# Patient Record
Sex: FEMALE | HISPANIC OR LATINO | Employment: UNEMPLOYED | URBAN - NONMETROPOLITAN AREA
[De-identification: names, ages, dates, MRNs, and addresses within clinical notes are randomized per-mention and may not be internally consistent; named-entity substitution may affect disease eponyms.]

---

## 2017-05-18 ENCOUNTER — OFFICE VISIT (OUTPATIENT)
Dept: OTOLARYNGOLOGY | Facility: CLINIC | Age: 32
End: 2017-05-18

## 2017-05-18 VITALS
HEIGHT: 63 IN | DIASTOLIC BLOOD PRESSURE: 84 MMHG | HEART RATE: 67 BPM | BODY MASS INDEX: 21.62 KG/M2 | WEIGHT: 122 LBS | TEMPERATURE: 98.8 F | SYSTOLIC BLOOD PRESSURE: 117 MMHG

## 2017-05-18 DIAGNOSIS — D48.9 NEOPLASM OF UNCERTAIN BEHAVIOR: Primary | ICD-10-CM

## 2017-05-18 PROCEDURE — 99203 OFFICE O/P NEW LOW 30 MIN: CPT | Performed by: OTOLARYNGOLOGY

## 2017-05-18 RX ORDER — AMOXICILLIN 500 MG/1
500 CAPSULE ORAL 3 TIMES DAILY
Qty: 20 CAPSULE | Refills: 0 | Status: SHIPPED | OUTPATIENT
Start: 2017-05-18 | End: 2017-05-28

## 2017-05-23 ENCOUNTER — HOSPITAL ENCOUNTER (OUTPATIENT)
Dept: CT IMAGING | Facility: HOSPITAL | Age: 32
Discharge: HOME | End: 2017-05-23
Attending: OTOLARYNGOLOGY | Admitting: OTOLARYNGOLOGY

## 2017-05-23 DIAGNOSIS — D48.9 NEOPLASM OF UNCERTAIN BEHAVIOR: ICD-10-CM

## 2017-05-23 PROCEDURE — 70491 CT SOFT TISSUE NECK W/DYE: CPT

## 2017-05-23 PROCEDURE — 0 IOPAMIDOL 61 % SOLUTION: Performed by: OTOLARYNGOLOGY

## 2017-05-23 PROCEDURE — 82565 ASSAY OF CREATININE: CPT

## 2017-05-23 RX ADMIN — IOPAMIDOL 100 ML: 612 INJECTION, SOLUTION INTRAVENOUS at 11:45

## 2017-05-24 LAB — CREAT BLDA-MCNC: 0.8 MG/DL (ref 0.6–1.3)

## 2017-06-08 ENCOUNTER — OFFICE VISIT (OUTPATIENT)
Dept: OTOLARYNGOLOGY | Facility: CLINIC | Age: 32
End: 2017-06-08

## 2017-06-08 VITALS
WEIGHT: 121 LBS | DIASTOLIC BLOOD PRESSURE: 68 MMHG | HEART RATE: 80 BPM | HEIGHT: 63 IN | SYSTOLIC BLOOD PRESSURE: 110 MMHG | TEMPERATURE: 99.1 F | BODY MASS INDEX: 21.44 KG/M2

## 2017-06-08 DIAGNOSIS — D37.01 NEOPLASM OF UNCERTAIN BEHAVIOR OF LIP: Primary | ICD-10-CM

## 2017-06-08 DIAGNOSIS — K13.79 LESION OF HARD PALATE: ICD-10-CM

## 2017-06-08 PROCEDURE — 99213 OFFICE O/P EST LOW 20 MIN: CPT | Performed by: PHYSICIAN ASSISTANT

## 2017-06-08 RX ORDER — AMOXICILLIN 500 MG/1
1000 CAPSULE ORAL 2 TIMES DAILY
COMMUNITY
End: 2017-07-27

## 2017-06-08 NOTE — PROGRESS NOTES
YOB: 1985  Location: [facility]  Location Address: [address] [place] [address],  [address], [address] [place] [address]  Location Phone: [phone]    Chief Complaint   Patient presents with   • Follow-up     lip       History of Present Illness  The patient is a 31 y.o. female who presents for follow up for an upper lip tumor. She had this removed by Dr. Chris Lazo at the [facility] in  at [facility].  Dr. Sheridan has retired. She had been having issues recently with her lip turning blue and black. She states her lip will also bleed, swell and become painful and tender. Patient states that her lip is better today. Patient does however have a black lesion of the upper palate.  .       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      DOSE: 178 mGycm. Automated exposure control was utilized to diminish  patient radiation dose.      FINDINGS: Multiple contiguous axial images were obtained through the  soft tissues of the neck at 3 mm intervals following intravenous  contrast administration with reformatted images obtained in the sagittal  and coronal projections from the original data set. Intraparenchymal  calcifications are noted within the left frontal lobe measuring 8 mm and  within the posterior left temporal lobe measuring 7 mm in size. There is  no associated mass effect. These are of uncertain etiology but are not  felt to be neoplastic in nature. Those portions of the brain which are  visualized are otherwise normal in appearance.      Both orbits are normal in appearance including the optic nerves and  extraocular muscles. Both globes are intact. Visualized paranasal  sinuses are clear with no mucoperiosteal thickening or air-fluid levels.      The nasopharynx and parapharyngeal spaces are symmetric with no evidence  of mass or mass effect. The parotid and  submandibular glands are normal  in appearance.      No enlarged cervical chain lymphadenopathy is present. There are no  pathologically enlarged submandibular or submental nodes present. A  discrete mass is not currently identified. There are no acute bony  abnormalities present. The lung apices are clear. The thyroid gland is  homogeneous in density with no discrete nodules or cystic change.      IMPRESSION:  Essentially unremarkable CT of the soft tissues of the neck. No discrete  mass is clearly identified and there is no evidence of cervical  lymphadenopathy.  This report was finalized on 05/23/2017 13:33 by Dr. Hay Gonzalez MD.       Past Medical History:   Diagnosis Date   • Neoplasm of uncertain behavior of lip        Past Surgical History:   Procedure Laterality Date   • OTHER SURGICAL HISTORY      Lip         Current Outpatient Prescriptions:   •  amoxicillin (AMOXIL) 500 MG capsule, Take 1,000 mg by mouth 2 (Two) Times a Day., Disp: , Rfl:   •  mupirocin (BACTROBAN) 2 % ointment, Apply  topically 3 (Three) Times a Day., Disp: , Rfl:     Review of patient's allergies indicates no known allergies.    Family History   Problem Relation Age of Onset   • No Known Problems Mother    • No Known Problems Father        Social History     Social History   • Marital status:      Spouse name: N/A   • Number of children: N/A   • Years of education: N/A     Occupational History   • Not on file.     Social History Main Topics   • Smoking status: Never Smoker   • Smokeless tobacco: Not on file   • Alcohol use No   • Drug use: Defer   • Sexual activity: Not on file     Other Topics Concern   • Not on file     Social History Narrative       Review of Systems   Constitutional: Negative.    HENT:        Lesion of upper palate   Eyes: Negative.    Respiratory: Negative.    Cardiovascular: Negative.    Gastrointestinal: Negative.    Endocrine: Negative.    Genitourinary: Negative.    Musculoskeletal: Negative.     Skin: Negative.    Allergic/Immunologic: Negative.    Neurological: Negative.    Hematological: Negative.    Psychiatric/Behavioral: Negative.        Vitals:    06/08/17 0934   BP: 110/68   Pulse: 80   Temp: 99.1 °F (37.3 °C)       Objective     Physical Exam  CONSTITUTIONAL: well nourished, alert, oriented, in no acute distress     COMMUNICATION AND VOICE: able to communicate normally, normal voice quality    HEAD: normocephalic, no lesions, atraumatic, no tenderness, no masses     FACE: appearance normal, no lesions, no tenderness, no deformities, facial motion symmetric    SALIVARY GLANDS: parotid glands with no tenderness, no swelling, no masses, submandibular glands with normal size, nontender    EYES: ocular motility normal, eyelids normal, orbits normal, no proptosis, conjunctiva normal , pupils equal, round     EARS:  Hearing: response to conversational voice normal bilaterally   External Ears: auricles without lesions  Otoscopic: tympanic membrane appearance normal, no lesions, no perforation, normal mobility, no fluid    NOSE:  External Nose: structure normal, no tenderness on palpation, no nasal discharge, no lesions, no evidence of trauma, nostrils patent   Intranasal Exam: nasal mucosa normal, vestibule within normal limits, inferior turbinate normal, nasal septum midline     ORAL:  Lips: upper and lower lips without lesion   Teeth: dentition within normal limits for age   Gums: gingivae healthy   Oral Mucosa: oral mucosa normal, no mucosal lesions   Floor of Mouth: Warthin’s duct patent, mucosa normal  Tongue: lingual mucosa normal without lesions, normal tongue mobility   Palate: Erythematous raised lesion on the anterior central hard palate 4mm x 8mm in size,  Oropharynx: oropharyngeal mucosa normal    NECK: neck appearance normal, no mass,  noted without erythema or tenderness    THYROID: no overt thyromegaly, no tenderness, nodules or mass present on palpation, position midline     LYMPH NODES:  no lymphadenopathy    CHEST/RESPIRATORY: respiratory effort normal, normal breath sounds     CARDIOVASCULAR: rate and rhythm normal, extremities without cyanosis or edema      NEUROLOGIC/PSYCHIATRIC: oriented to time, place and person, mood normal, affect appropriate, CN II-XII intact grossly    Assessment/Plan   Problems Addressed this Visit        Other    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    Return in about 4 months (around 10/8/2017) for Recheck lip/palate.       Patient Instructions   Be aware of the signs and symptoms of head and neck cancer including neck mass, persistent sore throat, ear pain, hemoptysis, weight loss and hoarseness. If any of these symptoms occur, call for evaluation.     Will monitor palate lesion, if worsens or changes will consider biopsy.

## 2017-06-08 NOTE — PATIENT INSTRUCTIONS
Be aware of the signs and symptoms of head and neck cancer including neck mass, persistent sore throat, ear pain, hemoptysis, weight loss and hoarseness. If any of these symptoms occur, call for evaluation.

## 2017-07-27 ENCOUNTER — OFFICE VISIT (OUTPATIENT)
Dept: OTOLARYNGOLOGY | Facility: CLINIC | Age: 32
End: 2017-07-27

## 2017-07-27 ENCOUNTER — TELEPHONE (OUTPATIENT)
Dept: OTOLARYNGOLOGY | Facility: CLINIC | Age: 32
End: 2017-07-27

## 2017-07-27 VITALS
TEMPERATURE: 98.3 F | HEIGHT: 63 IN | WEIGHT: 121 LBS | BODY MASS INDEX: 21.44 KG/M2 | SYSTOLIC BLOOD PRESSURE: 110 MMHG | DIASTOLIC BLOOD PRESSURE: 80 MMHG

## 2017-07-27 DIAGNOSIS — I78.1 CAPILLARY HEMANGIOMA: ICD-10-CM

## 2017-07-27 DIAGNOSIS — D37.01 NEOPLASM OF UNCERTAIN BEHAVIOR OF LIP: Primary | ICD-10-CM

## 2017-07-27 PROCEDURE — 99213 OFFICE O/P EST LOW 20 MIN: CPT | Performed by: OTOLARYNGOLOGY

## 2017-07-27 RX ORDER — EMOLLIENT BASE
CREAM (GRAM) TOPICAL AS NEEDED
Qty: 15 TUBE | Refills: 0 | Status: SHIPPED | OUTPATIENT
Start: 2017-07-27 | End: 2017-08-23

## 2017-07-27 NOTE — PROGRESS NOTES
YOB: 1985  Location: [facility]  Location Address: [address] [place] [address],  [address], [address] [place] [address]  Location Phone: [phone]    Chief Complaint   Patient presents with   • Follow-up     problems with lip       History of Present Illness  The patient is a 31 y.o. female who presents for follow up for an upper lip tumor. She had this removed by Dr. Chris Lazo at the [facility] in  at [facility].  Dr. Sheridan has retired. She had been having issues recently with her lip turning blue and black, bleeding and becoming red. The bruising will extend to nasal area.  She states it is painful to eat.     She had approximately 3 operations in [place] prior to age 4 associated with what she believes was a capillary hemangioma.this makes sense related to the clinical picture course that I have seen.    Pathology from Dr. Washington operation in  resulted in findings consistent with the scar and traumatic neuroma possibly..  .          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      DOSE: 178 mGycm. Automated exposure control was utilized to diminish  patient radiation dose.      FINDINGS: Multiple contiguous axial images were obtained through the  soft tissues of the neck at 3 mm intervals following intravenous  contrast administration with reformatted images obtained in the sagittal  and coronal projections from the original data set. Intraparenchymal  calcifications are noted within the left frontal lobe measuring 8 mm and  within the posterior left temporal lobe measuring 7 mm in size. There is  no associated mass effect. These are of uncertain etiology but are not  felt to be neoplastic in nature. Those portions of the brain which are  visualized are otherwise normal in appearance.      Both orbits are normal in appearance including the optic nerves  and  extraocular muscles. Both globes are intact. Visualized paranasal  sinuses are clear with no mucoperiosteal thickening or air-fluid levels.      The nasopharynx and parapharyngeal spaces are symmetric with no evidence  of mass or mass effect. The parotid and submandibular glands are normal  in appearance.      No enlarged cervical chain lymphadenopathy is present. There are no  pathologically enlarged submandibular or submental nodes present. A  discrete mass is not currently identified. There are no acute bony  abnormalities present. The lung apices are clear. The thyroid gland is  homogeneous in density with no discrete nodules or cystic change.      IMPRESSION:  Essentially unremarkable CT of the soft tissues of the neck. No discrete  mass is clearly identified and there is no evidence of cervical  lymphadenopathy.  This report was finalized on 05/23/2017 13:33 by Dr. Hay Gonzalez MD.       7/27/17                    Past Medical History:   Diagnosis Date   • Neoplasm of uncertain behavior of lip        Past Surgical History:   Procedure Laterality Date   • OTHER SURGICAL HISTORY      Lip       No current outpatient prescriptions on file.    Review of patient's allergies indicates no known allergies.    Family History   Problem Relation Age of Onset   • No Known Problems Mother    • No Known Problems Father        Social History     Social History   • Marital status:      Spouse name: N/A   • Number of children: N/A   • Years of education: N/A     Occupational History   • Not on file.     Social History Main Topics   • Smoking status: Never Smoker   • Smokeless tobacco: Not on file   • Alcohol use No   • Drug use: Defer   • Sexual activity: Not on file     Other Topics Concern   • Not on file     Social History Narrative       Review of Systems   Constitutional: Negative.    HENT:        Redness and swelling of lip   Eyes: Negative.    Respiratory: Negative.    Cardiovascular: Negative.   "  Gastrointestinal: Negative.    Endocrine: Negative.    Genitourinary: Negative.    Musculoskeletal: Negative.    Skin: Negative.    Allergic/Immunologic: Negative.    Neurological: Negative.    Hematological: Negative.    Psychiatric/Behavioral: Negative.        Vitals:    07/27/17 1514   BP: 110/80   Temp: 98.3 °F (36.8 °C)       Objective     Physical Exam  CONSTITUTIONAL: well nourished, alert, oriented, in no acute distress     COMMUNICATION AND VOICE: able to communicate normally, normal voice quality    HEAD: normocephalic, no lesions, atraumatic, no tenderness, no masses     FACE: appearance normal, no lesions, no tenderness, no deformities, facial motion symmetric    SALIVARY GLANDS: parotid glands with no tenderness, no swelling, no masses, submandibular glands with normal size, nontender    EYES: ocular motility normal, eyelids normal, orbits normal, no proptosis, conjunctiva normal , pupils equal, round     EARS:  Hearing: response to conversational voice normal bilaterally   External Ears: auricles without lesions  Otoscopic: tympanic membrane appearance normal, no lesions, no perforation, normal mobility, no fluid    NOSE:  External Nose: structure normal, no tenderness on palpation, no nasal discharge, no lesions, no evidence of trauma, nostrils patent   Intranasal Exam: nasal mucosa normal, vestibule within normal limits, inferior turbinate normal, nasal septum midline   Nasopharynx:     ORAL:  Lips:Upper lip with mild excoriation and tenderness at the lingual frenulum in the gingivobuccal sulcus but no appreciable mass on palpation or visualization though blue-\"carter\" discoloration is noted, lower lip without lesion, well-healed scar from previous excision as noted the right lip vermilion- is all well-healed.  OC/OP: No masses lesions noted by visualization or palpation except for anteriorly in the primary palate there is a small 2-3 mm nodule which is moderately tender.    HYPOPHARYNX: "   LARYNX:    NECK: neck appearance normal, no mass,  noted without erythema or tenderness    THYROID: no overt thyromegaly, no tenderness, nodules or mass present on palpation, position midline     LYMPH NODES: no lymphadenopathy    CHEST/RESPIRATORY: respiratory effort normal, normal breath sounds     CARDIOVASCULAR: rate and rhythm normal, extremities without cyanosis or edema      NEUROLOGIC/PSYCHIATRIC: oriented to time, place and person, mood normal, affect appropriate, CN II-XII intact grossly    Assessment/Plan   Gracie was seen today for follow-up.    Diagnoses and all orders for this visit:    Neoplasm of uncertain behavior of lip    Capillary hemangioma  Comments:  of lip S/P multiple surgeries      * Surgery not found *  No orders of the defined types were placed in this encounter.    Return in about 3 months (around 10/27/2017).       Patient Instructions   Call for problems  Keep lip moist    We will discuss and possibly refer to Dr. Ham    I do not believe surgical intervention at this time as recommended for her issues though she is having significant problems with recurrent swelling and chapping of the lip.

## 2017-07-27 NOTE — PATIENT INSTRUCTIONS
Call for problems  Keep lip moist    We will discuss and possibly refer to Dr. Ham    I do not believe surgical intervention at this time as recommended for her issues though she is having significant problems with recurrent swelling and chapping of the lip.

## 2017-08-03 ENCOUNTER — TELEPHONE (OUTPATIENT)
Dept: OTOLARYNGOLOGY | Facility: CLINIC | Age: 32
End: 2017-08-03

## 2017-08-03 NOTE — TELEPHONE ENCOUNTER
Left message for patient regarding appointment with Dr. Ham- scheduled for 08/23/2017 at 1:45 to evaluate hemangioma of upper lip

## 2017-08-23 ENCOUNTER — OFFICE VISIT (OUTPATIENT)
Dept: OTOLARYNGOLOGY | Facility: CLINIC | Age: 32
End: 2017-08-23

## 2017-08-23 VITALS
WEIGHT: 122 LBS | TEMPERATURE: 99.2 F | HEIGHT: 62 IN | SYSTOLIC BLOOD PRESSURE: 128 MMHG | BODY MASS INDEX: 22.45 KG/M2 | DIASTOLIC BLOOD PRESSURE: 84 MMHG

## 2017-08-23 DIAGNOSIS — D37.01 NEOPLASM OF UNCERTAIN BEHAVIOR OF LIP: Primary | ICD-10-CM

## 2017-08-23 PROCEDURE — 99213 OFFICE O/P EST LOW 20 MIN: CPT | Performed by: OTOLARYNGOLOGY

## 2017-08-23 NOTE — PROGRESS NOTES
"CC:  Painful lip lesion    History of Present Illness  Gracie Tapia complains of a skin lesion of the upper lip. The lesion has been present for since birth. The lesion has changed. Symptoms associated with the lesion are: bleeding, swelling, bluish discoloration, painful making it difficult to eat. This has been worsening for the last 5 months. Patient denies itching, drainage, infection.    She states she has no prior history of skin cancer.    She states that a biopsy has not been performed.     She states this area has been excised 3 times in [place] prior to the age of 13. She was told this was a hemangioma. Dr. Lazo in [place] excised this area again in 2003 removing scarring. She states that Dr. Lazo accidentally incised the white lip in vertical fashion.    Past Medical History:   Diagnosis Date   • Hemangioma of lip    • Neoplasm of uncertain behavior of lip      Past Surgical History:   Procedure Laterality Date   • OTHER SURGICAL HISTORY      Lip     Family History   Problem Relation Age of Onset   • No Known Problems Mother    • No Known Problems Father      Social History   Substance Use Topics   • Smoking status: Never Smoker   • Smokeless tobacco: Never Used   • Alcohol use No     Allergies:  Review of patient's allergies indicates no known allergies.  No current outpatient prescriptions on file.    Review of Systems   Constitutional: Negative for activity change, appetite change, chills, fatigue, fever and unexpected weight change.   HENT: Negative for congestion, dental problem, facial swelling and nosebleeds.    Eyes: Negative for discharge and redness.   Skin: Negative for color change, pallor and rash.   Hematological: Negative for adenopathy. Does not bruise/bleed easily.       /84  Temp 99.2 °F (37.3 °C)  Ht 62\" (157.5 cm)  Wt 122 lb (55.3 kg)  BMI 22.31 kg/m2    Physical Exam   Constitutional: She is oriented to person, place, and time. She appears well-developed and " well-nourished. She is cooperative. No distress.   HENT:   Head: Normocephalic and atraumatic.   Right Ear: External ear normal.   Left Ear: External ear normal.   Nose: Nose normal. No nasal deformity.   Mouth/Throat: Uvula is midline, oropharynx is clear and moist and mucous membranes are normal.   Upper lip with mild excoriation, tenderness to palpation, significant fibrosis, and bluish discoloration   Eyes: Conjunctivae and EOM are normal. Pupils are equal, round, and reactive to light. Right eye exhibits no discharge. Left eye exhibits no discharge. No scleral icterus.   Neck: Normal range of motion and phonation normal. Neck supple. No JVD present. No tracheal deviation present. No thyromegaly present.   Pulmonary/Chest: Effort normal. No stridor. No respiratory distress.   Musculoskeletal: Normal range of motion. She exhibits no edema or deformity.   Lymphadenopathy:     She has no cervical adenopathy.   Neurological: She is alert and oriented to person, place, and time. She has normal strength. No cranial nerve deficit. Coordination normal.   Skin: Skin is warm and dry. No rash noted. She is not diaphoretic. No erythema. No pallor.   Psychiatric: She has a normal mood and affect. Her speech is normal and behavior is normal. Judgment and thought content normal. Cognition and memory are normal.   Nursing note and vitals reviewed.            Gracie was seen today for follow-up.    Diagnoses and all orders for this visit:    Neoplasm of uncertain behavior of lip        Photographs were taken today. Postoperative care instructions were given.     Discussion of skin lesion. Discussed risks, benefits, alternatives, and possible complications of excision of the skin lesion with reconstruction utilizing local tissue rearrangement, full-thickness skin grafting, or local interpolated flaps. Risks include, but are not limited to: bleeding, infection, hematoma, recurrence, need for additional procedures, flap failure,  cosmetic deformity. Patient understands risks and would like to proceed with surgery.     I have recommended excision of the scarring vs hemangioma vs neuroma with permanent section analysis and reconstruction with linear closure in the operating room.      Return for 1 week postoperatively.    Chris Ham MD  08/23/17  2:31 PM

## 2017-09-06 ENCOUNTER — APPOINTMENT (OUTPATIENT)
Dept: PREADMISSION TESTING | Facility: HOSPITAL | Age: 32
End: 2017-09-06

## 2017-09-06 VITALS
HEART RATE: 66 BPM | SYSTOLIC BLOOD PRESSURE: 115 MMHG | TEMPERATURE: 98.4 F | RESPIRATION RATE: 16 BRPM | OXYGEN SATURATION: 100 % | DIASTOLIC BLOOD PRESSURE: 51 MMHG | WEIGHT: 123.6 LBS | HEIGHT: 62 IN | BODY MASS INDEX: 22.74 KG/M2

## 2017-09-06 LAB
DEPRECATED RDW RBC AUTO: 41.7 FL (ref 40–54)
ERYTHROCYTE [DISTWIDTH] IN BLOOD BY AUTOMATED COUNT: 12.7 % (ref 12–15)
HCT VFR BLD AUTO: 41.7 % (ref 37–47)
HGB BLD-MCNC: 13.8 G/DL (ref 12–16)
MCH RBC QN AUTO: 30 PG (ref 28–32)
MCHC RBC AUTO-ENTMCNC: 33.1 G/DL (ref 33–36)
MCV RBC AUTO: 90.7 FL (ref 82–98)
PLATELET # BLD AUTO: 252 10*3/MM3 (ref 130–400)
PMV BLD AUTO: 11.7 FL (ref 6–12)
RBC # BLD AUTO: 4.6 10*6/MM3 (ref 4.2–5.4)
WBC NRBC COR # BLD: 5.81 10*3/MM3 (ref 4.8–10.8)

## 2017-09-06 PROCEDURE — 85027 COMPLETE CBC AUTOMATED: CPT | Performed by: OTOLARYNGOLOGY

## 2017-09-06 PROCEDURE — 36415 COLL VENOUS BLD VENIPUNCTURE: CPT

## 2017-09-06 RX ORDER — FEXOFENADINE HCL 180 MG/1
180 TABLET ORAL DAILY
COMMUNITY
End: 2018-04-30 | Stop reason: ALTCHOICE

## 2017-09-06 NOTE — DISCHARGE INSTRUCTIONS
DAY OF SURGERY INSTRUCTIONS        YOUR SURGEON: ***BILL UNDERWOOD     PROCEDURE: ***EXCISION OF LESION    DATE OF SURGERY: ***SEPTEMBER 12, 2017    ARRIVAL TIME: AS DIRECTED BY OFFICE    DAY OF SURGERY TAKE ONLY THESE MEDICATIONS: ***NONE            BEFORE YOU COME TO THE HOSPITAL  (Pre-op instructions)  • Do not eat, drink, smoke or chew gum after midnight the night before surgery.  This also includes no mints.  • Morning of surgery take only the medicines you have been instructed with a sip of water unless otherwise instructed  by your physician.  • Do not shave, wear makeup or dark nail polish.  • Remove all jewelry including rings.  • Leave anything you consider valuable at home.  • Leave your suitcase in the car until after your surgery.  • Bring the following with you if applicable:  o Picture ID and insurance, Medicare or Medicaid cards  o Co-pay/deductible required by insurance (cash, check, credit card)  o Copy of advance directive, living will or power-of- documents if not brought to PAT  o CPAP or BIPAP mask and tubing  o Relaxation aids (MP3 player, book, magazine)  • On the day of surgery check in at registration located at the main entrance of the hospital.       Outpatient Surgery Guidelines, Adult  Outpatient procedures are those for which the person having the procedure is allowed to go home the same day as the procedure. Various procedures are done on an outpatient basis. You should follow some general guidelines if you will be having an outpatient procedure.  LET YOUR HEALTH CARE PROVIDER KNOW ABOUT:  · Any allergies you have.  · All medicines you are taking, including vitamins, herbs, eye drops, creams, and over-the-counter medicines.  · Previous problems you or members of your family have had with the use of anesthetics.  · Any blood disorders you have.  · Previous surgeries you have had.  · Medical conditions you have.  RISKS AND COMPLICATIONS  Your health care provider will discuss  possible risks and complications with you before surgery. Common risks and complications include:    · Problems due to the use of anesthetics.  · Blood loss and replacement (does not apply to minor surgical procedures).  · Temporary increase in pain due to surgery.  · Uncorrected pain or problems that the surgery was meant to correct.  · Infection.  · New damage.  BEFORE THE PROCEDURE  · Ask your health care provider about changing or stopping your regular medicines. You may need to stop taking certain medicines in the days or weeks before the procedure.  · Stop smoking at least 2 weeks before surgery. This lowers your risk for complications during and after surgery. Ask your health care provider for help with this if needed.  · Eat your usual meals and a light supper the day before surgery. Continue fluid intake. Do not drink alcohol.  · Do not eat or drink after midnight the night before your surgery.   · Arrange for someone to take you home and to stay with you for 24 hours after the procedure. Medicine given for your procedure may affect your ability to drive or to care for yourself.  · Call your health care provider's office if you develop an illness or problem that may prevent you from safely having your procedure.  AFTER THE PROCEDURE  After surgery, you will be taken to a recovery area, where your progress will be monitored. If there are no complications, you will be allowed to go home when you are awake, stable, and taking fluids well. You may have numbness around the surgical site. Healing will take some time. You will have tenderness at the surgical site and may have some swelling and bruising. You may also have some nausea.  HOME CARE INSTRUCTIONS  · Do not drive for 24 hours, or as directed by your health care provider. Do not drive while taking prescription pain medicines.  · Do not drink alcohol for 24 hours.  · Do not make important decisions or sign legal documents for 24 hours.  · You may resume a  normal diet and activities as directed.  · Do not lift anything heavier than 10 pounds (4.5 kg) or play contact sports until your health care provider says it is okay.  · Change your bandages (dressings) as directed.  · Only take over-the-counter or prescription medicines as directed by your health care provider.  · Follow up with your health care provider as directed.  SEEK MEDICAL CARE IF:  · You have increased bleeding (more than a small spot) from the surgical site.  · You have redness, swelling, or increasing pain in the wound.  · You see pus coming from the wound.  · You have a fever.  · You notice a bad smell coming from the wound or dressing.  · You feel lightheaded or faint.  · You develop a rash.  · You have trouble breathing.  · You develop allergies.  MAKE SURE YOU:  · Understand these instructions.  · Will watch your condition.  · Will get help right away if you are not doing well or get worse.     This information is not intended to replace advice given to you by your health care provider. Make sure you discuss any questions you have with your health care provider.     Document Released: 09/12/2002 Document Revised: 05/03/2016 Document Reviewed: 05/22/2014  [employer] Interactive Patient Education ©2016 [employer]       Fall Prevention in Hospitals, Adult  As a hospital patient, your condition and the treatments you receive can increase your risk for falls. Some additional risk factors for falls in a hospital include:  · Being in an unfamiliar environment.  · Being on bed rest.  · Your surgery.  · Taking certain medicines.  · Your tubing requirements, such as intravenous (IV) therapy or catheters.  It is important that you learn how to decrease fall risks while at the hospital. Below are important tips that can help prevent falls.  SAFETY TIPS FOR PREVENTING FALLS  Talk about your risk of falling.  · Ask your health care provider why you are at risk for falling. Is it your medicine, illness, tubing  placement, or something else?  · Make a plan with your health care provider to keep you safe from falls.  · Ask your health care provider or pharmacist about side effects of your medicines. Some medicines can make you dizzy or affect your coordination.  Ask for help.  · Ask for help before getting out of bed. You may need to press your call button.  · Ask for assistance in getting safely to the toilet.  · Ask for a walker or cane to be put at your bedside. Ask that most of the side rails on your bed be placed up before your health care provider leaves the room.  · Ask family or friends to sit with you.  · Ask for things that are out of your reach, such as your glasses, hearing aids, telephone, bedside table, or call button.  Follow these tips to avoid falling:  · Stay lying or seated, rather than standing, while waiting for help.  · Wear rubber-soled slippers or shoes whenever you walk in the hospital.  · Avoid quick, sudden movements.  ¨ Change positions slowly.  ¨ Sit on the side of your bed before standing.  ¨ Stand up slowly and wait before you start to walk.  · Let your health care provider know if there is a spill on the floor.  · Pay careful attention to the medical equipment, electrical cords, and tubes around you.  · When you need help, use your call button by your bed or in the bathroom. Wait for one of your health care providers to help you.  · If you feel dizzy or unsure of your footing, return to bed and wait for assistance.  · Avoid being distracted by the TV, telephone, or another person in your room.  · Do not lean or support yourself on rolling objects, such as IV poles or bedside tables.     This information is not intended to replace advice given to you by your health care provider. Make sure you discuss any questions you have with your health care provider.     Document Released: 12/15/2001 Document Revised: 01/08/2016 Document Reviewed: 08/25/2013  Elsevier Interactive Patient Education ©2016  [employer]       Surgical Site Infections FAQs  What is a Surgical Site Infection (SSI)?  A surgical site infection is an infection that occurs after surgery in the part of the body where the surgery took place. Most patients who have surgery do not develop an infection. However, infections develop in about 1 to 3 out of every 100 patients who have surgery.  Some of the common symptoms of a surgical site infection are:  · Redness and pain around the area where you had surgery  · Drainage of cloudy fluid from your surgical wound  · Fever  Can SSIs be treated?  Yes. Most surgical site infections can be treated with antibiotics. The antibiotic given to you depends on the bacteria (germs) causing the infection. Sometimes patients with SSIs also need another surgery to treat the infection.  What are some of the things that hospitals are doing to prevent SSIs?  To prevent SSIs, doctors, nurses, and other healthcare providers:  · Clean their hands and arms up to their elbows with an antiseptic agent just before the surgery.  · Clean their hands with soap and water or an alcohol-based hand rub before and after caring for each patient.  · May remove some of your hair immediately before your surgery using electric clippers if the hair is in the same area where the procedure will occur. They should not shave you with a razor.  · Wear special hair covers, masks, gowns, and gloves during surgery to keep the surgery area clean.  · Give you antibiotics before your surgery starts. In most cases, you should get antibiotics within 60 minutes before the surgery starts and the antibiotics should be stopped within 24 hours after surgery.  · Clean the skin at the site of your surgery with a special soap that kills germs.  What can I do to help prevent SSIs?  Before your surgery:  · Tell your doctor about other medical problems you may have. Health problems such as allergies, diabetes, and obesity could affect your surgery and your  treatment.  · Quit smoking. Patients who smoke get more infections. Talk to your doctor about how you can quit before your surgery.  · Do not shave near where you will have surgery. Shaving with a razor can irritate your skin and make it easier to develop an infection.  At the time of your surgery:  · Speak up if someone tries to shave you with a razor before surgery. Ask why you need to be shaved and talk with your surgeon if you have any concerns.  · Ask if you will get antibiotics before surgery.  After your surgery:  · Make sure that your healthcare providers clean their hands before examining you, either with soap and water or an alcohol-based hand rub.  · If you do not see your providers clean their hands, please ask them to do so.  · Family and friends who visit you should not touch the surgical wound or dressings.  · Family and friends should clean their hands with soap and water or an alcohol-based hand rub before and after visiting you. If you do not see them clean their hands, ask them to clean their hands.  What do I need to do when I go home from the hospital?  · Before you go home, your doctor or nurse should explain everything you need to know about taking care of your wound. Make sure you understand how to care for your wound before you leave the hospital.  · Always clean your hands before and after caring for your wound.  · Before you go home, make sure you know who to contact if you have questions or problems after you get home.  · If you have any symptoms of an infection, such as redness and pain at the surgery site, drainage, or fever, call your doctor immediately.  If you have additional questions, please ask your doctor or nurse.  Developed and co-sponsored by The Society for Healthcare Epidemiology of [place] (SHEA); Infectious Diseases Society of [place] (IDSA); American Hospital Association; Association for Professionals in Infection Control and Epidemiology (APIC); Centers for Disease  Control and Prevention (CDC); and The Joint Commission.     This information is not intended to replace advice given to you by your health care provider. Make sure you discuss any questions you have with your health care provider.     Document Released: 12/23/2014 Document Revised: 01/08/2016 Document Reviewed: 03/02/2016  Elsevier Interactive Patient Education ©2016 [employer] Inc.

## 2017-09-12 ENCOUNTER — HOSPITAL ENCOUNTER (OUTPATIENT)
Facility: HOSPITAL | Age: 32
Setting detail: HOSPITAL OUTPATIENT SURGERY
Discharge: HOME | End: 2017-09-12
Attending: OTOLARYNGOLOGY | Admitting: OTOLARYNGOLOGY

## 2017-09-12 ENCOUNTER — ANESTHESIA EVENT (OUTPATIENT)
Dept: PERIOP | Facility: HOSPITAL | Age: 32
End: 2017-09-12

## 2017-09-12 ENCOUNTER — ANESTHESIA (OUTPATIENT)
Dept: PERIOP | Facility: HOSPITAL | Age: 32
End: 2017-09-12

## 2017-09-12 VITALS
DIASTOLIC BLOOD PRESSURE: 61 MMHG | OXYGEN SATURATION: 99 % | TEMPERATURE: 97 F | RESPIRATION RATE: 16 BRPM | SYSTOLIC BLOOD PRESSURE: 103 MMHG | HEART RATE: 82 BPM

## 2017-09-12 DIAGNOSIS — D37.01 NEOPLASM OF UNCERTAIN BEHAVIOR OF LIP: ICD-10-CM

## 2017-09-12 LAB — B-HCG UR QL: NEGATIVE

## 2017-09-12 PROCEDURE — 25010000003 CEFAZOLIN PER 500 MG: Performed by: OTOLARYNGOLOGY

## 2017-09-12 PROCEDURE — 25010000002 DEXAMETHASONE PER 1 MG: Performed by: ANESTHESIOLOGY

## 2017-09-12 PROCEDURE — 12051 INTMD RPR FACE/MM 2.5 CM/<: CPT | Performed by: OTOLARYNGOLOGY

## 2017-09-12 PROCEDURE — 81025 URINE PREGNANCY TEST: CPT | Performed by: OTOLARYNGOLOGY

## 2017-09-12 PROCEDURE — 25010000002 PROPOFOL 10 MG/ML EMULSION: Performed by: NURSE ANESTHETIST, CERTIFIED REGISTERED

## 2017-09-12 PROCEDURE — 25010000002 NEOSTIGMINE PER 0.5 MG: Performed by: NURSE ANESTHETIST, CERTIFIED REGISTERED

## 2017-09-12 PROCEDURE — 25010000002 FENTANYL CITRATE (PF) 250 MCG/5ML SOLUTION: Performed by: NURSE ANESTHETIST, CERTIFIED REGISTERED

## 2017-09-12 PROCEDURE — 25010000002 DEXAMETHASONE PER 1 MG: Performed by: NURSE ANESTHETIST, CERTIFIED REGISTERED

## 2017-09-12 PROCEDURE — 88304 TISSUE EXAM BY PATHOLOGIST: CPT | Performed by: OTOLARYNGOLOGY

## 2017-09-12 PROCEDURE — 25010000002 ONDANSETRON PER 1 MG: Performed by: NURSE ANESTHETIST, CERTIFIED REGISTERED

## 2017-09-12 PROCEDURE — 11644 EXC F/E/E/N/L MAL+MRG 3.1-4: CPT | Performed by: OTOLARYNGOLOGY

## 2017-09-12 RX ORDER — SODIUM CHLORIDE 0.9 % (FLUSH) 0.9 %
3 SYRINGE (ML) INJECTION AS NEEDED
Status: DISCONTINUED | OUTPATIENT
Start: 2017-09-12 | End: 2017-09-12 | Stop reason: HOSPADM

## 2017-09-12 RX ORDER — ONDANSETRON 2 MG/ML
4 INJECTION INTRAMUSCULAR; INTRAVENOUS ONCE AS NEEDED
Status: DISCONTINUED | OUTPATIENT
Start: 2017-09-12 | End: 2017-09-12 | Stop reason: HOSPADM

## 2017-09-12 RX ORDER — FENTANYL CITRATE 50 UG/ML
25 INJECTION, SOLUTION INTRAMUSCULAR; INTRAVENOUS
Status: DISCONTINUED | OUTPATIENT
Start: 2017-09-12 | End: 2017-09-12 | Stop reason: HOSPADM

## 2017-09-12 RX ORDER — SCOLOPAMINE TRANSDERMAL SYSTEM 1 MG/1
1 PATCH, EXTENDED RELEASE TRANSDERMAL ONCE
Status: DISCONTINUED | OUTPATIENT
Start: 2017-09-12 | End: 2017-09-12 | Stop reason: HOSPADM

## 2017-09-12 RX ORDER — CLINDAMYCIN HYDROCHLORIDE 300 MG/1
300 CAPSULE ORAL 3 TIMES DAILY
Qty: 15 CAPSULE | Refills: 0 | Status: SHIPPED | OUTPATIENT
Start: 2017-09-12 | End: 2017-09-18

## 2017-09-12 RX ORDER — HYDROCODONE BITARTRATE AND ACETAMINOPHEN 7.5; 325 MG/1; MG/1
1 TABLET ORAL EVERY 4 HOURS PRN
Qty: 20 TABLET | Refills: 0 | Status: SHIPPED | OUTPATIENT
Start: 2017-09-12 | End: 2017-10-30

## 2017-09-12 RX ORDER — MAGNESIUM HYDROXIDE 1200 MG/15ML
LIQUID ORAL AS NEEDED
Status: DISCONTINUED | OUTPATIENT
Start: 2017-09-12 | End: 2017-09-12 | Stop reason: HOSPADM

## 2017-09-12 RX ORDER — DEXAMETHASONE SODIUM PHOSPHATE 4 MG/ML
INJECTION, SOLUTION INTRA-ARTICULAR; INTRALESIONAL; INTRAMUSCULAR; INTRAVENOUS; SOFT TISSUE AS NEEDED
Status: DISCONTINUED | OUTPATIENT
Start: 2017-09-12 | End: 2017-09-12 | Stop reason: SURG

## 2017-09-12 RX ORDER — LIDOCAINE HYDROCHLORIDE 40 MG/ML
SOLUTION TOPICAL AS NEEDED
Status: DISCONTINUED | OUTPATIENT
Start: 2017-09-12 | End: 2017-09-12 | Stop reason: SURG

## 2017-09-12 RX ORDER — ROCURONIUM BROMIDE 10 MG/ML
INJECTION, SOLUTION INTRAVENOUS AS NEEDED
Status: DISCONTINUED | OUTPATIENT
Start: 2017-09-12 | End: 2017-09-12 | Stop reason: SURG

## 2017-09-12 RX ORDER — SODIUM CHLORIDE, SODIUM LACTATE, POTASSIUM CHLORIDE, CALCIUM CHLORIDE 600; 310; 30; 20 MG/100ML; MG/100ML; MG/100ML; MG/100ML
9 INJECTION, SOLUTION INTRAVENOUS CONTINUOUS
Status: DISCONTINUED | OUTPATIENT
Start: 2017-09-12 | End: 2017-09-12 | Stop reason: HOSPADM

## 2017-09-12 RX ORDER — DEXAMETHASONE SODIUM PHOSPHATE 4 MG/ML
4 INJECTION, SOLUTION INTRA-ARTICULAR; INTRALESIONAL; INTRAMUSCULAR; INTRAVENOUS; SOFT TISSUE ONCE AS NEEDED
Status: COMPLETED | OUTPATIENT
Start: 2017-09-12 | End: 2017-09-12

## 2017-09-12 RX ORDER — FENTANYL CITRATE 50 UG/ML
INJECTION, SOLUTION INTRAMUSCULAR; INTRAVENOUS AS NEEDED
Status: DISCONTINUED | OUTPATIENT
Start: 2017-09-12 | End: 2017-09-12 | Stop reason: SURG

## 2017-09-12 RX ORDER — ONDANSETRON 2 MG/ML
INJECTION INTRAMUSCULAR; INTRAVENOUS AS NEEDED
Status: DISCONTINUED | OUTPATIENT
Start: 2017-09-12 | End: 2017-09-12 | Stop reason: SURG

## 2017-09-12 RX ORDER — MORPHINE SULFATE 2 MG/ML
2 INJECTION, SOLUTION INTRAMUSCULAR; INTRAVENOUS
Status: DISCONTINUED | OUTPATIENT
Start: 2017-09-12 | End: 2017-09-12 | Stop reason: HOSPADM

## 2017-09-12 RX ORDER — GLYCOPYRROLATE 0.2 MG/ML
INJECTION INTRAMUSCULAR; INTRAVENOUS AS NEEDED
Status: DISCONTINUED | OUTPATIENT
Start: 2017-09-12 | End: 2017-09-12 | Stop reason: SURG

## 2017-09-12 RX ORDER — GINSENG 100 MG
CAPSULE ORAL AS NEEDED
Status: DISCONTINUED | OUTPATIENT
Start: 2017-09-12 | End: 2017-09-12 | Stop reason: HOSPADM

## 2017-09-12 RX ORDER — MIDAZOLAM HYDROCHLORIDE 1 MG/ML
1 INJECTION INTRAMUSCULAR; INTRAVENOUS
Status: DISCONTINUED | OUTPATIENT
Start: 2017-09-12 | End: 2017-09-12 | Stop reason: HOSPADM

## 2017-09-12 RX ORDER — LIDOCAINE HYDROCHLORIDE AND EPINEPHRINE 10; 10 MG/ML; UG/ML
INJECTION, SOLUTION INFILTRATION; PERINEURAL AS NEEDED
Status: DISCONTINUED | OUTPATIENT
Start: 2017-09-12 | End: 2017-09-12 | Stop reason: HOSPADM

## 2017-09-12 RX ORDER — POLYVINYL ALCOHOL 14 MG/ML
1 SOLUTION/ DROPS OPHTHALMIC
Status: DISCONTINUED | OUTPATIENT
Start: 2017-09-12 | End: 2017-09-12 | Stop reason: CLARIF

## 2017-09-12 RX ORDER — SODIUM CHLORIDE, SODIUM LACTATE, POTASSIUM CHLORIDE, CALCIUM CHLORIDE 600; 310; 30; 20 MG/100ML; MG/100ML; MG/100ML; MG/100ML
1000 INJECTION, SOLUTION INTRAVENOUS CONTINUOUS
Status: DISCONTINUED | OUTPATIENT
Start: 2017-09-12 | End: 2017-09-12 | Stop reason: HOSPADM

## 2017-09-12 RX ORDER — MIDAZOLAM HYDROCHLORIDE 1 MG/ML
2 INJECTION INTRAMUSCULAR; INTRAVENOUS
Status: DISCONTINUED | OUTPATIENT
Start: 2017-09-12 | End: 2017-09-12 | Stop reason: HOSPADM

## 2017-09-12 RX ORDER — PROPOFOL 10 MG/ML
VIAL (ML) INTRAVENOUS AS NEEDED
Status: DISCONTINUED | OUTPATIENT
Start: 2017-09-12 | End: 2017-09-12 | Stop reason: SURG

## 2017-09-12 RX ORDER — NALOXONE HCL 0.4 MG/ML
0.4 VIAL (ML) INJECTION AS NEEDED
Status: DISCONTINUED | OUTPATIENT
Start: 2017-09-12 | End: 2017-09-12 | Stop reason: HOSPADM

## 2017-09-12 RX ORDER — DIPHENHYDRAMINE HYDROCHLORIDE 50 MG/ML
12.5 INJECTION INTRAMUSCULAR; INTRAVENOUS
Status: DISCONTINUED | OUTPATIENT
Start: 2017-09-12 | End: 2017-09-12 | Stop reason: HOSPADM

## 2017-09-12 RX ORDER — DEXTROSE MONOHYDRATE 25 G/50ML
12.5 INJECTION, SOLUTION INTRAVENOUS AS NEEDED
Status: DISCONTINUED | OUTPATIENT
Start: 2017-09-12 | End: 2017-09-12 | Stop reason: HOSPADM

## 2017-09-12 RX ORDER — HYDRALAZINE HYDROCHLORIDE 20 MG/ML
5 INJECTION INTRAMUSCULAR; INTRAVENOUS
Status: DISCONTINUED | OUTPATIENT
Start: 2017-09-12 | End: 2017-09-12 | Stop reason: HOSPADM

## 2017-09-12 RX ORDER — LABETALOL HYDROCHLORIDE 5 MG/ML
5 INJECTION, SOLUTION INTRAVENOUS
Status: DISCONTINUED | OUTPATIENT
Start: 2017-09-12 | End: 2017-09-12 | Stop reason: HOSPADM

## 2017-09-12 RX ORDER — SODIUM CHLORIDE 0.9 % (FLUSH) 0.9 %
1-10 SYRINGE (ML) INJECTION AS NEEDED
Status: DISCONTINUED | OUTPATIENT
Start: 2017-09-12 | End: 2017-09-12 | Stop reason: HOSPADM

## 2017-09-12 RX ORDER — LIDOCAINE HYDROCHLORIDE 20 MG/ML
INJECTION, SOLUTION INFILTRATION; PERINEURAL AS NEEDED
Status: DISCONTINUED | OUTPATIENT
Start: 2017-09-12 | End: 2017-09-12 | Stop reason: SURG

## 2017-09-12 RX ORDER — POLYVINYL ALCOHOL 14 MG/ML
1 SOLUTION/ DROPS OPHTHALMIC
Status: DISCONTINUED | OUTPATIENT
Start: 2017-09-12 | End: 2017-09-12 | Stop reason: HOSPADM

## 2017-09-12 RX ORDER — IPRATROPIUM BROMIDE AND ALBUTEROL SULFATE 2.5; .5 MG/3ML; MG/3ML
3 SOLUTION RESPIRATORY (INHALATION) ONCE AS NEEDED
Status: DISCONTINUED | OUTPATIENT
Start: 2017-09-12 | End: 2017-09-12 | Stop reason: HOSPADM

## 2017-09-12 RX ORDER — HYDROCODONE BITARTRATE AND ACETAMINOPHEN 7.5; 325 MG/1; MG/1
1 TABLET ORAL ONCE AS NEEDED
Status: DISCONTINUED | OUTPATIENT
Start: 2017-09-12 | End: 2017-09-12 | Stop reason: HOSPADM

## 2017-09-12 RX ORDER — MEPERIDINE HYDROCHLORIDE 25 MG/ML
12.5 INJECTION INTRAMUSCULAR; INTRAVENOUS; SUBCUTANEOUS
Status: DISCONTINUED | OUTPATIENT
Start: 2017-09-12 | End: 2017-09-12 | Stop reason: HOSPADM

## 2017-09-12 RX ADMIN — CEFAZOLIN SODIUM 2 G: 2 SOLUTION INTRAVENOUS at 10:40

## 2017-09-12 RX ADMIN — LIDOCAINE HYDROCHLORIDE 1 EACH: 40 SOLUTION TOPICAL at 10:34

## 2017-09-12 RX ADMIN — GLYCOPYRROLATE 0.4 MG: 0.2 INJECTION, SOLUTION INTRAMUSCULAR; INTRAVENOUS at 11:23

## 2017-09-12 RX ADMIN — DEXAMETHASONE SODIUM PHOSPHATE 8 MG: 4 INJECTION, SOLUTION INTRAMUSCULAR; INTRAVENOUS at 11:05

## 2017-09-12 RX ADMIN — EPHEDRINE SULFATE 5 MG: 50 INJECTION INTRAMUSCULAR; INTRAVENOUS; SUBCUTANEOUS at 11:12

## 2017-09-12 RX ADMIN — ONDANSETRON HYDROCHLORIDE 4 MG: 2 SOLUTION INTRAMUSCULAR; INTRAVENOUS at 11:23

## 2017-09-12 RX ADMIN — ROCURONIUM BROMIDE 30 MG: 10 INJECTION INTRAVENOUS at 10:32

## 2017-09-12 RX ADMIN — FENTANYL CITRATE 100 MCG: 50 INJECTION INTRAMUSCULAR; INTRAVENOUS at 10:32

## 2017-09-12 RX ADMIN — DEXAMETHASONE SODIUM PHOSPHATE 4 MG: 4 INJECTION, SOLUTION INTRAMUSCULAR; INTRAVENOUS at 09:38

## 2017-09-12 RX ADMIN — FENTANYL CITRATE 50 MCG: 50 INJECTION INTRAMUSCULAR; INTRAVENOUS at 11:33

## 2017-09-12 RX ADMIN — EPHEDRINE SULFATE 5 MG: 50 INJECTION INTRAMUSCULAR; INTRAVENOUS; SUBCUTANEOUS at 11:04

## 2017-09-12 RX ADMIN — LIDOCAINE HYDROCHLORIDE 0.5 ML: 10 INJECTION, SOLUTION EPIDURAL; INFILTRATION; INTRACAUDAL; PERINEURAL at 07:47

## 2017-09-12 RX ADMIN — SODIUM CHLORIDE, POTASSIUM CHLORIDE, SODIUM LACTATE AND CALCIUM CHLORIDE: 600; 310; 30; 20 INJECTION, SOLUTION INTRAVENOUS at 11:21

## 2017-09-12 RX ADMIN — LIDOCAINE HYDROCHLORIDE 60 MG: 20 INJECTION, SOLUTION INFILTRATION; PERINEURAL at 10:32

## 2017-09-12 RX ADMIN — POLYVINYL ALCOHOL 1 DROP: 14 SOLUTION/ DROPS OPHTHALMIC at 13:19

## 2017-09-12 RX ADMIN — SODIUM CHLORIDE, POTASSIUM CHLORIDE, SODIUM LACTATE AND CALCIUM CHLORIDE 1000 ML: 600; 310; 30; 20 INJECTION, SOLUTION INTRAVENOUS at 07:49

## 2017-09-12 RX ADMIN — PROPOFOL 175 MG: 10 INJECTION, EMULSION INTRAVENOUS at 10:32

## 2017-09-12 RX ADMIN — SCOPOLAMINE 1 PATCH: 1 PATCH, EXTENDED RELEASE TRANSDERMAL at 09:38

## 2017-09-12 RX ADMIN — Medication 3 MG: at 11:23

## 2017-09-12 NOTE — PLAN OF CARE
Problem: Patient Care Overview (Adult)  Goal: Plan of Care Review  Outcome: Ongoing (interventions implemented as appropriate)    09/12/17 1025   Coping/Psychosocial Response Interventions   Plan Of Care Reviewed With patient   Patient Care Overview   Progress no change   Outcome Evaluation   Outcome Summary/Follow up Plan no changes in the preop holding phase

## 2017-09-12 NOTE — H&P (VIEW-ONLY)
"CC:  Painful lip lesion    History of Present Illness  Gracie Tapia complains of a skin lesion of the upper lip. The lesion has been present for since birth. The lesion has changed. Symptoms associated with the lesion are: bleeding, swelling, bluish discoloration, painful making it difficult to eat. This has been worsening for the last 5 months. Patient denies itching, drainage, infection.    She states she has no prior history of skin cancer.    She states that a biopsy has not been performed.     She states this area has been excised 3 times in [place] prior to the age of 13. She was told this was a hemangioma. Dr. Lazo in [place] excised this area again in 2003 removing scarring. She states that Dr. Lazo accidentally incised the white lip in vertical fashion.    Past Medical History:   Diagnosis Date   • Hemangioma of lip    • Neoplasm of uncertain behavior of lip      Past Surgical History:   Procedure Laterality Date   • OTHER SURGICAL HISTORY      Lip     Family History   Problem Relation Age of Onset   • No Known Problems Mother    • No Known Problems Father      Social History   Substance Use Topics   • Smoking status: Never Smoker   • Smokeless tobacco: Never Used   • Alcohol use No     Allergies:  Review of patient's allergies indicates no known allergies.  No current outpatient prescriptions on file.    Review of Systems   Constitutional: Negative for activity change, appetite change, chills, fatigue, fever and unexpected weight change.   HENT: Negative for congestion, dental problem, facial swelling and nosebleeds.    Eyes: Negative for discharge and redness.   Skin: Negative for color change, pallor and rash.   Hematological: Negative for adenopathy. Does not bruise/bleed easily.       /84  Temp 99.2 °F (37.3 °C)  Ht 62\" (157.5 cm)  Wt 122 lb (55.3 kg)  BMI 22.31 kg/m2    Physical Exam   Constitutional: She is oriented to person, place, and time. She appears well-developed and " well-nourished. She is cooperative. No distress.   HENT:   Head: Normocephalic and atraumatic.   Right Ear: External ear normal.   Left Ear: External ear normal.   Nose: Nose normal. No nasal deformity.   Mouth/Throat: Uvula is midline, oropharynx is clear and moist and mucous membranes are normal.   Upper lip with mild excoriation, tenderness to palpation, significant fibrosis, and bluish discoloration   Eyes: Conjunctivae and EOM are normal. Pupils are equal, round, and reactive to light. Right eye exhibits no discharge. Left eye exhibits no discharge. No scleral icterus.   Neck: Normal range of motion and phonation normal. Neck supple. No JVD present. No tracheal deviation present. No thyromegaly present.   Pulmonary/Chest: Effort normal. No stridor. No respiratory distress.   Musculoskeletal: Normal range of motion. She exhibits no edema or deformity.   Lymphadenopathy:     She has no cervical adenopathy.   Neurological: She is alert and oriented to person, place, and time. She has normal strength. No cranial nerve deficit. Coordination normal.   Skin: Skin is warm and dry. No rash noted. She is not diaphoretic. No erythema. No pallor.   Psychiatric: She has a normal mood and affect. Her speech is normal and behavior is normal. Judgment and thought content normal. Cognition and memory are normal.   Nursing note and vitals reviewed.            Gracie was seen today for follow-up.    Diagnoses and all orders for this visit:    Neoplasm of uncertain behavior of lip        Photographs were taken today. Postoperative care instructions were given.     Discussion of skin lesion. Discussed risks, benefits, alternatives, and possible complications of excision of the skin lesion with reconstruction utilizing local tissue rearrangement, full-thickness skin grafting, or local interpolated flaps. Risks include, but are not limited to: bleeding, infection, hematoma, recurrence, need for additional procedures, flap failure,  cosmetic deformity. Patient understands risks and would like to proceed with surgery.     I have recommended excision of the scarring vs hemangioma vs neuroma with permanent section analysis and reconstruction with linear closure in the operating room.      Return for 1 week postoperatively.    Chris Ham MD  08/23/17  2:31 PM

## 2017-09-12 NOTE — PLAN OF CARE
Problem: Patient Care Overview (Adult)  Goal: Plan of Care Review  Outcome: Ongoing (interventions implemented as appropriate)    09/12/17 1210   Coping/Psychosocial Response Interventions   Plan Of Care Reviewed With patient   Patient Care Overview   Progress improving   Outcome Evaluation   Outcome Summary/Follow up Plan denies pain. meets criteria for discharge from PACU at this time         Problem: Perioperative Period (Adult)  Goal: Signs and Symptoms of Listed Potential Problems Will be Absent or Manageable (Perioperative Period)  Outcome: Ongoing (interventions implemented as appropriate)

## 2017-09-12 NOTE — ANESTHESIA PROCEDURE NOTES
Airway  Urgency: elective    Airway not difficult    General Information and Staff    Patient location during procedure: OR  CRNA: VINCENT CORREIA    Indications and Patient Condition  Indications for airway management: airway protection    Preoxygenated: yes  Mask difficulty assessment: 1 - vent by mask    Final Airway Details  Final airway type: endotracheal airway      Successful airway: ETT  Cuffed: yes   Successful intubation technique: direct laryngoscopy  Endotracheal tube insertion site: oral  Blade: Cao  Blade size: #2  ETT size: 7.0 mm  Cormack-Lehane Classification: grade I - full view of glottis  Placement verified by: capnometry   Measured from: lips  ETT to lips (cm): 21  Number of attempts at approach: 1

## 2017-09-12 NOTE — PLAN OF CARE
Problem: Patient Care Overview (Adult)  Goal: Plan of Care Review  Outcome: Outcome(s) achieved Date Met:  09/12/17 09/12/17 6678   Coping/Psychosocial Response Interventions   Plan Of Care Reviewed With patient;spouse   Patient Care Overview   Progress improving   Outcome Evaluation   Outcome Summary/Follow up Plan discharge criteria met         Problem: Perioperative Period (Adult)  Goal: Signs and Symptoms of Listed Potential Problems Will be Absent or Manageable (Perioperative Period)  Outcome: Outcome(s) achieved Date Met:  09/12/17

## 2017-09-12 NOTE — OP NOTE
Preoperative diagnosis: Neoplasm of uncertain behavior of upper lip    Postoperative diagnosis: Scarring of right upper lip    Procedure(s) performed: 1. Excision of benign neoplasm of right upper lip, 5 mm x 35 mm  2.  Intermediate closure of upper lip 3.5 cm    Surgeon: Chris Ham M.D.    Anesthesia: General Endotracheal    IV fluids: Per anesthesia    Estimated blood loss: 5 mL    Drains: None    Implants: None    Specimens: Right upper lip scarring    Complications: None    Operative findings: The tender area in the right upper lip.  To be consistent with scarring.  The patient is at a history of multiple hemangioma excisions in this area.  No recurrent hemangioma was noted.    Details: After patient verification and consent was reviewed, the patient was taken to the operating room.  Induction of anesthesia was performed.    The upper lip was infiltrated with 1 mL 1% lidocaine with 1-100,000 epinephrine.  The patient was reprepped and draped.  Next    A 15 blade used make a fusiform incision measuring 5 mm x 35 mm encompassing the diseased mucosa at firmness in the right upper lip.  Then retracted the scarring using Adson's while I dissected around the scarring using curved iris scissors.  Scarring was sent for permanent pathology.  I removed some the deep tissue as palpation demonstrated some persistent areas of firmness and scarring.  I then closed the incision using buried 5-0 undyed Vicryl followed by a running, locking, 5-0 chromic suture.    Patient was weaned from anesthesia and transferred to PACU for recovery without immediate complication.

## 2017-09-12 NOTE — ANESTHESIA POSTPROCEDURE EVALUATION
Patient: Luh Tapia    Procedure Summary     Date Anesthesia Start Anesthesia Stop Room / Location    09/12/17 1027 1144  [facility] OR 03 / [facility] OR       Procedure Diagnosis Surgeon Provider    Excision of neoplasm of uncertain behavior of the upper lip with linear closure (82936) (Bilateral ) Neoplasm of uncertain behavior of lip  (Neoplasm of uncertain behavior of lip [D37.01]) MD Roddy Ramírez CRNA          Anesthesia Type: general  Last vitals  BP   120/80 (09/12/17 1230)    Temp        Pulse   82 (09/12/17 1230)   Resp   16 (09/12/17 1230)    SpO2   100 % (09/12/17 1230)      Post Anesthesia Care and Evaluation    Patient location during evaluation: PACU  Patient participation: complete - patient participated  Level of consciousness: awake and alert  Pain management: adequate  Airway patency: patent  Anesthetic complications: No anesthetic complications    Cardiovascular status: acceptable  Respiratory status: acceptable  Hydration status: acceptable    Comments: Blood pressure 120/80, pulse 82, temperature 97 °F (36.1 °C), temperature source Temporal Artery , resp. rate 16, last menstrual period 08/16/2017, SpO2 100 %.

## 2017-09-12 NOTE — PLAN OF CARE
Problem: Perioperative Period (Adult)  Goal: Signs and Symptoms of Listed Potential Problems Will be Absent or Manageable (Perioperative Period)  Outcome: Ongoing (interventions implemented as appropriate)    09/12/17 0980   Perioperative Period   Problems Assessed (Perioperative Period) pain;hypothermia;hypoxia/hypoxemia;perioperative injury;situational response   Problems Present (Perioperative Period) situational response;pain

## 2017-09-12 NOTE — ANESTHESIA PREPROCEDURE EVALUATION
Anesthesia Evaluation     Patient summary reviewed   history of anesthetic complications: PONV  NPO Solid Status: > 8 hours       Airway   Mallampati: II  TM distance: >3 FB  Neck ROM: full  Dental      Pulmonary    (-) asthma, sleep apnea, not a smoker  Cardiovascular - negative cardio ROS  Exercise tolerance: good (4-7 METS)        Neuro/Psych  (-) seizures, CVA  GI/Hepatic/Renal/Endo    (-) GERD, liver disease, no renal disease, diabetes    Musculoskeletal     Abdominal    Substance History      OB/GYN    (-)  Pregnant        Other                                        Anesthesia Plan    ASA 2     general     intravenous induction   Anesthetic plan and risks discussed with patient.

## 2017-09-12 NOTE — DISCHARGE INSTRUCTIONS

## 2017-09-17 LAB
CYTO UR: NORMAL
LAB AP CASE REPORT: NORMAL
LAB AP CLINICAL INFORMATION: NORMAL
Lab: NORMAL
PATH REPORT.FINAL DX SPEC: NORMAL
PATH REPORT.GROSS SPEC: NORMAL

## 2017-09-18 ENCOUNTER — OFFICE VISIT (OUTPATIENT)
Dept: OTOLARYNGOLOGY | Facility: CLINIC | Age: 32
End: 2017-09-18

## 2017-09-18 VITALS
HEART RATE: 80 BPM | WEIGHT: 123 LBS | BODY MASS INDEX: 22.63 KG/M2 | SYSTOLIC BLOOD PRESSURE: 129 MMHG | RESPIRATION RATE: 20 BRPM | TEMPERATURE: 97.8 F | DIASTOLIC BLOOD PRESSURE: 75 MMHG | HEIGHT: 62 IN

## 2017-09-18 DIAGNOSIS — I78.1 CAPILLARY HEMANGIOMA: Primary | ICD-10-CM

## 2017-09-18 PROCEDURE — 99024 POSTOP FOLLOW-UP VISIT: CPT | Performed by: OTOLARYNGOLOGY

## 2017-09-18 NOTE — PROGRESS NOTES
Ms. Tapia returns to the office following excision of a recurrent hemangioma of the right upper lip on 09/12/2017    Subjective: She reports moderate pain in the area.  No drainage.  No fevers or chills.    Objective: Incisions healing well.  Sutures were trimmed.  The right upper lip small in the left upper lip which may be result of swelling on the left and tightness from the excision on the right.    Assessment: Hemangioma of right upper lip    Recommendations: Continue Vaseline to the upper lip.  Follow-up in the office partially 6 weeks.  Tylenol and ibuprofen for pain

## 2017-10-25 ENCOUNTER — TELEPHONE (OUTPATIENT)
Dept: OTOLARYNGOLOGY | Facility: CLINIC | Age: 32
End: 2017-10-25

## 2017-10-25 RX ORDER — CLINDAMYCIN HYDROCHLORIDE 150 MG/1
300 CAPSULE ORAL 3 TIMES DAILY
Qty: 60 CAPSULE | Refills: 0 | Status: SHIPPED | OUTPATIENT
Start: 2017-10-25 | End: 2017-10-30

## 2017-10-25 NOTE — TELEPHONE ENCOUNTER
I called patient about appt. She states she has an appt with Dr. Ham on 10/30/17. She is having pain of her lip and clear drainage. I have spoke with Dr. Ham and we will proceed with Clindamycin. Patient notified

## 2017-10-30 ENCOUNTER — OFFICE VISIT (OUTPATIENT)
Dept: OTOLARYNGOLOGY | Facility: CLINIC | Age: 32
End: 2017-10-30

## 2017-10-30 VITALS
WEIGHT: 123 LBS | TEMPERATURE: 97.8 F | HEART RATE: 73 BPM | HEIGHT: 62 IN | SYSTOLIC BLOOD PRESSURE: 113 MMHG | BODY MASS INDEX: 22.63 KG/M2 | DIASTOLIC BLOOD PRESSURE: 72 MMHG | RESPIRATION RATE: 20 BRPM

## 2017-10-30 DIAGNOSIS — I78.1 CAPILLARY HEMANGIOMA: Primary | ICD-10-CM

## 2017-10-30 PROCEDURE — 99213 OFFICE O/P EST LOW 20 MIN: CPT | Performed by: OTOLARYNGOLOGY

## 2017-10-30 NOTE — PROGRESS NOTES
PRIMARY CARE PROVIDER: Mary Urias MD  REFERRING PROVIDER: No ref. provider found    Chief Complaint   Patient presents with   • Follow-up     RIGHT UPPER HEMANGIOMA       Subjective   History of Present Illness:  Luh Tapia is a  31 y.o. female who presents for wound check.  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  She called last week complaining of clear drainage from the lip.  We placed her on clindamycin this is resolved.  She is very pleased with the appearance of her lip.  She does seem to maybe some extra fullness on the left side.  Denies any significant pain.    Review of Systems:  Review of Systems   Constitutional: Negative for chills and fever.   HENT: Negative for drooling.    Hematological: Negative for adenopathy. Does not bruise/bleed easily.   All other systems reviewed and are negative.      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    Right Upper Lip   • PONV (postoperative nausea and vomiting)     EXTREME     Past Surgical History:   Procedure Laterality Date   • BREAST SURGERY Bilateral 2014    IMPLANTS    • HEAD/NECK LESION/CYST EXCISION Bilateral 9/12/2017    Procedure: Excision of neoplasm of uncertain behavior of the upper lip with linear closure (54631);  Surgeon: Chris Ham MD;  Location: [facility] OR;  Service:    • LIP REPAIR      OVER LAST 26 YEARS HAS HAD 4 OTHER SURGERIES ON LIP   • OTHER SURGICAL HISTORY      Lip     Family History   Problem Relation Age of Onset   • No Known Problems Mother    • No Known Problems Father      Social History   Substance Use Topics   • Smoking status: Never Smoker   • Smokeless tobacco: Never Used   • Alcohol use No     Allergies:  Review of patient's allergies indicates no known allergies.    Current Outpatient Prescriptions:   •  fexofenadine (ALLEGRA ALLERGY) 180 MG tablet, Take 180 mg by mouth Daily. OTC, Disp: , Rfl:       Objective      Vital Signs:  Temp:  [97.8 °F (36.6 °C)] 97.8 °F (36.6 °C)  Heart Rate:  [73] 73  Resp:  [20] 20  BP: (113)/(72) 113/72    Physical Exam:  Physical Exam   Constitutional: She is oriented to person, place, and time. She appears well-developed and well-nourished. She is cooperative. No distress.   HENT:   Head: Normocephalic and atraumatic.   Right Ear: External ear normal.   Left Ear: External ear normal.   Nose: Nose normal.   Mouth/Throat: Oropharynx is clear and moist.   Right upper lip with palpable fibrosis.  No drainage or erythema.     Eyes: Conjunctivae and EOM are normal. Pupils are equal, round, and reactive to light. Right eye exhibits no discharge. Left eye exhibits no discharge. No scleral icterus.   Neck: Normal range of motion. Neck supple. No JVD present. No tracheal deviation present. No thyromegaly present.   Pulmonary/Chest: Effort normal. No stridor.   Musculoskeletal: Normal range of motion. She exhibits no edema or deformity.   Lymphadenopathy:     She has no cervical adenopathy.   Neurological: She is alert and oriented to person, place, and time. She has normal strength. No cranial nerve deficit. Coordination normal.   Skin: Skin is warm and dry. No rash noted. She is not diaphoretic. No erythema. No pallor.   Psychiatric: She has a normal mood and affect. Her speech is normal and behavior is normal. Judgment and thought content normal. Cognition and memory are normal.   Nursing note and vitals reviewed.      Results Review:       Assessment   Assessment:  1. Capillary hemangioma        Plan   Plan:    Drainage has resolved.  Allow fibrosis to soften prior to considering any additional lip procedures for symmetry.  Follow-up in 6 months.      There are no Patient Instructions on file for this visit.  No Follow-up on file.    My findings and recommendations were discussed and questions were answered.     Chris Ham MD  10/30/17  1:17 PM

## 2018-04-30 ENCOUNTER — OFFICE VISIT (OUTPATIENT)
Dept: OTOLARYNGOLOGY | Facility: CLINIC | Age: 33
End: 2018-04-30

## 2018-04-30 VITALS
WEIGHT: 124 LBS | TEMPERATURE: 98 F | HEIGHT: 62 IN | RESPIRATION RATE: 16 BRPM | SYSTOLIC BLOOD PRESSURE: 122 MMHG | DIASTOLIC BLOOD PRESSURE: 72 MMHG | BODY MASS INDEX: 22.82 KG/M2 | HEART RATE: 76 BPM

## 2018-04-30 DIAGNOSIS — I78.1 CAPILLARY HEMANGIOMA: Primary | ICD-10-CM

## 2018-04-30 PROCEDURE — 99213 OFFICE O/P EST LOW 20 MIN: CPT | Performed by: OTOLARYNGOLOGY

## 2018-04-30 RX ORDER — LORATADINE 10 MG/1
10 TABLET ORAL DAILY
COMMUNITY
End: 2021-04-12

## 2018-04-30 NOTE — PROGRESS NOTES
PRIMARY CARE PROVIDER: Mary Urias MD  REFERRING PROVIDER: No ref. provider found    Upper lip hemangioma s/p excision      Subjective   History of Present Illness:  Luh Tapia is a  32 y.o. female who presents for wound check.  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  She is very pleased with the appearance of her lip.  She does seem to maybe some extra fullness on the left side.  She would like the LEFT lip reduced in size and the right lip scar addressed.    Review of Systems:  Review of Systems   Constitutional: Negative for chills and fever.   HENT: Negative for drooling.    Hematological: Negative for adenopathy. Does not bruise/bleed easily.   All other systems reviewed and are negative.      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    Right Upper Lip   • PONV (postoperative nausea and vomiting)     EXTREME     Past Surgical History:   Procedure Laterality Date   • BREAST SURGERY Bilateral 2014    IMPLANTS    • HEAD/NECK LESION/CYST EXCISION Bilateral 9/12/2017    Procedure: Excision of neoplasm of uncertain behavior of the upper lip with linear closure (80309);  Surgeon: Chris Ham MD;  Location: [facility];  Service:    • LIP REPAIR      OVER LAST 26 YEARS HAS HAD 4 OTHER SURGERIES ON LIP   • OTHER SURGICAL HISTORY      Lip     Family History   Problem Relation Age of Onset   • No Known Problems Mother    • No Known Problems Father      Social History   Substance Use Topics   • Smoking status: Never Smoker   • Smokeless tobacco: Never Used   • Alcohol use No     Allergies:  Review of patient's allergies indicates no known allergies.    Current Outpatient Prescriptions:   •  loratadine (CLARITIN) 10 MG tablet, Take 10 mg by mouth Daily., Disp: , Rfl:       Objective     Vital Signs:  Temp:  [98 °F (36.7 °C)] 98 °F (36.7 °C)  Heart Rate:  [76] 76  Resp:  [16] 16  BP: (122)/(72)  122/72    Physical Exam:  Physical Exam   Constitutional: She is oriented to person, place, and time. She appears well-developed and well-nourished. She is cooperative. No distress.   HENT:   Head: Normocephalic and atraumatic.   Right Ear: External ear normal.   Left Ear: External ear normal.   Nose: Nose normal.   Mouth/Throat: Oropharynx is clear and moist.   Right upper lip with palpable fibrosis.  No drainage or erythema.     Eyes: Conjunctivae and EOM are normal. Pupils are equal, round, and reactive to light. Right eye exhibits no discharge. Left eye exhibits no discharge. No scleral icterus.   Neck: Normal range of motion. Neck supple. No JVD present. No tracheal deviation present. No thyromegaly present.   Pulmonary/Chest: Effort normal. No stridor.   Musculoskeletal: Normal range of motion. She exhibits no edema or deformity.   Lymphadenopathy:     She has no cervical adenopathy.   Neurological: She is alert and oriented to person, place, and time. She has normal strength. No cranial nerve deficit. Coordination normal.   Skin: Skin is warm and dry. No rash noted. She is not diaphoretic. No erythema. No pallor.   Psychiatric: She has a normal mood and affect. Her speech is normal and behavior is normal. Judgment and thought content normal. Cognition and memory are normal.   Nursing note and vitals reviewed.                Assessment   Assessment:  1. Capillary hemangioma        Plan   Plan:    We discussed the possibility of a slight reduction of the left upper lip to match the right.  She would like to proceed has not in office procedure.  We will send her quote.  Also discussed filler to the right upper lip scar to help with the depressed appearance.  We discussed excision and closure of that scar but she does not want to go through the downtime of that procedure.    Return for 1 week postoperatively.    My findings and recommendations were discussed and questions were answered.     Chris Ham,  MD  04/30/18  1:50 PM

## 2018-07-16 ENCOUNTER — PROCEDURE VISIT (OUTPATIENT)
Dept: OTOLARYNGOLOGY | Facility: CLINIC | Age: 33
End: 2018-07-16

## 2018-07-16 VITALS
DIASTOLIC BLOOD PRESSURE: 76 MMHG | HEIGHT: 62 IN | BODY MASS INDEX: 22.82 KG/M2 | RESPIRATION RATE: 20 BRPM | SYSTOLIC BLOOD PRESSURE: 125 MMHG | HEART RATE: 80 BPM | TEMPERATURE: 97.8 F | WEIGHT: 124 LBS

## 2018-07-16 DIAGNOSIS — I78.1 CAPILLARY HEMANGIOMA: Primary | ICD-10-CM

## 2018-07-16 RX ORDER — CEPHALEXIN 500 MG/1
500 CAPSULE ORAL 4 TIMES DAILY
Qty: 28 CAPSULE | Refills: 0 | Status: SHIPPED | OUTPATIENT
Start: 2018-07-16 | End: 2018-08-06

## 2018-07-16 RX ORDER — ONDANSETRON HYDROCHLORIDE 8 MG/1
8 TABLET, FILM COATED ORAL EVERY 8 HOURS PRN
Qty: 8 TABLET | Refills: 0 | Status: SHIPPED | OUTPATIENT
Start: 2018-07-16 | End: 2021-04-12

## 2018-07-16 RX ORDER — HYDROCODONE BITARTRATE AND ACETAMINOPHEN 7.5; 325 MG/1; MG/1
1 TABLET ORAL EVERY 4 HOURS PRN
Qty: 20 TABLET | Refills: 0 | Status: SHIPPED | OUTPATIENT
Start: 2018-07-16 | End: 2018-07-23

## 2018-07-16 NOTE — PROGRESS NOTES
Preprocedure diagnosis: Upper lip asymmetry due to prior history of a removal of a hemangioma    Post procedure diagnosis:  Upper lip asymmetry due to prior history of a removal of a hemangioma    Procedure performed: Cosmetic left upper lip reduction    Surgeon: Chris Ham M.D.    Anesthesia: Left infraorbital nerve block and direct infiltration of the red lip, 2cc 1% lidocaine with 1:100,000 epinephrine     Details: After patient verification, and consent was obtained, the asymmetry to the left upper lip was marked using a skin pen after cleansing the lip with alcohol.    This was verified with the patient.  Marking measured 35 mm x 7 mm.      A left infraorbital nerve block was performed using 1 mL of 1% lidocaine with 1 100,000 epinephrine.    Approximately 10 minutes, the lip was tested for anesthesia.  Direct injection in the area of the planned excision was also performed within the an additional 1 mL 1% lidocaine with 1-100,000.    The lip was cleansed with Hibiclens.  A 15 blade was used to make a fusiform incision on the premarked area.  This was then removed using curved iris scissors.  I then closed the incision using buried 5-0 undyed Vicryl followed by a running, locking, 4-0 chromic suture.

## 2018-08-06 ENCOUNTER — OFFICE VISIT (OUTPATIENT)
Dept: OTOLARYNGOLOGY | Facility: CLINIC | Age: 33
End: 2018-08-06

## 2018-08-06 VITALS
DIASTOLIC BLOOD PRESSURE: 78 MMHG | BODY MASS INDEX: 22.82 KG/M2 | SYSTOLIC BLOOD PRESSURE: 135 MMHG | HEIGHT: 62 IN | HEART RATE: 80 BPM | WEIGHT: 124 LBS | TEMPERATURE: 98 F | RESPIRATION RATE: 20 BRPM

## 2018-08-06 DIAGNOSIS — I78.1 CAPILLARY HEMANGIOMA: Primary | ICD-10-CM

## 2018-08-06 PROCEDURE — 99024 POSTOP FOLLOW-UP VISIT: CPT | Performed by: OTOLARYNGOLOGY

## 2018-08-06 NOTE — PROGRESS NOTES
"Luh Tapia returns to the office following cosmetic left upper lip reduction due to scarring from removal of a prior upper lip hemangioma.  This lip reduction was performed on 07/16/2018.    SUBJECTIVE:  She reports persistent fullness in the upper lip fibrosis and wonders if this is recurrent hemangioma.  She still has some tenderness along the incision line of the upper lip.    OBJECTIVE:  /78   Pulse 80   Temp 98 °F (36.7 °C)   Resp 20   Ht 157.5 cm (62\")   Wt 56.2 kg (124 lb)   BMI 22.68 kg/m²   She has significant amount of submucosal fibrosis of the upper lip.  The incision line demonstrates some fibrosis and superficial ulceration.  This was treated with topical numbing with Cetacaine followed by silver nitrate cautery.    ASSESSMENT:  Luh was seen today for post-op.    Diagnoses and all orders for this visit:    Capillary hemangioma        PLAN:   I cauterized the fibrotic incision line to help this reactive epithelium heal with healthy tissue.  I would like to see her back in approximately 2 weeks.  She would like to perform additional excision of the fibrous tissue but I have recommended steroid injection until after this is mucosalized.  I have held off on the Juvederm Vollure injection today due to the fact that this is still technically an open wound.      Chris Ham MD   08/06/2018  11:01 AM    "

## 2018-08-20 ENCOUNTER — OFFICE VISIT (OUTPATIENT)
Dept: OTOLARYNGOLOGY | Facility: CLINIC | Age: 33
End: 2018-08-20

## 2018-08-20 VITALS
BODY MASS INDEX: 21.9 KG/M2 | HEIGHT: 62 IN | DIASTOLIC BLOOD PRESSURE: 64 MMHG | TEMPERATURE: 99.1 F | WEIGHT: 119 LBS | SYSTOLIC BLOOD PRESSURE: 110 MMHG | HEART RATE: 74 BPM

## 2018-08-20 DIAGNOSIS — L90.5 SCAR: ICD-10-CM

## 2018-08-20 DIAGNOSIS — I78.1 CAPILLARY HEMANGIOMA: Primary | ICD-10-CM

## 2018-08-20 NOTE — PROGRESS NOTES
Preprocedure diagnosis: Scarring of the lip following excision of a hemangioma    Post procedure diagnosis: Scarring of the lip following excision of a hemangioma    Procedure performed: Cosmetic scar reduction and hyaluronic acid lip filler    Surgeon: Chris Ham M.D.    Anesthesia: Topical with BLT, Cetacaine, and 0.8 mL of an 8%: 20% mixture of 1% lidocaine with 100,000 epinephrine and sodium bicarbonate (Neut)    Details: Photographs are taken.  BLT was applied topically to the skin after it was cleansed with alcohol.  I then placed a 2 x 2 soaked in Cetacaine intraorally.  After approximately 15 minutes these were removed.  I infiltrated in the V2 block fashion bilaterally and micro-aliquots within the gingivolabial sulcus of the upper lip with 1% lidocaine with 1 100,000 epinephrine mixed with sodium bicarbonate.    The skin was again cleansed with alcohol.  I then infiltrated 0.8 mL of Juvederm Vollure the lower into the below listed pattern:      Vollure XC 1 mL  IN [id number]  Lot: [id number]  Exp: 2020-03--01

## 2019-01-09 ENCOUNTER — OFFICE VISIT (OUTPATIENT)
Dept: OTOLARYNGOLOGY | Facility: CLINIC | Age: 34
End: 2019-01-09

## 2019-01-09 VITALS
WEIGHT: 157 LBS | DIASTOLIC BLOOD PRESSURE: 79 MMHG | HEART RATE: 68 BPM | TEMPERATURE: 98 F | SYSTOLIC BLOOD PRESSURE: 116 MMHG | BODY MASS INDEX: 28.89 KG/M2 | RESPIRATION RATE: 20 BRPM | HEIGHT: 62 IN

## 2019-01-09 DIAGNOSIS — I78.1 CAPILLARY HEMANGIOMA: Primary | ICD-10-CM

## 2019-01-09 DIAGNOSIS — L90.5 SCAR: ICD-10-CM

## 2019-01-09 PROCEDURE — 99213 OFFICE O/P EST LOW 20 MIN: CPT | Performed by: OTOLARYNGOLOGY

## 2019-01-10 NOTE — PROGRESS NOTES
PRIMARY CARE PROVIDER: Mary Urias MD  REFERRING PROVIDER: No ref. provider found    Chief Complaint   Patient presents with   • Follow-up     Upper Lip       Subjective   History of Present Illness:  Luh Tapia is a  33 y.o. female with a history of an upper lip hemangioma has had multiple surgical procedures to correct the deformity and she also has a persistent scar of the right upper cutaneous lip as a result of one of the prior surgeries.  [place] of [place].  Most recently, she is status post cosmetic scar reduction and hyaluronic lip filler on 08/20/2018.  She is very pleased with results of this.  She does wish her lips taper little bit more laterally, and complains of fullness in the lateral upper lip area.  She also reports that the fillers starting to dissolve in her upper lip and wishes that there is something more permanent.    Review of Systems:  Review of Systems   Constitutional: Negative for chills and fever.   Skin: Positive for wound.   Neurological: Positive for facial asymmetry.       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    Right Upper Lip   • PONV (postoperative nausea and vomiting)     EXTREME     Past Surgical History:   Procedure Laterality Date   • BREAST SURGERY Bilateral 2014    IMPLANTS    • HEAD/NECK LESION/CYST EXCISION Bilateral 9/12/2017    Procedure: Excision of neoplasm of uncertain behavior of the upper lip with linear closure (35436);  Surgeon: Chris Ham MD;  Location: [facility];  Service:    • LIP REPAIR      OVER LAST 26 YEARS HAS HAD 4 OTHER SURGERIES ON LIP   • OTHER SURGICAL HISTORY      Lip     Family History   Problem Relation Age of Onset   • No Known Problems Mother    • No Known Problems Father      Social History     Tobacco Use   • Smoking status: Never Smoker   • Smokeless tobacco: Never Used   Substance Use Topics   • Alcohol use: No   • Drug use: No      Allergies:  Patient has no known allergies.    Current Outpatient Medications:   •  loratadine (CLARITIN) 10 MG tablet, Take 10 mg by mouth Daily., Disp: , Rfl:   •  ondansetron (ZOFRAN) 8 MG tablet, Take 1 tablet by mouth Every 8 (Eight) Hours As Needed for Nausea or Vomiting., Disp: 8 tablet, Rfl: 0      Objective     Vital Signs:  Temp:  [98 °F (36.7 °C)] 98 °F (36.7 °C)  Heart Rate:  [68] 68  Resp:  [20] 20  BP: (116)/(79) 116/79    Physical Exam:  Physical Exam   Constitutional: She is oriented to person, place, and time. She appears well-developed and well-nourished. She is cooperative. No distress.   HENT:   Head: Normocephalic and atraumatic.   Right Ear: External ear normal.   Left Ear: External ear normal.   Nose: Nose normal.   The upper lip is well healed.  Laterally, there is a lack of tapering the lips.  This is actually a result of the shortening of her upper red lip, and nothing has been done laterally.  As a result, she has very minimal taper to her upper lip.  Vertical lip scar has started become more noticeable.   Eyes: Conjunctivae and EOM are normal. Pupils are equal, round, and reactive to light. Right eye exhibits no discharge. Left eye exhibits no discharge. No scleral icterus.   Neck: Normal range of motion. Neck supple. No JVD present. No tracheal deviation present. No thyromegaly present.   Pulmonary/Chest: Effort normal. No stridor.   Musculoskeletal: Normal range of motion. She exhibits no edema or deformity.   Lymphadenopathy:     She has no cervical adenopathy.   Neurological: She is alert and oriented to person, place, and time. She has normal strength. No cranial nerve deficit. Coordination normal.   Skin: Skin is warm and dry. No rash noted. She is not diaphoretic. No erythema. No pallor.   Psychiatric: She has a normal mood and affect. Her speech is normal and behavior is normal. Judgment and thought content normal. Cognition and memory are normal.   Nursing note and vitals  reviewed.              Assessment   Assessment:  1. Capillary hemangioma    2. Scar        Plan   Plan:    I have offered cosmetic reduction of the lateral upper lip bilaterally in order to help improve the contour of the lip.  She also like some more  the right upper lip scar.  I discussed with her the options of more long-term fillers including Belafill.  I do not perform this.  Will consider, but would wait until the lip heals to decrease the risk of infection.  A cosmetic quote will be sent.  She would like this performed as an in office procedure.        Return for 1 week postoperatively.    My findings and recommendations were discussed and questions were answered.     Chris Ham MD  01/09/19  6:21 PM

## 2019-02-07 ENCOUNTER — TELEPHONE (OUTPATIENT)
Dept: OTOLARYNGOLOGY | Facility: CLINIC | Age: 34
End: 2019-02-07

## 2019-03-01 ENCOUNTER — PROCEDURE VISIT (OUTPATIENT)
Dept: OTOLARYNGOLOGY | Facility: CLINIC | Age: 34
End: 2019-03-01

## 2019-03-01 VITALS — TEMPERATURE: 98.3 F | RESPIRATION RATE: 16 BRPM | WEIGHT: 123 LBS | BODY MASS INDEX: 22.63 KG/M2 | HEIGHT: 62 IN

## 2019-03-01 DIAGNOSIS — L90.5 SCAR: Primary | ICD-10-CM

## 2019-03-01 RX ORDER — CEPHALEXIN 500 MG/1
500 CAPSULE ORAL 3 TIMES DAILY
Qty: 21 CAPSULE | Refills: 0 | Status: SHIPPED | OUTPATIENT
Start: 2019-03-01 | End: 2019-03-08

## 2019-03-01 RX ORDER — HYDROXYZINE HYDROCHLORIDE 25 MG/1
TABLET, FILM COATED ORAL
COMMUNITY
Start: 2019-02-11 | End: 2021-04-12

## 2019-03-01 NOTE — PROGRESS NOTES
Preprocedure diagnosis: Lip asymmetry due to history of a hemangioma of the upper lip    Post procedure diagnosis: Lip asymmetry due to history of a hemangioma of the upper lip    Procedure performed: Cosmetic lip countouring    Surgeon: Chris Ham M.D.    Anesthesia: Local (3 cc of 1% lidocaine with 1:100,000 epinephrine)    Details: The patient's lip was cleansed with alcohol.  The areas of lateral fullness were marked in the area of desired tapering it.  I then marked a fusiform type excision areas of the lateral lip spanning the wet dry junction of the vermilion.  I then performed bilateral V2 blocks using 1% lidocaine with 1-100,000 epinephrine.    The lip was cleansed with Hibiclens.  The planned incisions were created using a 15 blade and the mucosa was removed in the submucosal plane utilizing curved iris scissors.  Hemostasis was obtained with bipolar cautery set at 12.    I then closed the lip with running, locking 4-0 chromic suture.    The patient tolerated the procedure with minimal discomfort.

## 2019-03-08 ENCOUNTER — OFFICE VISIT (OUTPATIENT)
Dept: OTOLARYNGOLOGY | Facility: CLINIC | Age: 34
End: 2019-03-08

## 2019-03-08 VITALS
HEIGHT: 62 IN | HEART RATE: 80 BPM | RESPIRATION RATE: 20 BRPM | TEMPERATURE: 98 F | DIASTOLIC BLOOD PRESSURE: 76 MMHG | BODY MASS INDEX: 22.63 KG/M2 | WEIGHT: 123 LBS | SYSTOLIC BLOOD PRESSURE: 115 MMHG

## 2019-03-08 DIAGNOSIS — L90.5 SCAR: Primary | ICD-10-CM

## 2019-03-08 DIAGNOSIS — I78.1 CAPILLARY HEMANGIOMA: ICD-10-CM

## 2019-03-08 PROCEDURE — 99024 POSTOP FOLLOW-UP VISIT: CPT | Performed by: NURSE PRACTITIONER

## 2019-03-08 NOTE — PROGRESS NOTES
"Luh Tapia returns to the office following cosmetic contouring of the upper lip on 3/1/19.     SUBJECTIVE:  She is doing very well. She denies any fevers, chills, bleeding, or drainage. She has had no pain. She does report significant swelling of her upper lip postoperatively, but states this continues to improve.    OBJECTIVE:  /76   Pulse 80   Temp 98 °F (36.7 °C)   Resp 20   Ht 157.5 cm (62\")   Wt 55.8 kg (123 lb)   BMI 22.50 kg/m²   Incision is healing very well. No sutures. No signs of infection. Moderate edema noted to the upper lip    ASSESSMENT:  Luh was seen today for post-op.    Diagnoses and all orders for this visit:    Scar    Capillary hemangioma        PLAN:   Continue vaseline to upper lip incision. Call for any problems or worsening symptoms. Follow-up in 6 weeks.       Yue Diaz, ISHAN   03/08/2019  11:28 AM      "

## 2019-05-06 ENCOUNTER — OFFICE VISIT (OUTPATIENT)
Dept: OTOLARYNGOLOGY | Facility: CLINIC | Age: 34
End: 2019-05-06

## 2019-05-06 VITALS
WEIGHT: 123 LBS | DIASTOLIC BLOOD PRESSURE: 75 MMHG | HEART RATE: 79 BPM | HEIGHT: 62 IN | TEMPERATURE: 98 F | BODY MASS INDEX: 22.63 KG/M2 | RESPIRATION RATE: 20 BRPM | SYSTOLIC BLOOD PRESSURE: 134 MMHG

## 2019-05-06 DIAGNOSIS — M95.0 NASAL DEFORMITY, ACQUIRED: ICD-10-CM

## 2019-05-06 DIAGNOSIS — L90.5 SCAR: Primary | ICD-10-CM

## 2019-05-06 DIAGNOSIS — J34.89 NASAL VALVE STENOSIS: ICD-10-CM

## 2019-05-06 DIAGNOSIS — I78.1 CAPILLARY HEMANGIOMA: ICD-10-CM

## 2019-05-06 PROCEDURE — 99213 OFFICE O/P EST LOW 20 MIN: CPT | Performed by: OTOLARYNGOLOGY

## 2019-05-06 NOTE — PROGRESS NOTES
PRIMARY CARE PROVIDER: Mary Urias MD  REFERRING PROVIDER: No ref. provider found    Chief Complaint   Patient presents with   • Follow-up     Upper Lip        Subjective   History of Present Illness:  Luh Tapia is a  33 y.o. female who is here to follow-up status post cosmetic contouring of the upper lip on 3/1/19. She denies any pain. She is overall very pleased with the results. She does point out mild asymmetry of the cupids bow.    She also complains of nasal obstruction. The symptoms are localized to both nasal cavities. The patient has had moderate symptoms. The symptoms have been present for the last several years. There have been no identified factors that aggravate the symptoms. The patient has been trying Flonase with no appreciable improvement. She also does not like the dorsal hump of her nose. She is interested in possibly proceeding with surgery towards the end of the year.     Review of Systems:  Review of Systems   Constitutional: Negative for chills and fever.   HENT: Positive for congestion. Negative for ear discharge, ear pain, sinus pressure, sinus pain, sore throat and voice change.    Respiratory: Negative for cough and shortness of breath.    Cardiovascular: Negative for chest pain.   Neurological: Positive for facial asymmetry.       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    Right Upper Lip   • PONV (postoperative nausea and vomiting)     EXTREME     Past Surgical History:   Procedure Laterality Date   • BREAST SURGERY Bilateral 2014    IMPLANTS    • HEAD/NECK LESION/CYST EXCISION Bilateral 9/12/2017    Procedure: Excision of neoplasm of uncertain behavior of the upper lip with linear closure (38510);  Surgeon: Chris Ham MD;  Location: [facility] OR;  Service:    • LIP REPAIR      OVER LAST 26 YEARS HAS HAD 4 OTHER SURGERIES ON LIP   • OTHER SURGICAL HISTORY      Lip     Family History   Problem Relation Age of Onset   •  No Known Problems Mother    • No Known Problems Father      Social History     Tobacco Use   • Smoking status: Never Smoker   • Smokeless tobacco: Never Used   Substance Use Topics   • Alcohol use: No   • Drug use: No     Allergies:  Patient has no known allergies.    Current Outpatient Medications:   •  hydrOXYzine (ATARAX) 25 MG tablet, , Disp: , Rfl:   •  loratadine (CLARITIN) 10 MG tablet, Take 10 mg by mouth Daily., Disp: , Rfl:   •  ondansetron (ZOFRAN) 8 MG tablet, Take 1 tablet by mouth Every 8 (Eight) Hours As Needed for Nausea or Vomiting., Disp: 8 tablet, Rfl: 0      Objective     Vital Signs:  Temp:  [98 °F (36.7 °C)] 98 °F (36.7 °C)  Heart Rate:  [79] 79  Resp:  [20] 20  BP: (134)/(75) 134/75    Physical Exam:  Physical Exam   Constitutional: She is oriented to person, place, and time. She appears well-developed and well-nourished. She is cooperative. No distress.   HENT:   Head: Normocephalic and atraumatic.   Right Ear: External ear normal.   Left Ear: External ear normal.   Nose: Septal deviation (mild rightward) present.   Mouth/Throat: Uvula is midline and oropharynx is clear and moist.   Septal deviation to the right. Mild alar collapse- patient reported improvement in airflow with alar support. She does not like the appearance with supporting the ala.  Dorsal hump present - radix is low making this somewhat of a pseudohump.  Upper lip is well healed. There is mild asymmetry of the cupids bow. There is also moderate fibrosis of the upper lip.    Eyes: Conjunctivae, EOM and lids are normal.   Neck: Phonation normal. Neck supple.   Pulmonary/Chest: Effort normal. No respiratory distress.   Lymphadenopathy:     She has no cervical adenopathy.   Neurological: She is alert and oriented to person, place, and time. She has normal strength. No cranial nerve deficit.   Skin: Skin is warm, dry and intact. No lesion noted.   Psychiatric: She has a normal mood and affect. Her speech is normal and behavior is  normal.       Assessment   Assessment:  1. Scar    2. Capillary hemangioma    3. Nasal deformity, acquired    4. Nasal valve stenosis        Plan   Plan:    Her upper lip is healing very well. There is some fibrosis present. I do not recommend kenalog injection due to her history. I would also recommended waiting another 6-12 months before we do anything else to her lip. The patient is in agreement with this.     I have also discussed repair of nasal vestibular stenosis with alar batten graft. However, this would make her nose wider and she is not interested in this. She does want the dorsal hump reduced. She is aware this would be cosmetic. We will provide her with a quote for cosmetic rhinoplasty. I would like to see her back towards the end of the year for reevaluation and discussion of possible dorsal hump reduction versus radix augmentation.     QUALITY MEASURES    Body Mass Index Screening and Follow-Up Plan  Body mass index is 22.5 kg/m².  Patient's Body mass index is 22.5 kg/m². BMI is within normal parameters. No follow-up required..    Tobacco Use: Screening and Cessation Intervention  Social History    Tobacco Use      Smoking status: Never Smoker      Smokeless tobacco: Never Used      Return in about 6 months (around 11/6/2019), or if symptoms worsen or fail to improve, for Recheck.    My findings and recommendations were discussed and questions were answered.     Chris Ham MD  05/06/19  4:55 PM

## 2019-10-23 ENCOUNTER — OFFICE VISIT (OUTPATIENT)
Dept: OTOLARYNGOLOGY | Facility: CLINIC | Age: 34
End: 2019-10-23

## 2019-10-23 VITALS
HEIGHT: 62 IN | WEIGHT: 124 LBS | SYSTOLIC BLOOD PRESSURE: 126 MMHG | HEART RATE: 80 BPM | TEMPERATURE: 98 F | RESPIRATION RATE: 20 BRPM | DIASTOLIC BLOOD PRESSURE: 78 MMHG | BODY MASS INDEX: 22.82 KG/M2

## 2019-10-23 DIAGNOSIS — I78.1 CAPILLARY HEMANGIOMA: ICD-10-CM

## 2019-10-23 DIAGNOSIS — L90.5 SCAR: Primary | ICD-10-CM

## 2019-10-23 DIAGNOSIS — J34.2 NASAL SEPTAL DEVIATION: ICD-10-CM

## 2019-10-23 DIAGNOSIS — J34.89 NASAL VALVE STENOSIS: ICD-10-CM

## 2019-10-23 PROCEDURE — 99213 OFFICE O/P EST LOW 20 MIN: CPT | Performed by: OTOLARYNGOLOGY

## 2019-10-23 NOTE — PROGRESS NOTES
PRIMARY CARE PROVIDER: Mary Urias MD  REFERRING PROVIDER: No ref. provider found    Chief Complaint   Patient presents with   • Follow-up     Upper Lip        Subjective   History of Present Illness:  Luh Tapia is a  33 y.o. female for ongoing evaluation of her upper lip.  Most recently, she is status post recontouring of the upper lip on March 1, 2019.  She is very happy with the result.  She denies any pain in the area.  She does wish that the fullness to the lateral white lip bilaterally was less.  She would like a little bit more red lip show centrally.  Again, she denies any pain, drainage.  Her symptoms are mild.  Psychologically, she is starting to feel better about herself worth.    She thinks she may be pregnant.  She is concerned of passing on a hemangioma to her child.    Review of Systems:  Review of Systems   Constitutional: Negative for chills, fatigue and unexpected weight change.   HENT: Positive for congestion. Negative for dental problem and mouth sores.    Musculoskeletal: Negative for neck pain.   Skin: Negative for wound.   Neurological: Positive for facial asymmetry.       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    Right Upper Lip   • PONV (postoperative nausea and vomiting)     EXTREME     Past Surgical History:   Procedure Laterality Date   • BREAST SURGERY Bilateral 2014    IMPLANTS    • HEAD/NECK LESION/CYST EXCISION Bilateral 9/12/2017    Procedure: Excision of neoplasm of uncertain behavior of the upper lip with linear closure (72304);  Surgeon: Chris Ham MD;  Location: [facility];  Service:    • LIP REPAIR      OVER LAST 26 YEARS HAS HAD 4 OTHER SURGERIES ON LIP   • OTHER SURGICAL HISTORY      Lip     Family History   Problem Relation Age of Onset   • No Known Problems Mother    • No Known Problems Father      Social History     Tobacco Use   • Smoking status: Never Smoker   • Smokeless tobacco: Never Used    Substance Use Topics   • Alcohol use: No   • Drug use: No     Allergies:  Patient has no known allergies.    Current Outpatient Medications:   •  hydrOXYzine (ATARAX) 25 MG tablet, , Disp: , Rfl:   •  loratadine (CLARITIN) 10 MG tablet, Take 10 mg by mouth Daily., Disp: , Rfl:   •  ondansetron (ZOFRAN) 8 MG tablet, Take 1 tablet by mouth Every 8 (Eight) Hours As Needed for Nausea or Vomiting., Disp: 8 tablet, Rfl: 0      Objective     Vital Signs:  Temp:  [98 °F (36.7 °C)] 98 °F (36.7 °C)  Heart Rate:  [80] 80  Resp:  [20] 20  BP: (126)/(78) 126/78    Physical Exam:  Physical Exam   Constitutional: She is oriented to person, place, and time. She appears well-developed and well-nourished. She is cooperative. No distress.   HENT:   Head: Normocephalic and atraumatic.   Right Ear: External ear normal.   Left Ear: External ear normal.   Nose: Septal deviation present.   Mouth/Throat: Oropharynx is clear and moist.       Eyes: Conjunctivae and EOM are normal. Pupils are equal, round, and reactive to light. Right eye exhibits no discharge. Left eye exhibits no discharge. No scleral icterus.   Neck: Normal range of motion. Neck supple. No JVD present. No tracheal deviation present. No thyromegaly present.   Pulmonary/Chest: Effort normal. No stridor.   Musculoskeletal: Normal range of motion. She exhibits no edema or deformity.   Lymphadenopathy:     She has no cervical adenopathy.   Neurological: She is alert and oriented to person, place, and time. She has normal strength. No cranial nerve deficit. Coordination normal.   Skin: Skin is warm and dry. No rash noted. She is not diaphoretic. No erythema. No pallor.   Psychiatric: She has a normal mood and affect. Her speech is normal and behavior is normal. Judgment and thought content normal. Cognition and memory are normal.   Nursing note and vitals reviewed.      Assessment   Assessment:  1. Scar    2. Capillary hemangioma    3. Nasal valve stenosis    4. Nasal septal  deviation        Plan   Plan:    We discussed possible options for her lip.  Unfortunately, I know that there is much we can do at this point without over projecting the lip.  She is okay with this.  Down the road, she would like to discuss her nasal congestion and possible need for a septoplasty, possible need for repair of nasal vestibular stenosis versus cosmetic rhinoplasty.  She is pregnant now, so we will revisit this when she is no longer breast-feeding.    I discussed that there are many more options today with infantile hemangiomas including propranolol.  She is much reassured with this information.    No orders of the defined types were placed in this encounter.    There are no Patient Instructions on file for this visit.  No Follow-up on file.    My findings and recommendations were discussed and questions were answered.     Chris Ham MD  10/23/19  3:18 PM

## 2019-12-30 ENCOUNTER — OFFICE VISIT (OUTPATIENT)
Dept: OTOLARYNGOLOGY | Facility: CLINIC | Age: 34
End: 2019-12-30

## 2019-12-30 VITALS
RESPIRATION RATE: 20 BRPM | TEMPERATURE: 98 F | BODY MASS INDEX: 22.82 KG/M2 | SYSTOLIC BLOOD PRESSURE: 135 MMHG | WEIGHT: 124 LBS | DIASTOLIC BLOOD PRESSURE: 78 MMHG | HEIGHT: 62 IN | HEART RATE: 80 BPM

## 2019-12-30 DIAGNOSIS — I78.1 CAPILLARY HEMANGIOMA: Primary | ICD-10-CM

## 2019-12-30 PROCEDURE — 99214 OFFICE O/P EST MOD 30 MIN: CPT | Performed by: OTOLARYNGOLOGY

## 2019-12-30 RX ORDER — AMOXICILLIN 500 MG/1
500 CAPSULE ORAL 3 TIMES DAILY
Qty: 20 CAPSULE | Refills: 0 | Status: SHIPPED | OUTPATIENT
Start: 2019-12-30 | End: 2020-01-09

## 2019-12-30 NOTE — PROGRESS NOTES
PRIMARY CARE PROVIDER: Mary Urias MD  REFERRING PROVIDER: No ref. provider found    Chief Complaint   Patient presents with   • Follow-up     Upper Lip       Subjective   History of Present Illness:  Luh Tapia is a  34 y.o. female who is here for follow-up evaluation of her upper lip. 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 She is status post recontouring of the upper lip on March 1, 2019.      She had a recent dental procedure with Dr. Williamson for Invisalign on 12/3/19. During this procedure, she states she experienced pain and bleeding from her upper lip - when the lip was retracted. She reports her upper lip has intermittently and mildly  bleed since the procedure. Nothing has made this better. The pain is relatively severe and worsened palpation and with manipulation of her upper lip. She is concerned about possible recurrence of the hemangioma. She also reports associated fevers for the last 2 weeks.     Review of Systems:  Review of Systems   Constitutional: Positive for chills and fever. Negative for fatigue and unexpected weight change.   HENT: Positive for congestion and mouth sores. Negative for dental problem.    Musculoskeletal: Negative for neck pain.   Skin: Negative for wound.   Neurological: Positive for facial asymmetry.       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    Right Upper Lip   • PONV (postoperative nausea and vomiting)     EXTREME     Past Surgical History:   Procedure Laterality Date   • BREAST SURGERY Bilateral 2014    IMPLANTS    • HEAD/NECK LESION/CYST EXCISION Bilateral 9/12/2017    Procedure: Excision of neoplasm of uncertain behavior of the upper lip with linear closure (08803);  Surgeon: Chris Ham MD;  Location: [facility] OR;  Service:    • LIP REPAIR      OVER LAST 26 YEARS HAS HAD 4 OTHER SURGERIES ON LIP   • OTHER SURGICAL HISTORY      Lip     Family History    Problem Relation Age of Onset   • No Known Problems Mother    • No Known Problems Father      Social History     Tobacco Use   • Smoking status: Never Smoker   • Smokeless tobacco: Never Used   Substance Use Topics   • Alcohol use: No   • Drug use: No     Allergies:  Patient has no known allergies.    Current Outpatient Medications:   •  hydrOXYzine (ATARAX) 25 MG tablet, , Disp: , Rfl:   •  loratadine (CLARITIN) 10 MG tablet, Take 10 mg by mouth Daily., Disp: , Rfl:   •  ondansetron (ZOFRAN) 8 MG tablet, Take 1 tablet by mouth Every 8 (Eight) Hours As Needed for Nausea or Vomiting., Disp: 8 tablet, Rfl: 0    I have reviewed and edited the CC/HPI/ROS/PFSH/Allergy/Medication information entered into the note by the patient/ancillary staff to accurately document the details of this visit.    Objective     Vital Signs:  Temp:  [98 °F (36.7 °C)] 98 °F (36.7 °C)  Heart Rate:  [80] 80  Resp:  [20] 20  BP: (135)/(78) 135/78    Physical Exam:  Physical Exam   Constitutional: She is oriented to person, place, and time. She appears well-developed and well-nourished. She is cooperative. No distress.   HENT:   Head: Normocephalic and atraumatic.   Right Ear: External ear normal.   Left Ear: External ear normal.   Nose: No nasal deformity.   Mouth/Throat: Oropharynx is clear and moist.       Eyes: Pupils are equal, round, and reactive to light. Conjunctivae and EOM are normal. Right eye exhibits no discharge. Left eye exhibits no discharge. No scleral icterus.   Neck: Normal range of motion. Neck supple. No JVD present. No tracheal deviation present. No thyromegaly present.   Pulmonary/Chest: Effort normal. No stridor.   Musculoskeletal: Normal range of motion. She exhibits no edema or deformity.   Lymphadenopathy:     She has no cervical adenopathy.   Neurological: She is alert and oriented to person, place, and time. She has normal strength. No cranial nerve deficit. Coordination normal.   Skin: Skin is warm and dry. No rash  noted. She is not diaphoretic. No erythema. No pallor.   Psychiatric: She has a normal mood and affect. Her speech is normal and behavior is normal. Judgment and thought content normal. Cognition and memory are normal.   Nursing note and vitals reviewed.      Assessment   Assessment:  1. Capillary hemangioma        Plan   Plan:    I would like to start her on Amoxil due to her complaints of fevers and to see if this will aid in the healing process. I would also like to see her back in 2-3 weeks to see how this area is healing and evaluate for recurrence. If the open would persists, she may need excision of the wound and closure.  She was instructed to call or return for any problems or worsening symptoms.     No orders of the defined types were placed in this encounter.    Return in about 3 weeks (around 1/20/2020), or if symptoms worsen or fail to improve, for Recheck.    My findings and recommendations were discussed and questions were answered.     Chris Ham MD  12/30/19  2:30 PM

## 2020-02-03 LAB
ABO, EXTERNAL RESULT: NORMAL
C. TRACHOMATIS, EXTERNAL RESULT: NORMAL
HEP B, EXTERNAL RESULT: NORMAL
HEPATITIS C ANTIBODY, EXTERNAL RESULT: NORMAL
HIV, EXTERNAL RESULT: NORMAL
N. GONORRHOEAE, EXTERNAL RESULT: NORMAL
RH FACTOR, EXTERNAL RESULT: POSITIVE
RHOGAM, EXTERNAL RESULT: NEGATIVE
RPR, EXTERNAL RESULT: NORMAL
RUBELLA TITER, EXTERNAL RESULT: NORMAL

## 2020-02-21 ENCOUNTER — INITIAL PRENATAL (OUTPATIENT)
Dept: OBGYN | Age: 35
End: 2020-02-21
Payer: MEDICAID

## 2020-02-21 VITALS
BODY MASS INDEX: 22.68 KG/M2 | DIASTOLIC BLOOD PRESSURE: 67 MMHG | SYSTOLIC BLOOD PRESSURE: 101 MMHG | WEIGHT: 124 LBS | HEART RATE: 82 BPM

## 2020-02-21 DIAGNOSIS — Z3A.11 11 WEEKS GESTATION OF PREGNANCY: ICD-10-CM

## 2020-02-21 DIAGNOSIS — Z36.9 ANTENATAL SCREENING ENCOUNTER: ICD-10-CM

## 2020-02-21 PROCEDURE — 99203 OFFICE O/P NEW LOW 30 MIN: CPT | Performed by: NURSE PRACTITIONER

## 2020-02-21 PROCEDURE — 1036F TOBACCO NON-USER: CPT | Performed by: NURSE PRACTITIONER

## 2020-02-21 PROCEDURE — G8484 FLU IMMUNIZE NO ADMIN: HCPCS | Performed by: NURSE PRACTITIONER

## 2020-02-21 PROCEDURE — G8427 DOCREV CUR MEDS BY ELIG CLIN: HCPCS | Performed by: NURSE PRACTITIONER

## 2020-02-21 PROCEDURE — G8420 CALC BMI NORM PARAMETERS: HCPCS | Performed by: NURSE PRACTITIONER

## 2020-02-21 NOTE — PROGRESS NOTES
Patient presents today for initial ob visit. She is transfer from Dr Vicky Hernandez. Last pap was 02/2020  She is concerned because she took zofran for a week and has read about the side effects  Jaciel Manley is here for initial obstetrical visit. Today she is 11w3d weeks EGA. This is her first pregnancy. Labs up to date and normal. Is interested in chromosomal and carrier testing today. Current on her pap. Does want something for n/v. Plan of care was discussed with patient. Patient was encouraged to adhere to a well-balanced diet, including increasing water intake and limiting excessive caffeine and salt. The benefits of exercise were discussed; however she was advised against heavy lifting, sit-ups and abdominal crunches. A list of safe OTC medications was provided and discussed. The patient was cautioned against the use of tanning beds, hot tubs, saunas, and x-rays. Avoidance of tobacco, alcohol and illicit drugs was also discussed due to harmful effects on the fetus and increased risks associated with pregnancy. Certain labs and ultrasounds are required at certain times during pregnancy but others are optional, including the serum integrated screen/[place]/AFP/ Panorama, and other genetic testing. The patient was encouraged to attend childbirth classes and general hospital information was provided based on patients hospital of choice. She  does not have vaginal bleeding, leaking of fluid, contractions. She does not have blurred vision, SOB, or increased swelling in legs or face. Pt does not feel fetal movement regularly. O:   Vitals:    02/21/20 0936   BP: 101/67   Pulse: 82   Weight: 124 lb (56.2 kg)     Pt is A&Ox3, in no acute distress. Normocephalic, atraumatic. PERRL. Resp even and non-labored. Skin pink, warm & dry. Gravid abdomen. BIRD's well. Gait steady. See OB flowsheet. A: Normal IUP at 11w3d wks      Diagnosis Orders   1.  Nausea and vomiting in pregnancy  Doxylamine-Pyridoxine ER

## 2020-02-24 LAB
HEMOGLOBIN A-1 QUANTITATION: 95.8 % (ref 95–97.9)
HEMOGLOBIN A2 QUANTITATION: 3.3 % (ref 2–3.5)
HEMOGLOBIN C QUANTITATION: 0 % (ref 0–0)
HEMOGLOBIN E QUANTITATION: 0 % (ref 0–0)
HEMOGLOBIN ELECTROPHORESIS: NORMAL
HEMOGLOBIN EVALUATION: NORMAL
HEMOGLOBIN F QUANTITATION: 0.9 % (ref 0–2.1)
HEMOGLOBIN OTHER: 0 % (ref 0–0)
HEMOGLOBIN S QUANTITATION: 0 % (ref 0–0)
SICKLE CELL: NORMAL

## 2020-03-02 DIAGNOSIS — R50.9 FEVER AND CHILLS: ICD-10-CM

## 2020-03-02 DIAGNOSIS — R05.9 COUGH: ICD-10-CM

## 2020-03-02 DIAGNOSIS — R52 BODY ACHES: ICD-10-CM

## 2020-03-02 LAB
RAPID INFLUENZA  B AGN: NEGATIVE
RAPID INFLUENZA A AGN: NEGATIVE
S PYO AG THROAT QL: NEGATIVE

## 2020-03-04 LAB — S PYO THROAT QL CULT: NORMAL

## 2020-03-23 ENCOUNTER — ROUTINE PRENATAL (OUTPATIENT)
Dept: OBGYN | Age: 35
End: 2020-03-23
Payer: MEDICAID

## 2020-03-23 VITALS
BODY MASS INDEX: 23.59 KG/M2 | HEART RATE: 60 BPM | WEIGHT: 129 LBS | DIASTOLIC BLOOD PRESSURE: 67 MMHG | SYSTOLIC BLOOD PRESSURE: 100 MMHG

## 2020-03-23 PROCEDURE — G8484 FLU IMMUNIZE NO ADMIN: HCPCS | Performed by: OBSTETRICS & GYNECOLOGY

## 2020-03-23 PROCEDURE — 99213 OFFICE O/P EST LOW 20 MIN: CPT | Performed by: OBSTETRICS & GYNECOLOGY

## 2020-03-23 PROCEDURE — G8427 DOCREV CUR MEDS BY ELIG CLIN: HCPCS | Performed by: OBSTETRICS & GYNECOLOGY

## 2020-03-23 PROCEDURE — 1036F TOBACCO NON-USER: CPT | Performed by: OBSTETRICS & GYNECOLOGY

## 2020-03-23 PROCEDURE — G8420 CALC BMI NORM PARAMETERS: HCPCS | Performed by: OBSTETRICS & GYNECOLOGY

## 2020-03-23 RX ORDER — PROMETHAZINE HYDROCHLORIDE 12.5 MG/1
12.5 TABLET ORAL 4 TIMES DAILY PRN
Qty: 30 TABLET | Refills: 2 | Status: SHIPPED | OUTPATIENT
Start: 2020-03-23 | End: 2020-04-22

## 2020-03-23 NOTE — PROGRESS NOTES
Poncho Zacarias is a 36 y.o. female 15w6d who presents for routine prenatal visit. The patient was seen and evaluated. There was negative fetal movements. No contractions, bleeding or leakage of fluid. Signs and symptoms of  labor as well as labor were reviewed. The S/S of Pre-Eclampsia were reviewed with the patient in detail. She is to report any of these if they occur. She currently denies any of these. Pt c/o nausea, no vomiting. Pt tried Zofran, did not like it.   Poncho Zacarias is a 36 y.o. female with the following history as [facility] in [facility]:  Patient Active Problem List    Diagnosis Date Noted    External hemorrhoids 2015     Current Outpatient Medications   Medication Sig Dispense Refill    promethazine (PHENERGAN) 12.5 MG tablet Take 1 tablet by mouth 4 times daily as needed for Nausea 30 tablet 2    Prenatal MV-Min-Fe Fum-FA-DHA (PRENATAL 1 PO) Take by mouth       No current facility-administered medications for this visit. Allergies: Patient has no known allergies. Past Medical History:   Diagnosis Date    Hemorrhoids      Past Surgical History:   Procedure Laterality Date    BREAST SURGERY      implants    LIP REPAIR       Family History   Problem Relation Age of Onset    Colon Cancer Neg Hx     Colon Polyps Neg Hx     Esophageal Cancer Neg Hx     Liver Cancer Neg Hx     Liver Disease Neg Hx     Rectal Cancer Neg Hx     Stomach Cancer Neg Hx      Social History     Tobacco Use    Smoking status: Never Smoker    Smokeless tobacco: Never Used   Substance Use Topics    Alcohol use: No         Mother's Prenatal Vitals  BP: 100/67  Weight: 129 lb (58.5 kg)  Pulse: 60  Patient Position: Sitting  Prenatal Fetal Information  Fetal Heart Rate: US-153  Movement: Absent  NAD, A&O x3  NCAT  EOMI  Abd soft, Gravid, NT  Ext no edema, NT, FROM  Normal mood, affect and thought content      Assessment:   Diagnosis Orders   1.  Encounter for supervision of normal first pregnancy in second trimester  External Referral To Maternal Fetal Medicine   2. Nausea and vomiting during pregnancy  promethazine (PHENERGAN) 12.5 MG tablet   3. 15 weeks gestation of pregnancy               Plan:  1. SAB precautions   2. Return in 4 weeks  3. Phenergan 12.5 mg q 8 hrs prn.   4. MFM appt made for 4-28-20 at 9:15am  I Mariya Jones, am scribing for and in the presence of Dr. Orie Schaumann. I, Dr. Orie Schaumann, personally performed the services described in this documentation as scribed by Mariya Jones in my presence, and it is both accurate and complete.

## 2020-03-23 NOTE — PROGRESS NOTES
Pt is here for prenatal appointment. She denies spotting, cramping, contractions. She has been sick to her stomach. She would like something that her insurance would cover.

## 2020-03-23 NOTE — PATIENT INSTRUCTIONS

## 2020-03-23 NOTE — LETTER
[facility] Referral Form    Appointment with: [] Dr. Cora Henderson MD  [] SHAYNE Burr    Patient Name: Abdiaziz Jarquin   DOD: 1985   Physician Requesting Service: Dr. Onel Owens  Date Requested: 3/23/20  Physician Phone: [phone] Fax: [phone]  Patient Address: [place] [address]  Patient Phone: [phone] (home)  [address] Number:     Patient Insurance Information: Please see attached    LMP: Patient's last menstrual period was 11/15/2019. EDC: 2020, by Ultrasound   Gestational Age Today: 14w8d        Blood Type: A Positive  Clinical indication for services requested. Please include ICD-10 code and/or description:anatomy scan  The encounter diagnosis was Encounter for supervision of normal first pregnancy in second trimester. History:  has a past medical history of Hemorrhoids. Is this patient Diabetic: No Is this patient AMA: No    [] Consult  []Biophysical Profile   [] Gestational Diabetes  [x] Ultrasound  [] Genetic Counseling   [] Type 1 DM and Pregnant  [] Multiple Gestation [] Amnio (Lung Maturity or Genetic) [] Type 2 DM and Pregnant  [] Non-stress testing [] Diabetes Consult/ Education  [] Preconception Counseling  [] Nuchal translucency (1st trimester screening) APPT MUST BE AT 11 wk to 13wk6d)  [] Prenatal Testing  [] Other procedures, please specify:    **Consults NOT requested may be performed at the discretion of the MFM**    PLEASE FAX ALL RENATAL RECORDS, LAB RESULTS AND RECORDS REGARDING CLINICAL INDICATION WITH EVERY REFERRAL (including routine ultrasounds)       Form completed by:  Adwoa Wray Date: 3/23/20  Patients Scheduled Appointment:      @         am/ pm  Patient Notified: [] Yes  [] No

## 2020-03-26 ENCOUNTER — PATIENT MESSAGE (OUTPATIENT)
Dept: OBGYN | Age: 35
End: 2020-03-26

## 2020-03-26 RX ORDER — VITAMIN A ACETATE, BETA CAROTENE, ASCORBIC ACID, CHOLECALCIFEROL, .ALPHA.-TOCOPHEROL ACETATE, DL-, THIAMINE MONONITRATE, RIBOFLAVIN, NIACINAMIDE, PYRIDOXINE HYDROCHLORIDE, FOLIC ACID, CYANOCOBALAMIN, CALCIUM CARBONATE, FERROUS FUMARATE, ZINC OXIDE, CUPRIC OXIDE 3080; 12; 120; 400; 1; 1.84; 3; 20; 22; 920; 25; 200; 27; 10; 2 [IU]/1; UG/1; MG/1; [IU]/1; MG/1; MG/1; MG/1; MG/1; MG/1; [IU]/1; MG/1; MG/1; MG/1; MG/1; MG/1
1 TABLET, FILM COATED ORAL DAILY
Qty: 30 TABLET | Refills: 11 | Status: SHIPPED | OUTPATIENT
Start: 2020-03-26 | End: 2020-06-15

## 2020-05-18 ENCOUNTER — ROUTINE PRENATAL (OUTPATIENT)
Dept: OBGYN | Age: 35
End: 2020-05-18

## 2020-05-18 VITALS
SYSTOLIC BLOOD PRESSURE: 102 MMHG | WEIGHT: 142 LBS | DIASTOLIC BLOOD PRESSURE: 67 MMHG | BODY MASS INDEX: 25.97 KG/M2 | HEART RATE: 68 BPM

## 2020-05-18 PROCEDURE — 99213 OFFICE O/P EST LOW 20 MIN: CPT | Performed by: OBSTETRICS & GYNECOLOGY

## 2020-05-18 NOTE — PATIENT INSTRUCTIONS
more sessions each day. Ease or reduce swelling in your feet, ankles, hands, and fingers  · If your fingers are puffy, take off your rings. · Do not eat high-salt foods, such as potato chips. · Prop up your feet on a stool or couch as much as possible. Sleep with pillows under your feet. · Do not stand for long periods of time or wear tight shoes. · Wear support stockings. Where can you learn more? Go to https://iFit.Conisus. org and sign in to your PlexPress account. Enter G264 in the Protonet box to learn more about \"Weeks 22 to 26 of Your Pregnancy: Care Instructions. \"     If you do not have an account, please click on the \"Sign Up Now\" link. Current as of: May 29, 2019Content Version: 12.4  © 6720-1598 Healthwise, Incorporated. Care instructions adapted under license by [facility] ([facility]). If you have questions about a medical condition or this instruction, always ask your healthcare professional. [place] [address] any warranty or liability for your use of this information. [address] [place] [address] OB/GYN   One Hour Glucose Test Instructions    The 1 Hour Glucose test is designed to screen for gestational diabetes. This screening test is usually performed between 26-29 weeks of gestation. Gestational diabetes results in higher than normal blood sugar levels and can lead to pregnancy complications if not diagnosed and treated. For this reason, we recommend that all women undergo screening.  - You may eat or drink a normal breakfast or lunch prior to the test, but please avoid anything that contains excessive sugar. For example, do not eat sugary cereals, candy or drink soda or fruit juice.  - You will be given this drink at the Outpatient Lab (#[id number]) located on the 1st floor of [address] [place] [address] the entire bottle of the glucose drink, within 10 minutes and note the time that you finish the drink.  Also, inform the  of the time that

## 2020-05-18 NOTE — PROGRESS NOTES
Pt is here for prenatal appointment. She denies spotting, contractions. She is having a lot cramping and a lot of sharp pain around her left shoulder. She has started having lower back pain as well.
not in acute distress. Appearance: Normal appearance. She is not ill-appearing, toxic-appearing or diaphoretic. HENT:      Head: Normocephalic and atraumatic. Eyes:      Extraocular Movements: Extraocular movements intact. Pulmonary:      Effort: Pulmonary effort is normal. No respiratory distress. Abdominal:      Palpations: Abdomen is soft. Tenderness: There is no abdominal tenderness. Musculoskeletal: Normal range of motion. General: No swelling or tenderness. Right lower leg: No edema. Left lower leg: No edema. Skin:     General: Skin is warm and dry. Findings: No rash. Neurological:      Mental Status: She is alert and oriented to person, place, and time. Psychiatric:         Attention and Perception: Attention and perception normal.         Mood and Affect: Mood and affect normal.         Speech: Speech normal.         Behavior: Behavior normal. Behavior is cooperative. Thought Content: Thought content normal.         Cognition and Memory: Cognition and memory normal.         Judgment: Judgment normal.           The patient had her 28 week labs ordered. T-Dap Vaccine (27-36 weeks): awaiting    Assessment:   Diagnosis Orders   1. Encounter for supervision of normal first pregnancy in second trimester  Glucose 1 Hour Postprandial    CBC Auto Differential   2. Ventricular septal defect (VSD) of fetus in davies pregnancy, antepartum     3. 23 weeks gestation of pregnancy               Plan:  1. PTL precautions   2. Return in 4 weeks  3. Pt has another appt with MFM pt concerned about VSD of heart. Diagnosis explained. 4. Glucose 1 hr and cbc next visit. Pam Finley, am scribing for and in the presence of Dr. Robinson Rangel. I, Dr. Robinson Rangel, personally performed the services described in this documentation as scribed by Elsa Rubin in my presence, and it is both accurate and complete.

## 2020-05-28 ENCOUNTER — PATIENT MESSAGE (OUTPATIENT)
Dept: OBGYN | Age: 35
End: 2020-05-28

## 2020-05-28 RX ORDER — PRENATAL 71/IRON/FOLIC AC/DHA 30-1.4-2
1 CAPSULE,IMMEDIATE, DELAY RELEASE,BIPHASE ORAL DAILY
Qty: 30 CAPSULE | Refills: 5 | Status: SHIPPED | OUTPATIENT
Start: 2020-05-28 | End: 2020-07-13 | Stop reason: SDUPTHER

## 2020-05-28 NOTE — TELEPHONE ENCOUNTER
From: Richardson Flynn  To: Jacki Garcia MD  Sent: 5/28/2020 8:07 AM CDT  Subject: Prescription Question    Good morning,     Can you please sent me this prenatal vitamins to the pharmacy at [place] in [facility]? Iwe try the samples I got last time at your office and this is the ones I like the most. Also another question related to my next appointment, do I need to go to the lab and then go to your office? I forgot to ask you last time. Thank you.

## 2020-06-15 ENCOUNTER — ROUTINE PRENATAL (OUTPATIENT)
Dept: OBGYN | Age: 35
End: 2020-06-15

## 2020-06-15 VITALS
DIASTOLIC BLOOD PRESSURE: 67 MMHG | BODY MASS INDEX: 26.89 KG/M2 | SYSTOLIC BLOOD PRESSURE: 99 MMHG | HEART RATE: 76 BPM | WEIGHT: 147 LBS

## 2020-06-15 DIAGNOSIS — Z34.02 ENCOUNTER FOR SUPERVISION OF NORMAL FIRST PREGNANCY IN SECOND TRIMESTER: ICD-10-CM

## 2020-06-15 LAB
BASOPHILS ABSOLUTE: 0 K/UL (ref 0–0.2)
BASOPHILS RELATIVE PERCENT: 0.4 % (ref 0–1)
EOSINOPHILS ABSOLUTE: 0.1 K/UL (ref 0–0.6)
EOSINOPHILS RELATIVE PERCENT: 0.6 % (ref 0–5)
GLUCOSE, 1HR PP: 102 MG/DL (ref 75–140)
HCT VFR BLD CALC: 36.1 % (ref 37–47)
HEMOGLOBIN: 12.4 G/DL (ref 12–16)
IMMATURE GRANULOCYTES #: 0.1 K/UL
LYMPHOCYTES ABSOLUTE: 1.5 K/UL (ref 1.1–4.5)
LYMPHOCYTES RELATIVE PERCENT: 15.6 % (ref 20–40)
MCH RBC QN AUTO: 32.7 PG (ref 27–31)
MCHC RBC AUTO-ENTMCNC: 34.3 G/DL (ref 33–37)
MCV RBC AUTO: 95.3 FL (ref 81–99)
MONOCYTES ABSOLUTE: 0.7 K/UL (ref 0–0.9)
MONOCYTES RELATIVE PERCENT: 7.3 % (ref 0–10)
NEUTROPHILS ABSOLUTE: 7.2 K/UL (ref 1.5–7.5)
NEUTROPHILS RELATIVE PERCENT: 75.2 % (ref 50–65)
PDW BLD-RTO: 12.7 % (ref 11.5–14.5)
PLATELET # BLD: 199 K/UL (ref 130–400)
PMV BLD AUTO: 10.7 FL (ref 9.4–12.3)
RBC # BLD: 3.79 M/UL (ref 4.2–5.4)
WBC # BLD: 9.6 K/UL (ref 4.8–10.8)

## 2020-06-15 PROCEDURE — 99213 OFFICE O/P EST LOW 20 MIN: CPT | Performed by: OBSTETRICS & GYNECOLOGY

## 2020-06-15 NOTE — PATIENT INSTRUCTIONS
sometimes follow orgasm. Learn about  labor  · Watch for signs of  labor. You may be going into labor if:  ? You have menstrual-like cramps, with or without nausea. ? You have about 6 or more contractions in 1 hour, even after you have had a glass of water and are resting. ? You have a low, dull backache that does not go away when you change your position. ? You have pain or pressure in your pelvis that comes and goes in a pattern. ? You have intestinal cramping or flu-like symptoms, with or without diarrhea.  ? You notice an increase or change in your vaginal discharge. Discharge may be heavy, mucus-like, watery, or streaked with blood. ? Your water breaks. · If you think you have  labor:  ? Drink 2 or 3 glasses of water or juice. Not drinking enough fluids can cause contractions. ? Stop what you are doing, and empty your bladder. Then lie down on your left side for at least 1 hour. ? While lying on your side, find your breast bone. Put your fingers in the soft spot just below it. Move your fingers down toward your belly button to find the top of your uterus. Check to see if it is tight. ? Contractions can be weak or strong. Record your contractions for an hour. Time a contraction from the start of one contraction to the start of the next one.  ? Single or several strong contractions without a pattern are called Kings-Antunez contractions. They are practice contractions but not the start of labor. They often stop if you change what you are doing. ? Call your doctor if you have regular contractions. Where can you learn more? Go to https://SharesVaultgoodEcorithm.Tacit Innovations. org and sign in to your "Om[employer]" account. Enter N543 in the fabrik box to learn more about \"Weeks 26 to 30 of Your Pregnancy: Care Instructions. \"     If you do not have an account, please click on the \"Sign Up Now\" link.   Current as of: 2020               Content Version: 12.5  © 3769-7336 Healthwise, Incorporated. Care instructions adapted under license by [facility] ([facility]). If you have questions about a medical condition or this instruction, always ask your healthcare professional. [address] any warranty or liability for your use of this information.

## 2020-06-15 NOTE — PROGRESS NOTES
Kev Lam is a 36 y.o. female 27w6d who presents for routine prenatal visit. The patient was seen and evaluated. There was positive fetal movements. No contractions, bleeding or leakage of fluid. Signs and symptoms of  labor as well as labor were reviewed. The S/S of Pre-Eclampsia were reviewed with the patient in detail. She is to report any of these if they occur. She currently denies any of these.   Kev Lam is a 36 y.o. female with the following history as recorded in [facility]:  Patient Active Problem List    Diagnosis Date Noted    External hemorrhoids 2015     Current Outpatient Medications   Medication Sig Dispense Refill    Xzoqud-RzJjd-Saitd-FA-DHA w/oA (VITAPEARL) 30-1.4-200 MG CPCR Take 1 tablet by mouth daily 30 capsule 5     No current facility-administered medications for this visit. Allergies: Patient has no known allergies. Past Medical History:   Diagnosis Date    Hemorrhoids      Past Surgical History:   Procedure Laterality Date    BREAST SURGERY      implants    LIP REPAIR       Family History   Problem Relation Age of Onset    Colon Cancer Neg Hx     Colon Polyps Neg Hx     Esophageal Cancer Neg Hx     Liver Cancer Neg Hx     Liver Disease Neg Hx     Rectal Cancer Neg Hx     Stomach Cancer Neg Hx      Social History     Tobacco Use    Smoking status: Never Smoker    Smokeless tobacco: Never Used   Substance Use Topics    Alcohol use: No         Mother's Prenatal Vitals  BP: 99/67  Weight: 147 lb (66.7 kg)  Pulse: 76  Patient Position: Sitting  Prenatal Fetal Information  Fundal Height (cm): 27 cm  Fetal Heart Rate: US-143  Movement: Present  Physical Exam  Constitutional:       General: She is not in acute distress. Appearance: Normal appearance. She is not ill-appearing, toxic-appearing or diaphoretic. HENT:      Head: Normocephalic and atraumatic. Eyes:      Extraocular Movements: Extraocular movements intact.    Pulmonary: Effort: Pulmonary effort is normal. No respiratory distress. Abdominal:      Palpations: Abdomen is soft. Tenderness: There is no abdominal tenderness. Musculoskeletal: Normal range of motion. General: No swelling or tenderness. Right lower leg: No edema. Left lower leg: No edema. Skin:     General: Skin is warm and dry. Findings: No rash. Neurological:      Mental Status: She is alert and oriented to person, place, and time. Psychiatric:         Attention and Perception: Attention and perception normal.         Mood and Affect: Mood and affect normal.         Speech: Speech normal.         Behavior: Behavior normal. Behavior is cooperative. Thought Content: Thought content normal.         Cognition and Memory: Cognition and memory normal.         Judgment: Judgment normal.           The patient had her 28 week labs in process. T-Dap Vaccine (27-36 weeks): completed    The patient was instructed on fetal kick counts and was given a kick sheet to complete every 8 hours. She was instructed that the baby should move at a minimum of ten times within one hour after a meal. The patient was instructed to lay down on her left side twenty minutes after eating and count movements for up to one hour with a target value of ten movements. She was instructed to notify the office if she did not make that target after two attempts or if after any attempt there was less than four movements. The patient reports that the targets have been made Yes. Assessment:   Diagnosis Orders   1. 27 weeks gestation of pregnancy     2. Need for Tdap vaccination               Plan:  1. PTL precautions   2. Return in 2 weeks  3.  Glucose 1 hr and cbc today, results normal.

## 2020-06-29 ENCOUNTER — TELEMEDICINE (OUTPATIENT)
Dept: OBGYN | Age: 35
End: 2020-06-29

## 2020-06-29 PROCEDURE — 99213 OFFICE O/P EST LOW 20 MIN: CPT | Performed by: OBSTETRICS & GYNECOLOGY

## 2020-06-29 NOTE — PROGRESS NOTES
2020    TELEHEALTH EVALUATION -- Audio/Visual (During IUHTX-15 public health emergency)   Elvis Juarez is a 36 y.o. female 29w6d who presents for routine prenatal visit. The patient was seen and evaluated. There was normal fetal movements. No contractions, bleeding or leakage of fluid. Signs and symptoms of  labor as well as labor were reviewed. The S/S of Pre-Eclampsia were reviewed with the patient in detail. She is to report any of these if they occur. She currently denies any of these.   Elvis Juarez is a 36 y.o. female with the [facility] [place]:  Patient Active Problem List    Diagnosis Date Noted    External hemorrhoids 2015     Current Outpatient Medications   Medication Sig Dispense Refill    Eilxzn-BxMnd-Hduqu-FA-DHA w/oA (VITAPEARL) 30-1.4-200 MG CPCR Take 1 tablet by mouth daily 30 capsule 5     No current facility-administered medications for this visit. Allergies: Patient has no known allergies. Past Medical History:   Diagnosis Date    Hemorrhoids      Past Surgical History:   Procedure Laterality Date    BREAST SURGERY      implants    LIP REPAIR       Family History   Problem Relation Age of Onset    Colon Cancer Neg Hx     Colon Polyps Neg Hx     Esophageal Cancer Neg Hx     Liver Cancer Neg Hx     Liver Disease Neg Hx     Rectal Cancer Neg Hx     Stomach Cancer Neg Hx      Social History     Tobacco Use    Smoking status: Never Smoker    Smokeless tobacco: Never Used   Substance Use Topics    Alcohol use: No            Physical Exam      The patient had her 28 week labs completed. T-Dap Vaccine (27-36 weeks): completed    The patient was instructed on fetal kick counts and was given a kick sheet to complete every 8 hours.  She was instructed that the baby should move at a minimum of ten times within one hour after a meal. The patient was instructed to lay down on her left side twenty minutes after eating and count movements for up to one hour with a target value of ten movements. She was instructed to notify the office if she did not make that target after two attempts or if after any attempt there was less than four movements. The patient reports that the targets have been made Yes. Assessment:   Diagnosis Orders   1. Encounter for supervision of normal first pregnancy, third trimester     2. 29 weeks gestation of pregnancy               Plan:  1. PTL precautions   2. All questions answered  3. Return in 2 weeks                        Milo Arora is a 36 y.o. female being evaluated by a Virtual Visit (video visit) encounter to address concerns as mentioned above. A caregiver was present when appropriate. Due to this being a TeleHealth encounter (During ZDRXV-93 public health emergency), evaluation of the following organ systems was limited: Vitals/Constitutional/EENT/Resp/CV/GI//MS/Neuro/Skin/Heme-Lymph-Imm. Pursuant to the emergency declaration under the [address] [place] [address] and the [employer] Act, this Virtual Visit was conducted with patient's (and/or legal guardian's) consent, to reduce the patient's risk of exposure to COVID-19 and provide necessary medical care. The patient (and/or legal guardian) has also been advised to contact this office for worsening conditions or problems, and seek emergency medical treatment and/or call 911 if deemed necessary. Services were provided through a video synchronous discussion virtually to substitute for in-person clinic visit. Patient and provider were located at their individual homes. --Peter Clayton MD on 7/9/2020 at 5:42 PM    An electronic signature was used to authenticate this note.

## 2020-07-13 ENCOUNTER — TELEMEDICINE (OUTPATIENT)
Dept: OBGYN | Age: 35
End: 2020-07-13

## 2020-07-13 PROCEDURE — 99213 OFFICE O/P EST LOW 20 MIN: CPT | Performed by: OBSTETRICS & GYNECOLOGY

## 2020-07-13 RX ORDER — PRENATAL 71/IRON/FOLIC AC/DHA 30-1.4-2
1 CAPSULE,IMMEDIATE, DELAY RELEASE,BIPHASE ORAL DAILY
Qty: 30 CAPSULE | Refills: 5 | Status: SHIPPED | OUTPATIENT
Start: 2020-07-13 | End: 2020-12-28 | Stop reason: SDUPTHER

## 2020-07-13 NOTE — PROGRESS NOTES
Extraocular Movements: Extraocular movements intact. Pulmonary:      Effort: Pulmonary effort is normal. No respiratory distress. Abdominal:      Palpations: Abdomen is soft. Tenderness: There is no abdominal tenderness. Musculoskeletal: Normal range of motion. Skin:     Findings: No rash. Neurological:      Mental Status: She is alert and oriented to person, place, and time. Psychiatric:         Attention and Perception: Attention and perception normal.         Mood and Affect: Mood and affect normal.         Speech: Speech normal.         Behavior: Behavior normal. Behavior is cooperative. Thought Content: Thought content normal.         Cognition and Memory: Cognition and memory normal.         Judgment: Judgment normal.           The patient had her 28 week labs completed. T-Dap Vaccine (27-36 weeks): completed    The patient was instructed on fetal kick counts and was given a kick sheet to complete every 8 hours. She was instructed that the baby should move at a minimum of ten times within one hour after a meal. The patient was instructed to lay down on her left side twenty minutes after eating and count movements for up to one hour with a target value of ten movements. She was instructed to notify the office if she did not make that target after two attempts or if after any attempt there was less than four movements. The patient reports that the targets have been made Yes. Assessment:   Diagnosis Orders   1. Encounter for supervision of normal first pregnancy, third trimester     2. 31 weeks gestation of pregnancy               Plan:  1. PTL precautions   2. Return in 2 weeks  3. Assured pt that there is time for the baby to change position. Duke Marshall, am scribing for and in the presence of Dr. Gaye Jimenez.   I, Dr. Gaye Jimenez, personally performed the services described in this documentation as scribed by Flako Bills in my presence, and it is both accurate

## 2020-07-29 ENCOUNTER — TELEMEDICINE (OUTPATIENT)
Dept: OBGYN | Age: 35
End: 2020-07-29

## 2020-07-29 PROCEDURE — 99213 OFFICE O/P EST LOW 20 MIN: CPT | Performed by: OBSTETRICS & GYNECOLOGY

## 2020-07-29 NOTE — PROGRESS NOTES
Extraocular movements intact. Pulmonary:      Effort: Pulmonary effort is normal. No respiratory distress. Musculoskeletal: Normal range of motion. Skin:     Findings: No rash. Neurological:      Mental Status: She is alert and oriented to person, place, and time. Psychiatric:         Attention and Perception: Attention and perception normal.         Mood and Affect: Mood and affect normal.         Speech: Speech normal.         Behavior: Behavior normal. Behavior is cooperative. Thought Content: Thought content normal.         Cognition and Memory: Cognition and memory normal.         Judgment: Judgment normal.           The patient had her 28 week labs completed. T-Dap Vaccine (27-36 weeks): completed    The patient was instructed on fetal kick counts and was given a kick sheet to complete every 8 hours. She was instructed that the baby should move at a minimum of ten times within one hour after a meal. The patient was instructed to lay down on her left side twenty minutes after eating and count movements for up to one hour with a target value of ten movements. She was instructed to notify the office if she did not make that target after two attempts or if after any attempt there was less than four movements. The patient reports that the targets have been made Yes. Assessment:   Diagnosis Orders   1. Encounter for supervision of normal first pregnancy, third trimester     2. 34 weeks gestation of pregnancy               Plan:  1. PTL precautions   2. Return in 2 weeks in office  3. Recommend pregnancy belt, warm bath, Tylenol prn for sciatic pain. Birttany Leon, charles scribing for and in the presence of Dr. To Huitron. I, Dr. To Huitron, personally performed the services described in this documentation as scribed by Davida Franco in my presence, and it is both accurate and complete.                            Jeannie Welch is a 36 y.o. female being evaluated by a Virtual Visit (video visit) encounter to address concerns as mentioned above. A caregiver was present when appropriate. Due to this being a TeleHealth encounter ([facility] emergency), evaluation of the following organ systems was limited: Vitals/Constitutional/EENT/Resp/CV/GI//MS/Neuro/Skin/Heme-Lymph-Imm. Pursuant to the emergency declaration under the [address] [place] [address] and the [employer] Act, this Virtual Visit was conducted with patient's (and/or legal guardian's) consent, to reduce the patient's risk of exposure to COVID-19 and provide necessary medical care. The patient (and/or legal guardian) has also been advised to contact this office for worsening conditions or problems, and seek emergency medical treatment and/or call 911 if deemed necessary. Services were provided through a video synchronous discussion virtually to substitute for in-person clinic visit. Patient and provider were located at their individual homes. --Mirella Jain MD on 7/29/2020 at 3:17 PM    An electronic signature was used to authenticate this note.

## 2020-08-07 ENCOUNTER — TELEPHONE (OUTPATIENT)
Dept: OBGYN | Age: 35
End: 2020-08-07

## 2020-08-12 ENCOUNTER — ROUTINE PRENATAL (OUTPATIENT)
Dept: OBGYN | Age: 35
End: 2020-08-12

## 2020-08-12 VITALS
HEART RATE: 76 BPM | BODY MASS INDEX: 28.9 KG/M2 | WEIGHT: 158 LBS | DIASTOLIC BLOOD PRESSURE: 68 MMHG | SYSTOLIC BLOOD PRESSURE: 106 MMHG

## 2020-08-12 PROCEDURE — 99213 OFFICE O/P EST LOW 20 MIN: CPT | Performed by: OBSTETRICS & GYNECOLOGY

## 2020-08-12 NOTE — PROGRESS NOTES
Pt is here for prenatal appointment. She denies spotting. Pt states that her ribs have been very sore. Pt states she has been having a lot of cramping. She has been having holly gipson. She has been having lower back pain, she states last night she was having pain in her buttocks.
Plan: 1. PTL precautions   2. Return in 1 weeks  3. GBS today  I Epi Cummings, am scribing for and in the presence of Dr. Dulce Magaña. I, Dr. Dulce Magaña, personally performed the services described in this documentation as scribed by Epi Cummings in my presence, and it is both accurate and complete.

## 2020-08-12 NOTE — PATIENT INSTRUCTIONS
Patient Education        Week 37 of Your Pregnancy: Care Instructions  Your Care Instructions     You are near the end of your pregnancy--and you're probably pretty uncomfortable. It may be harder to walk around. Lying down probably isn't comfortable either. You may have trouble getting to sleep or staying asleep. Most women deliver their babies between 40 and 41 weeks. This is a good time to think about packing a bag for the hospital with items you'll need. Then you'll be ready when labor starts. Follow-up care is a key part of your treatment and safety. Be sure to make and go to all appointments, and call your doctor if you are having problems. It's also a good idea to know your test results and keep a list of the medicines you take. How can you care for yourself at home? Learn about breastfeeding  · Breastfeeding is best for your baby and good for you. · Breast milk has antibodies to help your baby fight infections. · Mothers who breastfeed often lose weight faster, because making milk burns calories. · Learning the best ways to hold your baby will make breastfeeding easier. · Let your partner bathe and diaper the baby to keep your partner from feeling left out. Snuggle together when you breastfeed. · You may want to learn how to use a breast pump and store your milk. · If you choose to bottle feed, make the feeding feel like breastfeeding so you can bond with your baby. Always hold your baby and the bottle. Do not prop bottles or let your baby fall asleep with a bottle. Learn about crying  · It is common for babies to cry for 1 to 3 hours a day. Some cry more, some cry less. · Babies don't cry to make you upset or because you are a bad parent. · Crying is how your baby communicates. Your baby may be hungry; have gas; need a diaper change; or feel cold, warm, tired, lonely, or tense. Sometimes babies cry for unknown reasons.   · If you respond to your baby's needs, he or she will learn to trust you.  · Try to stay calm when your baby cries. Your baby may get more upset if he or she senses that you are upset. Know how to care for your   · Your baby's umbilical cord stump will drop off on its own, usually between 1 and 2 weeks. To care for your baby's umbilical cord area:  ? Clean the area at the bottom of the cord 2 or 3 times a day. ? Pay special attention to the area where the cord attaches to the skin. ? Keep the diaper folded below the cord. ? Use a damp washcloth or cotton ball to sponge bathe your baby until the stump has come off. · Your baby's first dark stool is called meconium. After the meconium is passed, your baby will develop his or her own bowel pattern. ? Some babies, especially  babies, have several bowel movements a day. Others have one or two a day, or one every 2 to 3 days. ?  babies often have loose, yellow stools. Formula-fed babies have more formed stools. ? If your baby's stools look like little pellets, he or she is constipated. After 2 days of constipation, call your baby's doctor. · If your baby will be circumcised, you can care for him at home. ? Gently rinse his penis with warm water after every diaper change. Do not try to remove the film that forms on the penis. This film will go away on its own. Pat dry. ? Put petroleum ointment, such as Vaseline, on the area of the diaper that will touch your baby's penis. This will keep the diaper from sticking to your baby. ? Ask the doctor about giving your baby acetaminophen (Tylenol) for pain. Where can you learn more? Go to https://chgoodeb.[employer]. org and sign in to your [employer] account. Enter 68 21 [id number] in the [employer] box to learn more about \"Week 37 of Your Pregnancy: Care Instructions. \"     If you do not have an account, please click on the \"Sign Up Now\" link. Current as of: 2020               Content Version: 12.5  © 4970-5989 Healthwise, Incorporated. Care instructions adapted under license by [facility] ([facility]). If you have questions about a medical condition or this instruction, always ask your healthcare professional. [address] any warranty or liability for your use of this information.

## 2020-08-15 LAB — GROUP B STREP CULTURE: NORMAL

## 2020-08-19 ENCOUNTER — ROUTINE PRENATAL (OUTPATIENT)
Dept: OBGYN | Age: 35
End: 2020-08-19

## 2020-08-19 VITALS
BODY MASS INDEX: 29.08 KG/M2 | DIASTOLIC BLOOD PRESSURE: 64 MMHG | SYSTOLIC BLOOD PRESSURE: 98 MMHG | WEIGHT: 159 LBS | HEART RATE: 80 BPM

## 2020-08-19 DIAGNOSIS — Z34.03 ENCOUNTER FOR SUPERVISION OF NORMAL FIRST PREGNANCY IN THIRD TRIMESTER: ICD-10-CM

## 2020-08-19 LAB
BASOPHILS ABSOLUTE: 0 K/UL (ref 0–0.2)
BASOPHILS RELATIVE PERCENT: 0.2 % (ref 0–1)
EOSINOPHILS ABSOLUTE: 0.1 K/UL (ref 0–0.6)
EOSINOPHILS RELATIVE PERCENT: 0.5 % (ref 0–5)
HCT VFR BLD CALC: 38.5 % (ref 37–47)
HEMOGLOBIN: 13.2 G/DL (ref 12–16)
HIV-1 P24 AG: NORMAL
IMMATURE GRANULOCYTES #: 0.1 K/UL
LYMPHOCYTES ABSOLUTE: 1.5 K/UL (ref 1.1–4.5)
LYMPHOCYTES RELATIVE PERCENT: 15.1 % (ref 20–40)
MCH RBC QN AUTO: 32.1 PG (ref 27–31)
MCHC RBC AUTO-ENTMCNC: 34.3 G/DL (ref 33–37)
MCV RBC AUTO: 93.7 FL (ref 81–99)
MONOCYTES ABSOLUTE: 0.8 K/UL (ref 0–0.9)
MONOCYTES RELATIVE PERCENT: 8 % (ref 0–10)
NEUTROPHILS ABSOLUTE: 7.5 K/UL (ref 1.5–7.5)
NEUTROPHILS RELATIVE PERCENT: 75.2 % (ref 50–65)
PDW BLD-RTO: 12.6 % (ref 11.5–14.5)
PLATELET # BLD: 207 K/UL (ref 130–400)
PMV BLD AUTO: 10.9 FL (ref 9.4–12.3)
RAPID HIV 1&2: NORMAL
RBC # BLD: 4.11 M/UL (ref 4.2–5.4)
WBC # BLD: 10 K/UL (ref 4.8–10.8)

## 2020-08-19 PROCEDURE — 99213 OFFICE O/P EST LOW 20 MIN: CPT | Performed by: OBSTETRICS & GYNECOLOGY

## 2020-08-19 NOTE — PROGRESS NOTES
Pt is here for prenatal appointment. She denies spotting,contractions. She is having a lot of cramping and lower pelvic pressure. She says her cervix is killing her. She states yesterday was a rough day for her. Carlos Elliott is a 36 y.o. female 37w1d who presents for routine prenatal visit. The patient was seen and evaluated. There was positive fetal movements. No contractions, bleeding or leakage of fluid. Signs and symptoms of  labor as well as labor were reviewed. The S/S of Pre-Eclampsia were reviewed with the patient in detail. She is to report any of these if they occur. She currently denies any of these.   Carlos Elliott is a 36 y.o. female with the [facility]:  Patient Active Problem List    Diagnosis Date Noted    External hemorrhoids 2015     Current Outpatient Medications   Medication Sig Dispense Refill    Fqusyz-UnXsa-Kphtq-FA-DHA w/oA (VITAPEARL) 30-1.4-200 MG CPCR Take 1 tablet by mouth daily 30 capsule 5    hydrocortisone (ANUSOL-HC) 25 MG suppository Place 1 suppository rectally every 12 hours 12 suppository 0     No current facility-administered medications for this visit. Allergies: Patient has no known allergies.   Past Medical History:   Diagnosis Date    Hemorrhoids      Past Surgical History:   Procedure Laterality Date    BREAST SURGERY      implants    LIP REPAIR       Family History   Problem Relation Age of Onset    Colon Cancer Neg Hx     Colon Polyps Neg Hx     Esophageal Cancer Neg Hx     Liver Cancer Neg Hx     Liver Disease Neg Hx     Rectal Cancer Neg Hx     Stomach Cancer Neg Hx      Social History     Tobacco Use    Smoking status: Never Smoker    Smokeless tobacco: Never Used   Substance Use Topics    Alcohol use: No         Mother's Prenatal Vitals  BP: 98/64  Weight: 159 lb (72.1 kg)  Pulse: 80  Patient Position: Sitting  Prenatal Fetal Information  Fetal Heart Rate: US-147  Movement: Present  Presentation: Vertex  Dil/Eff/Sta  Dilation (cm): Closed  Effacement (%): 60  Station: -3  Physical Exam      The patient had her 28 week labs completed. T-Dap Vaccine (27-36 weeks): completed    The patient was instructed on fetal kick counts and was given a kick sheet to complete every 8 hours. She was instructed that the baby should move at a minimum of ten times within one hour after a meal. The patient was instructed to lay down on her left side twenty minutes after eating and count movements for up to one hour with a target value of ten movements. She was instructed to notify the office if she did not make that target after two attempts or if after any attempt there was less than four movements. The patient reports that the targets have been made Yes. Assessment:   Diagnosis Orders   1. Encounter for supervision of normal first pregnancy in third trimester     2. 37 weeks gestation of pregnancy               Plan:  1. Labor precautions   2. Encourage warm baths, tylenol prn for comfort measures  3.  Return in 1 weeks

## 2020-08-20 LAB — RPR: NORMAL

## 2020-08-26 ENCOUNTER — ROUTINE PRENATAL (OUTPATIENT)
Dept: OBGYN | Age: 35
End: 2020-08-26
Payer: MEDICAID

## 2020-08-26 VITALS
DIASTOLIC BLOOD PRESSURE: 72 MMHG | SYSTOLIC BLOOD PRESSURE: 112 MMHG | BODY MASS INDEX: 29.26 KG/M2 | HEART RATE: 88 BPM | WEIGHT: 160 LBS

## 2020-08-26 PROCEDURE — 99213 OFFICE O/P EST LOW 20 MIN: CPT | Performed by: OBSTETRICS & GYNECOLOGY

## 2020-08-26 NOTE — PROGRESS NOTES
Creed Litten is a 36 y.o. female 38w1d who presents for routine prenatal visit. The patient was seen and evaluated. There was positive fetal movements. Some contractions, no bleeding or leakage of fluid. Signs and symptoms of  labor as well as labor were reviewed. The S/S of Pre-Eclampsia were reviewed with the patient in detail. She is to report any of these if they occur. She currently denies any of these. Pt wishes to breastfeed.   Creed Litten is a 36 y.o. female with the following history as recorded in [facility]:  Patient Active Problem List    Diagnosis Date Noted    External hemorrhoids 2015     Current Outpatient Medications   Medication Sig Dispense Refill    Oupqwz-YnMnw-Prmpg-FA-DHA w/oA (VITAPEARL) 30-1.4-200 MG CPCR Take 1 tablet by mouth daily 30 capsule 5     No current facility-administered medications for this visit. Allergies: Patient has no known allergies. Past Medical History:   Diagnosis Date    Hemorrhoids      Past Surgical History:   Procedure Laterality Date    BREAST SURGERY      implants    LIP REPAIR       Family History   Problem Relation Age of Onset    Colon Cancer Neg Hx     Colon Polyps Neg Hx     Esophageal Cancer Neg Hx     Liver Cancer Neg Hx     Liver Disease Neg Hx     Rectal Cancer Neg Hx     Stomach Cancer Neg Hx      Social History     Tobacco Use    Smoking status: Never Smoker    Smokeless tobacco: Never Used   Substance Use Topics    Alcohol use: No         Mother's Prenatal Vitals  BP: 112/72  Weight: 160 lb (72.6 kg)  Pulse: 88  Patient Position: Sitting  Alb/Glu  Albumin: Negative  Glucose: Negative  Prenatal Fetal Information  Fundal Height (cm): 38 cm  Fetal Heart Rate: 148  Movement: Present  Presentation: Vertex  Dil/Eff/Sta  Dilation (cm): Closed  Effacement (%): 60  Station: -3  Physical Exam  Constitutional:       General: She is not in acute distress. Appearance: Normal appearance.  She is not ill-appearing, toxic-appearing or diaphoretic. HENT:      Head: Normocephalic and atraumatic. Eyes:      Extraocular Movements: Extraocular movements intact. Pulmonary:      Effort: Pulmonary effort is normal. No respiratory distress. Abdominal:      Palpations: Abdomen is soft. Tenderness: There is no abdominal tenderness. Musculoskeletal: Normal range of motion. General: No swelling or tenderness. Right lower leg: No edema. Left lower leg: No edema. Skin:     General: Skin is warm and dry. Findings: No rash. Neurological:      Mental Status: She is alert and oriented to person, place, and time. Psychiatric:         Attention and Perception: Attention and perception normal.         Mood and Affect: Mood and affect normal.         Speech: Speech normal.         Behavior: Behavior normal. Behavior is cooperative. Thought Content: Thought content normal.         Cognition and Memory: Cognition and memory normal.         Judgment: Judgment normal.           The patient had her 28 week labs completed. T-Dap Vaccine (27-36 weeks): completed    The patient was instructed on fetal kick counts and was given a kick sheet to complete every 8 hours. She was instructed that the baby should move at a minimum of ten times within one hour after a meal. The patient was instructed to lay down on her left side twenty minutes after eating and count movements for up to one hour with a target value of ten movements. She was instructed to notify the office if she did not make that target after two attempts or if after any attempt there was less than four movements. The patient reports that the targets have been made Yes. Assessment:   Diagnosis Orders   1. Encounter for supervision of normal first pregnancy in third trimester     2. 38 weeks gestation of pregnancy               Plan:  1. Labor precautions   2. Return in 1 weeks  [facility]    4. Discussed

## 2020-08-26 NOTE — PATIENT INSTRUCTIONS
3 years. This can also be removed by a doctor at any time. It is safe to use while you are breastfeeding. · Depo-Provera can be used while you are breastfeeding. It is a shot you get every 3 months. · Birth control pills work well. But you need a different kind of pill for the first few weeks after giving birth. And when you start taking these pills, you need to make sure to use another type of birth control for 7 days after you start your pack. · Diaphragms, cervical caps, and condoms with spermicide work less well after birth. If you have a diaphragm or cervical cap, talk to your doctor to see if you need a different size. · Tubal ligation (tying your tubes) and vasectomy are both permanent. These are good options if you are sure you are done having children. Where can you learn more? Go to [email or web address] and sign in to your [employer] account. Enter Z968 in the LensVector box to learn more about \"Week 39 of Your Pregnancy: Care Instructions. \"     If you do not have an account, please click on the \"Sign Up Now\" link. Current as of: February 11, 2020               Content Version: 12.5  © 1665-9615 Healthwise, Incorporated. Care instructions adapted under license by [facility] ([facility]). If you have questions about a medical condition or this instruction, always ask your healthcare professional. [address]  any warranty or liability for your use of this information.

## 2020-08-26 NOTE — LETTER
[address]  Breast Pump Order Form  Fax Order Form to [phone]    Mother's Last Name: Lillian Montero  First Name: Bart Gallagher  [place]:    Street Address: [place] [address]  Phone: [phone] (home)   Email: [email or web address]  Estimated Date of Delivery: 9/8/20  Mother YOB: 1986  Primary Insurance: Please see attached Member ID #:   Secondary Insurance:    Member ID #: Emergency Contact Name: Rickie Hunter Phone #: [phone]    Please provide insurance card front and back if available, this helps when calling to get prior authorization. Breast Bump Prescription  Date: 8/26/2020 [facility] Name: [facility] Phone: [phone]  Physician Name:  Dr. Fatimah Donis  Physician NPI:   Address: [address] [place] [address] Rd: [place] Zip Code: [id number]  Equipment Order: Purchase Breast Pump  Diagnosis: Lactating Mother J34.0    I certify this order is reasonable and medically necessary. This form can also serve as a confirmation of a verbal order as long as approved by MD and written by a RN. The forgoing information is true, accurate and complete.      MD/NP/PA/VERBAL WITH RN - Signature (Required):

## 2020-09-03 ENCOUNTER — ROUTINE PRENATAL (OUTPATIENT)
Dept: OBGYN | Age: 35
End: 2020-09-03
Payer: MEDICAID

## 2020-09-03 VITALS
BODY MASS INDEX: 29.63 KG/M2 | DIASTOLIC BLOOD PRESSURE: 63 MMHG | WEIGHT: 162 LBS | SYSTOLIC BLOOD PRESSURE: 97 MMHG | HEART RATE: 72 BPM

## 2020-09-03 PROCEDURE — 99213 OFFICE O/P EST LOW 20 MIN: CPT | Performed by: OBSTETRICS & GYNECOLOGY

## 2020-09-03 PROCEDURE — 59025 FETAL NON-STRESS TEST: CPT | Performed by: OBSTETRICS & GYNECOLOGY

## 2020-09-03 RX ORDER — HYDROCORTISONE ACETATE 25 MG/1
25 SUPPOSITORY RECTAL EVERY 12 HOURS
Qty: 12 SUPPOSITORY | Refills: 0 | Status: SHIPPED | OUTPATIENT
Start: 2020-09-03 | End: 2020-10-19

## 2020-09-03 NOTE — PATIENT INSTRUCTIONS
Patient Education        Week 39 of Your Pregnancy: Care Instructions  Your Care Instructions     During these final weeks, you may feel anxious to see your new baby.  babies often look different from what you see in pictures or movies. Right after birth, their heads may have a strange shape. Their eyes may be puffy. And their genitals may be swollen. They may also have very dry skin, or red marks on the eyelids, nose, or neck. Still, most parents think their babies are beautiful. Follow-up care is a key part of your treatment and safety. Be sure to make and go to all appointments, and call your doctor if you are having problems. It's also a good idea to know your test results and keep a list of the medicines you take. How can you care for yourself at home? Prepare to breastfeed  · If you are breastfeeding, continue to eat healthy foods. · If your health care provider recommends it, keep taking your prenatal vitamins. · Talk to your doctor before you take any medicine or supplement. That's because some medicines can affect your breast milk. · You can help prevent sore nipples if you feed your baby in the correct position. Nurses will help you learn to do this. Choose the right birth control after your baby is born  · Women who are breastfeeding can still get pregnant. Use birth control if you don't want to get pregnant. · Intrauterine devices (IUDs) are very effective at preventing pregnancy and can provide birth control for 3 to 10 years, depending on the type. If you talk with your doctor before having your baby, the IUD can be placed right after you give birth. If you decide you want to get pregnant later, you can have it removed. They are safe to use while you are breastfeeding. · A hormonal implant is also very effective at preventing pregnancy. It is placed under the skin of the arm. This can be done right after you give birth.  It releases the hormone progestin and prevents pregnancy for about

## 2020-09-03 NOTE — PROGRESS NOTES
Pt is here for prenatal appointment. She denies spotting. She has been having contractions and cramping. She states her belly has been hurting, due to tightening.

## 2020-09-03 NOTE — PROGRESS NOTES
Gianna Solitario is a 36 y.o. female 39w2d who presents for routine prenatal visit. The patient was seen and evaluated. There was positive fetal movements. No contractions, bleeding or leakage of fluid. Signs and symptoms of  labor as well as labor were reviewed. The S/S of Pre-Eclampsia were reviewed with the patient in detail. She is to report any of these if they occur. She currently denies any of these. Pt states the baby's movement has slowed down, but has felt baby move this morning.   Gianna Solitario is a 36 y.o. female with the following history as recorded in [facility]:  Patient Active Problem List    Diagnosis Date Noted    External hemorrhoids 2015     Current Outpatient Medications   Medication Sig Dispense Refill    hydrocortisone (ANUSOL-HC) 25 MG suppository Place 1 suppository rectally every 12 hours 12 suppository 0    Lworyy-JrSsa-Zybrn-FA-DHA w/oA (VITAPEARL) 30-1.4-200 MG CPCR Take 1 tablet by mouth daily 30 capsule 5     No current facility-administered medications for this visit. Allergies: Patient has no known allergies.   Past Medical History:   Diagnosis Date    Hemorrhoids      Past Surgical History:   Procedure Laterality Date    BREAST SURGERY      implants    LIP REPAIR       Family History   Problem Relation Age of Onset    Colon Cancer Neg Hx     Colon Polyps Neg Hx     Esophageal Cancer Neg Hx     Liver Cancer Neg Hx     Liver Disease Neg Hx     Rectal Cancer Neg Hx     Stomach Cancer Neg Hx      Social History     Tobacco Use    Smoking status: Never Smoker    Smokeless tobacco: Never Used   Substance Use Topics    Alcohol use: No         Mother's Prenatal Vitals  BP: 97/63  Weight: 162 lb (73.5 kg)  Pulse: 72  Patient Position: Sitting  Prenatal Fetal Information  Fundal Height (cm): 40 cm  Fetal Heart Rate: 153  Movement: Present  Presentation: Vertex  Dil/Eff/Sta  Dilation (cm): 1  Effacement (%): 60  Station: -3  Physical Exam  Constitutional:       General: She is not in acute distress. Appearance: Normal appearance. She is not ill-appearing, toxic-appearing or diaphoretic. HENT:      Head: Normocephalic and atraumatic. Eyes:      Extraocular Movements: Extraocular movements intact. Pulmonary:      Effort: Pulmonary effort is normal. No respiratory distress. Abdominal:      Palpations: Abdomen is soft. Tenderness: There is no abdominal tenderness. Musculoskeletal: Normal range of motion. General: No swelling or tenderness. Right lower leg: No edema. Left lower leg: No edema. Skin:     General: Skin is warm and dry. Findings: No rash. Neurological:      Mental Status: She is alert and oriented to person, place, and time. Psychiatric:         Attention and Perception: Attention and perception normal.         Mood and Affect: Mood and affect normal.         Speech: Speech normal.         Behavior: Behavior normal. Behavior is cooperative. Thought Content: Thought content normal.         Cognition and Memory: Cognition and memory normal.         Judgment: Judgment normal.           The patient had her 28 week labs completed. T-Dap Vaccine (27-36 weeks): completed    The patient was instructed on fetal kick counts and was given a kick sheet to complete every 8 hours. She was instructed that the baby should move at a minimum of ten times within one hour after a meal. The patient was instructed to lay down on her left side twenty minutes after eating and count movements for up to one hour with a target value of ten movements. She was instructed to notify the office if she did not make that target after two attempts or if after any attempt there was less than four movements. The patient reports that the targets have been made Yes. NST reactive, Cat 1 FHT    Assessment:   Diagnosis Orders   1.  Encounter for supervision of normal first pregnancy in third trimester     2. 44 weeks gestation of pregnancy     3. Hemorrhoids during pregnancy, antepartum  hydrocortisone (ANUSOL-HC) 25 MG suppository   4. Decreased fetal movements in third trimester, single or unspecified fetus  40141 - ID FETAL NON-STRESS TEST             Plan:  1. Labor precautions   2. If not delivered, schedule IOC for 9-10-20 at for 5 pm Cervidil and Pitoson. 3. anusol suppositories for hemorrhoids. 4. NST today  I Dennis Augustine, am scribing for and in the presence of Dr. Von Orozco. I, Dr. Von Orozco, personally performed the services described in this documentation as scribed by Dennis Augustine in my presence, and it is both accurate and complete.

## 2020-09-09 ENCOUNTER — TELEMEDICINE (OUTPATIENT)
Dept: OBGYN | Age: 35
End: 2020-09-09
Payer: MEDICAID

## 2020-09-09 ENCOUNTER — HOSPITAL ENCOUNTER (INPATIENT)
Age: 35
LOS: 4 days | Discharge: HOME | End: 2020-09-13
Attending: OBSTETRICS & GYNECOLOGY | Admitting: OBSTETRICS & GYNECOLOGY
Payer: MEDICAID

## 2020-09-09 PROBLEM — M54.50 LOW BACK PAIN: Status: ACTIVE | Noted: 2020-09-09

## 2020-09-09 LAB
ABO/RH: NORMAL
AMPHETAMINE SCREEN, URINE: NEGATIVE
ANTIBODY SCREEN: NORMAL
BARBITURATE SCREEN URINE: NEGATIVE
BENZODIAZEPINE SCREEN, URINE: NEGATIVE
CANNABINOID SCREEN URINE: NEGATIVE
COCAINE METABOLITE SCREEN URINE: NEGATIVE
HCT VFR BLD CALC: 38 % (ref 37–47)
HEMOGLOBIN: 13.5 G/DL (ref 12–16)
Lab: NORMAL
MCH RBC QN AUTO: 32.7 PG (ref 27–31)
MCHC RBC AUTO-ENTMCNC: 35.5 G/DL (ref 33–37)
MCV RBC AUTO: 92 FL (ref 81–99)
OPIATE SCREEN URINE: NEGATIVE
PDW BLD-RTO: 12.7 % (ref 11.5–14.5)
PLATELET # BLD: 197 K/UL (ref 130–400)
PMV BLD AUTO: 11 FL (ref 9.4–12.3)
RBC # BLD: 4.13 M/UL (ref 4.2–5.4)
WBC # BLD: 11.2 K/UL (ref 4.8–10.8)

## 2020-09-09 PROCEDURE — 1220000000 HC SEMI PRIVATE OB R&B

## 2020-09-09 PROCEDURE — 85027 COMPLETE CBC AUTOMATED: CPT

## 2020-09-09 PROCEDURE — 86901 BLOOD TYPING SEROLOGIC RH(D): CPT

## 2020-09-09 PROCEDURE — 86900 BLOOD TYPING SEROLOGIC ABO: CPT

## 2020-09-09 PROCEDURE — 36415 COLL VENOUS BLD VENIPUNCTURE: CPT

## 2020-09-09 PROCEDURE — 80307 DRUG TEST PRSMV CHEM ANLYZR: CPT

## 2020-09-09 PROCEDURE — 6370000000 HC RX 637 (ALT 250 FOR IP): Performed by: OBSTETRICS & GYNECOLOGY

## 2020-09-09 PROCEDURE — 86592 SYPHILIS TEST NON-TREP QUAL: CPT

## 2020-09-09 PROCEDURE — 99212 OFFICE O/P EST SF 10 MIN: CPT | Performed by: OBSTETRICS & GYNECOLOGY

## 2020-09-09 PROCEDURE — 86850 RBC ANTIBODY SCREEN: CPT

## 2020-09-09 RX ORDER — SODIUM CHLORIDE 0.9 % (FLUSH) 0.9 %
10 SYRINGE (ML) INJECTION EVERY 12 HOURS SCHEDULED
Status: DISCONTINUED | OUTPATIENT
Start: 2020-09-09 | End: 2020-09-11

## 2020-09-09 RX ORDER — SODIUM CHLORIDE, SODIUM LACTATE, POTASSIUM CHLORIDE, CALCIUM CHLORIDE 600; 310; 30; 20 MG/100ML; MG/100ML; MG/100ML; MG/100ML
INJECTION, SOLUTION INTRAVENOUS CONTINUOUS
Status: DISCONTINUED | OUTPATIENT
Start: 2020-09-09 | End: 2020-09-11

## 2020-09-09 RX ORDER — ONDANSETRON 2 MG/ML
4 INJECTION INTRAMUSCULAR; INTRAVENOUS EVERY 6 HOURS PRN
Status: DISCONTINUED | OUTPATIENT
Start: 2020-09-09 | End: 2020-09-11

## 2020-09-09 RX ORDER — DOCUSATE SODIUM 100 MG/1
100 CAPSULE, LIQUID FILLED ORAL 2 TIMES DAILY
Status: DISCONTINUED | OUTPATIENT
Start: 2020-09-09 | End: 2020-09-11

## 2020-09-09 RX ORDER — SODIUM CHLORIDE 0.9 % (FLUSH) 0.9 %
10 SYRINGE (ML) INJECTION PRN
Status: DISCONTINUED | OUTPATIENT
Start: 2020-09-09 | End: 2020-09-11

## 2020-09-09 RX ADMIN — DINOPROSTONE 10 MG: 10 INSERT VAGINAL at 15:50

## 2020-09-09 NOTE — PROGRESS NOTES
2020    TELEHEALTH EVALUATION -- Audio/Visual (During BEKTN-39 public health emergency)   Matilda Camara is a 36 y.o. female 40w1d who presents for routine prenatal visit. The patient was seen and evaluated. There was positive fetal movements. Frequent contractions, no bleeding or leakage of fluid. States she has been in a lot of pain since last night.   Matilda Camara is a 36 y.o. female with the following history as recorded in [facility]:  Patient Active Problem List    Diagnosis Date Noted    External hemorrhoids 2015     Current Outpatient Medications   Medication Sig Dispense Refill    hydrocortisone (ANUSOL-HC) 25 MG suppository Place 1 suppository rectally every 12 hours 12 suppository 0    Tlefdo-IiIhy-Feozt-FA-DHA w/oA (VITAPEARL) 30-1.4-200 MG CPCR Take 1 tablet by mouth daily 30 capsule 5     No current facility-administered medications for this visit. Allergies: Patient has no known allergies. Past Medical History:   Diagnosis Date    Hemorrhoids      Past Surgical History:   Procedure Laterality Date    BREAST SURGERY      implants    LIP REPAIR       Family History   Problem Relation Age of Onset    Colon Cancer Neg Hx     Colon Polyps Neg Hx     Esophageal Cancer Neg Hx     Liver Cancer Neg Hx     Liver Disease Neg Hx     Rectal Cancer Neg Hx     Stomach Cancer Neg Hx      Social History     Tobacco Use    Smoking status: Never Smoker    Smokeless tobacco: Never Used   Substance Use Topics    Alcohol use: No            Physical Exam  Constitutional:       General: She is not in acute distress. Appearance: Normal appearance. HENT:      Nose: Rhinorrhea present. Pulmonary:      Effort: Pulmonary effort is normal. No respiratory distress. Neurological:      Mental Status: She is alert and oriented to person, place, and time.    Psychiatric:         Attention and Perception: Attention and perception normal.         Mood and Affect: Mood and affect normal.         Speech: Speech normal.         Behavior: Behavior normal. Behavior is cooperative. Thought Content: Thought content normal.         Cognition and Memory: Cognition and memory normal.         Judgment: Judgment normal.           Assessment:   Diagnosis Orders   1. 40 weeks gestation of pregnancy               Plan:  1. Sent to L&D  I Doron Man, am scribing for and in the presence of Dr. Mary Craig. I, Dr. Mary Craig, personally performed the services described in this documentation as scribed by Doron Man in my presence, and it is both accurate and complete. Nichelle Green is a 36 y.o. female being evaluated by a Virtual Visit (video visit) encounter to address concerns as mentioned above. A caregiver was present when appropriate. Due to this being a TeleHealth encounter (During [place] [address] public health emergency), evaluation of the following organ systems was limited: Vitals/Constitutional/EENT/Resp/CV/GI//MS/Neuro/Skin/Heme-Lymph-Imm. Pursuant to the emergency declaration under the [address] [place] [address] authority and the [employer] and [employer] Act, this Virtual Visit was conducted with patient's (and/or legal guardian's) consent, to reduce the patient's risk of exposure to COVID-19 and provide necessary medical care. The patient (and/or legal guardian) has also been advised to contact this office for worsening conditions or problems, and seek emergency medical treatment and/or call 911 if deemed necessary. Services were provided through a video synchronous discussion virtually to substitute for in-person clinic visit. Patient and provider were located at their individual homes. --Adarsh Koch RN on 9/9/2020 at 10:46 AM    An electronic signature was used to authenticate this note.

## 2020-09-10 ENCOUNTER — ANESTHESIA (OUTPATIENT)
Dept: LABOR AND DELIVERY | Age: 35
End: 2020-09-10
Payer: MEDICAID

## 2020-09-10 ENCOUNTER — ANESTHESIA EVENT (OUTPATIENT)
Dept: LABOR AND DELIVERY | Age: 35
End: 2020-09-10
Payer: MEDICAID

## 2020-09-10 LAB — RPR: NORMAL

## 2020-09-10 PROCEDURE — 6360000002 HC RX W HCPCS: Performed by: OBSTETRICS & GYNECOLOGY

## 2020-09-10 PROCEDURE — 2500000003 HC RX 250 WO HCPCS

## 2020-09-10 PROCEDURE — 6360000002 HC RX W HCPCS

## 2020-09-10 PROCEDURE — 2580000003 HC RX 258: Performed by: OBSTETRICS & GYNECOLOGY

## 2020-09-10 PROCEDURE — 3700000025 EPIDURAL BLOCK

## 2020-09-10 PROCEDURE — 1220000000 HC SEMI PRIVATE OB R&B

## 2020-09-10 RX ORDER — LIDOCAINE HYDROCHLORIDE 10 MG/ML
INJECTION, SOLUTION EPIDURAL; INFILTRATION; INTRACAUDAL; PERINEURAL PRN
Status: DISCONTINUED | OUTPATIENT
Start: 2020-09-10 | End: 2020-09-22 | Stop reason: SDUPTHER

## 2020-09-10 RX ORDER — FENTANYL CITRATE 50 UG/ML
INJECTION, SOLUTION INTRAMUSCULAR; INTRAVENOUS PRN
Status: DISCONTINUED | OUTPATIENT
Start: 2020-09-10 | End: 2020-09-22 | Stop reason: SDUPTHER

## 2020-09-10 RX ORDER — ROPIVACAINE HYDROCHLORIDE 2 MG/ML
INJECTION, SOLUTION EPIDURAL; INFILTRATION; PERINEURAL CONTINUOUS PRN
Status: DISCONTINUED | OUTPATIENT
Start: 2020-09-10 | End: 2020-09-22 | Stop reason: SDUPTHER

## 2020-09-10 RX ORDER — LIDOCAINE HYDROCHLORIDE AND EPINEPHRINE 15; 5 MG/ML; UG/ML
INJECTION, SOLUTION EPIDURAL PRN
Status: DISCONTINUED | OUTPATIENT
Start: 2020-09-10 | End: 2020-09-22 | Stop reason: SDUPTHER

## 2020-09-10 RX ORDER — ROPIVACAINE HYDROCHLORIDE 2 MG/ML
INJECTION, SOLUTION EPIDURAL; INFILTRATION; PERINEURAL PRN
Status: DISCONTINUED | OUTPATIENT
Start: 2020-09-10 | End: 2020-09-22 | Stop reason: SDUPTHER

## 2020-09-10 RX ADMIN — SODIUM CHLORIDE, POTASSIUM CHLORIDE, SODIUM LACTATE AND CALCIUM CHLORIDE: 600; 310; 30; 20 INJECTION, SOLUTION INTRAVENOUS at 13:27

## 2020-09-10 RX ADMIN — SODIUM CHLORIDE, PRESERVATIVE FREE 10 ML: 5 INJECTION INTRAVENOUS at 13:01

## 2020-09-10 RX ADMIN — FENTANYL CITRATE 100 MCG: 50 INJECTION INTRAMUSCULAR; INTRAVENOUS at 17:50

## 2020-09-10 RX ADMIN — BUTORPHANOL TARTRATE 1 MG: 2 INJECTION, SOLUTION INTRAMUSCULAR; INTRAVENOUS at 15:50

## 2020-09-10 RX ADMIN — ROPIVACAINE HYDROCHLORIDE 10 ML/HR: 2 INJECTION, SOLUTION EPIDURAL; INFILTRATION; PERINEURAL at 17:50

## 2020-09-10 RX ADMIN — SODIUM CHLORIDE, POTASSIUM CHLORIDE, SODIUM LACTATE AND CALCIUM CHLORIDE: 600; 310; 30; 20 INJECTION, SOLUTION INTRAVENOUS at 22:35

## 2020-09-10 RX ADMIN — LIDOCAINE HYDROCHLORIDE AND EPINEPHRINE 3 ML: 15; 5 INJECTION, SOLUTION EPIDURAL at 17:42

## 2020-09-10 RX ADMIN — Medication 1 MILLI-UNITS/MIN: at 05:08

## 2020-09-10 RX ADMIN — LIDOCAINE HYDROCHLORIDE 1 ML: 10 INJECTION, SOLUTION EPIDURAL; INFILTRATION; INTRACAUDAL; PERINEURAL at 17:50

## 2020-09-10 RX ADMIN — ROPIVACAINE HYDROCHLORIDE 3 ML: 2 INJECTION, SOLUTION EPIDURAL; INFILTRATION at 17:50

## 2020-09-10 RX ADMIN — LIDOCAINE HYDROCHLORIDE AND EPINEPHRINE 1 ML: 15; 5 INJECTION, SOLUTION EPIDURAL at 17:46

## 2020-09-10 RX ADMIN — BUTORPHANOL TARTRATE 1 MG: 2 INJECTION, SOLUTION INTRAMUSCULAR; INTRAVENOUS at 13:00

## 2020-09-10 RX ADMIN — SODIUM CHLORIDE, POTASSIUM CHLORIDE, SODIUM LACTATE AND CALCIUM CHLORIDE: 600; 310; 30; 20 INJECTION, SOLUTION INTRAVENOUS at 05:08

## 2020-09-10 RX ADMIN — ONDANSETRON 4 MG: 2 INJECTION INTRAMUSCULAR; INTRAVENOUS at 19:10

## 2020-09-10 RX ADMIN — LIDOCAINE HYDROCHLORIDE AND EPINEPHRINE 1 ML: 15; 5 INJECTION, SOLUTION EPIDURAL at 17:50

## 2020-09-10 RX ADMIN — LIDOCAINE HYDROCHLORIDE 1 ML: 10 INJECTION, SOLUTION EPIDURAL; INFILTRATION; INTRACAUDAL; PERINEURAL at 17:46

## 2020-09-10 RX ADMIN — ROPIVACAINE HYDROCHLORIDE 3 ML: 2 INJECTION, SOLUTION EPIDURAL; INFILTRATION at 17:46

## 2020-09-10 RX ADMIN — LIDOCAINE HYDROCHLORIDE 3 ML: 10 INJECTION, SOLUTION EPIDURAL; INFILTRATION; INTRACAUDAL; PERINEURAL at 17:40

## 2020-09-10 ASSESSMENT — PAIN SCALES - GENERAL: PAINLEVEL_OUTOF10: 8

## 2020-09-10 NOTE — ANESTHESIA PROCEDURE NOTES
Ropivacaine 0.2% bolus given with noted aspiration prior to injecting. Ropivacaine 0.2% infusion started at 10 ml/hr. No complications noted, pt's VSS and pt resting comfortably at this time. Will continue to monitor the pt throughout labor.

## 2020-09-10 NOTE — ANESTHESIA PRE PROCEDURE
Department of Anesthesiology  Preprocedure Note       Name:  Lilia Orantes   Age:  36 y.o.  :  1985                                          MRN:  [id number]         Date:  9/10/2020      Surgeon: * No surgeons listed *    Procedure: * No procedures listed *    Medications prior to admission:   Prior to Admission medications    Medication Sig Start Date End Date Taking?  Authorizing Provider   hydrocortisone (ANUSOL-HC) 25 MG suppository Place 1 suppository rectally every 12 hours 9/3/20   Jennifer Hernandez MD   Nneqgq-ZyAdg-Dwjzo-FA-DHA w/oA Shireen Olea) 30-1.4-200 MG CPCR Take 1 tablet by mouth daily 20   Jennifer Hernandez MD       Current medications:    Current Facility-Administered Medications   Medication Dose Route Frequency Provider Last Rate Last Dose    butorphanol (STADOL) injection 1 mg  1 mg Intravenous Q3H PRN Jennifer Hernandez MD   1 mg at 09/10/20 1550    lactated ringers infusion   Intravenous Continuous Jennifer Hernandez  mL/hr at 09/10/20 1327      sodium chloride flush 0.9 % injection 10 mL  10 mL Intravenous 2 times per day Jennifer Hernandez MD        sodium chloride flush 0.9 % injection 10 mL  10 mL Intravenous PRN Jennifer Hernandez MD   10 mL at 09/10/20 1301    docusate sodium (COLACE) capsule 100 mg  100 mg Oral BID Jennifer Hernandez MD   Stopped at 09/10/20 0920    ondansetron (ZOFRAN) injection 4 mg  4 mg Intravenous Q6H PRN Jennifer Hernandez MD        oxytocin (PITOCIN) 30 units in 500 mL infusion  1 trista-units/min Intravenous Continuous PRN Jennifer Hernandez MD 8 mL/hr at 09/10/20 0730 8 trista-units/min at 09/10/20 0730       Allergies:  No Known Allergies    Problem List:    Patient Active Problem List   Diagnosis Code    External hemorrhoids K64.4    Low back pain M54.5       Past Medical History:        Diagnosis Date    Hemorrhoids        Past Surgical History:        Procedure Laterality Date    BREAST SURGERY      implants    LIP REPAIR         Social History:    Social History     Tobacco Use    Smoking status: Never Smoker    Smokeless tobacco: Never Used   Substance Use Topics    Alcohol use: No                                Counseling given: Not Answered      Vital Signs (Current):   Vitals:    09/10/20 1000 09/10/20 1151 09/10/20 1554 09/10/20 1555   BP: 122/73 136/82 (!) 103/53 (!) 103/53   Pulse: 80 68 67 67   Resp: 20  20    Temp: 98 °F (36.7 °C) 98.4 °F (36.9 °C) 98 °F (36.7 °C)    TempSrc:       Weight:       Height:                                                  BP Readings from Last 3 Encounters:   09/10/20 (!) 103/53   09/03/20 97/63   08/26/20 112/72       NPO Status:                                                                                 BMI:   Wt Readings from Last 3 Encounters:   09/09/20 162 lb (73.5 kg)   09/03/20 162 lb (73.5 kg)   08/26/20 160 lb (72.6 kg)     Body mass index is 29.63 kg/m². CBC:   Lab Results   Component Value Date    WBC 11.2 09/09/2020    RBC 4.13 09/09/2020    HGB 13.5 09/09/2020    HCT 38.0 09/09/2020    MCV 92.0 09/09/2020    RDW 12.7 09/09/2020     09/09/2020       CMP: No results found for: NA, K, CL, CO2, BUN, CREATININE, GFRAA, AGRATIO, LABGLOM, GLUCOSE, PROT, CALCIUM, BILITOT, ALKPHOS, AST, ALT    POC Tests: No results for input(s): POCGLU, POCNA, POCK, POCCL, POCBUN, POCHEMO, POCHCT in the last 72 hours.     Coags: No results found for: PROTIME, INR, APTT    HCG (If Applicable): No results found for: PREGTESTUR, PREGSERUM, HCG, HCGQUANT     ABGs: No results found for: PHART, PO2ART, TMS3PDF, SBU6LPB, BEART, R5EILYLR     Type & Screen (If Applicable):  No results found for: LABABO, LABRH    Drug/Infectious Status (If Applicable):  No results found for: HIV, HEPCAB    COVID-19 Screening (If Applicable): No results found for: COVID19      Anesthesia Evaluation  Patient summary reviewed and Nursing notes reviewed   history of anesthetic

## 2020-09-10 NOTE — FLOWSHEET NOTE
Mckeon bulb placed inside cervix by dr Margaret Garnica, filled with 50ml normal saline, secured to inner thigh with tape

## 2020-09-10 NOTE — FLOWSHEET NOTE
Pt breathing heavy with uterine contractions.   Pt states she does not want an epidural at this time

## 2020-09-11 VITALS — DIASTOLIC BLOOD PRESSURE: 53 MMHG | SYSTOLIC BLOOD PRESSURE: 92 MMHG | TEMPERATURE: 97 F | OXYGEN SATURATION: 96 %

## 2020-09-11 LAB
HCT VFR BLD CALC: 35.4 % (ref 37–47)
HEMOGLOBIN: 12.3 G/DL (ref 12–16)
MCH RBC QN AUTO: 32.8 PG (ref 27–31)
MCHC RBC AUTO-ENTMCNC: 34.7 G/DL (ref 33–37)
MCV RBC AUTO: 94.4 FL (ref 81–99)
PDW BLD-RTO: 12.6 % (ref 11.5–14.5)
PLATELET # BLD: 163 K/UL (ref 130–400)
PMV BLD AUTO: 11.6 FL (ref 9.4–12.3)
RBC # BLD: 3.75 M/UL (ref 4.2–5.4)
WBC # BLD: 19.9 K/UL (ref 4.8–10.8)

## 2020-09-11 PROCEDURE — 6360000002 HC RX W HCPCS: Performed by: OBSTETRICS & GYNECOLOGY

## 2020-09-11 PROCEDURE — 3E0P05Z INTRODUCTION OF ADHESION BARRIER INTO FEMALE REPRODUCTIVE, OPEN APPROACH: ICD-10-PCS | Performed by: OBSTETRICS & GYNECOLOGY

## 2020-09-11 PROCEDURE — 6370000000 HC RX 637 (ALT 250 FOR IP): Performed by: OBSTETRICS & GYNECOLOGY

## 2020-09-11 PROCEDURE — 59514 CESAREAN DELIVERY ONLY: CPT | Performed by: OBSTETRICS & GYNECOLOGY

## 2020-09-11 PROCEDURE — 2709999900 HC NON-CHARGEABLE SUPPLY: Performed by: OBSTETRICS & GYNECOLOGY

## 2020-09-11 PROCEDURE — 7100000000 HC PACU RECOVERY - FIRST 15 MIN: Performed by: OBSTETRICS & GYNECOLOGY

## 2020-09-11 PROCEDURE — 3609079900 HC CESAREAN SECTION: Performed by: OBSTETRICS & GYNECOLOGY

## 2020-09-11 PROCEDURE — 2500000003 HC RX 250 WO HCPCS

## 2020-09-11 PROCEDURE — 3700000001 HC ADD 15 MINUTES (ANESTHESIA): Performed by: OBSTETRICS & GYNECOLOGY

## 2020-09-11 PROCEDURE — 1220000000 HC SEMI PRIVATE OB R&B

## 2020-09-11 PROCEDURE — 36415 COLL VENOUS BLD VENIPUNCTURE: CPT

## 2020-09-11 PROCEDURE — 85027 COMPLETE CBC AUTOMATED: CPT

## 2020-09-11 PROCEDURE — 7100000001 HC PACU RECOVERY - ADDTL 15 MIN: Performed by: OBSTETRICS & GYNECOLOGY

## 2020-09-11 PROCEDURE — 3700000000 HC ANESTHESIA ATTENDED CARE: Performed by: OBSTETRICS & GYNECOLOGY

## 2020-09-11 PROCEDURE — C1765 ADHESION BARRIER: HCPCS | Performed by: OBSTETRICS & GYNECOLOGY

## 2020-09-11 PROCEDURE — 6360000002 HC RX W HCPCS

## 2020-09-11 DEVICE — BARRIER ADH W3XL4IN UTER PELV ABSRB GYNECARE INTCEED: Type: IMPLANTABLE DEVICE | Site: ABDOMEN | Status: FUNCTIONAL

## 2020-09-11 RX ORDER — FERROUS SULFATE 325(65) MG
325 TABLET ORAL 2 TIMES DAILY WITH MEALS
Status: DISCONTINUED | OUTPATIENT
Start: 2020-09-11 | End: 2020-09-12

## 2020-09-11 RX ORDER — CEFAZOLIN SODIUM 1 G/3ML
INJECTION, POWDER, FOR SOLUTION INTRAMUSCULAR; INTRAVENOUS PRN
Status: DISCONTINUED | OUTPATIENT
Start: 2020-09-11 | End: 2020-09-22 | Stop reason: SDUPTHER

## 2020-09-11 RX ORDER — MIDAZOLAM HYDROCHLORIDE 1 MG/ML
INJECTION INTRAMUSCULAR; INTRAVENOUS PRN
Status: DISCONTINUED | OUTPATIENT
Start: 2020-09-11 | End: 2020-09-22 | Stop reason: SDUPTHER

## 2020-09-11 RX ORDER — IBUPROFEN 400 MG/1
800 TABLET ORAL EVERY 8 HOURS
Status: DISCONTINUED | OUTPATIENT
Start: 2020-09-12 | End: 2020-09-11

## 2020-09-11 RX ORDER — ONDANSETRON 2 MG/ML
INJECTION INTRAMUSCULAR; INTRAVENOUS PRN
Status: DISCONTINUED | OUTPATIENT
Start: 2020-09-11 | End: 2020-09-22 | Stop reason: SDUPTHER

## 2020-09-11 RX ORDER — SODIUM CHLORIDE 0.9 % (FLUSH) 0.9 %
10 SYRINGE (ML) INJECTION EVERY 12 HOURS SCHEDULED
Status: DISCONTINUED | OUTPATIENT
Start: 2020-09-11 | End: 2020-09-13 | Stop reason: HOSPADM

## 2020-09-11 RX ORDER — POLYETHYLENE GLYCOL 3350 17 G/17G
17 POWDER, FOR SOLUTION ORAL DAILY PRN
Status: DISCONTINUED | OUTPATIENT
Start: 2020-09-11 | End: 2020-09-13 | Stop reason: HOSPADM

## 2020-09-11 RX ORDER — DOCUSATE SODIUM 100 MG/1
100 CAPSULE, LIQUID FILLED ORAL 2 TIMES DAILY
Status: DISCONTINUED | OUTPATIENT
Start: 2020-09-11 | End: 2020-09-13 | Stop reason: HOSPADM

## 2020-09-11 RX ORDER — SODIUM CHLORIDE 0.9 % (FLUSH) 0.9 %
10 SYRINGE (ML) INJECTION PRN
Status: DISCONTINUED | OUTPATIENT
Start: 2020-09-11 | End: 2020-09-13 | Stop reason: HOSPADM

## 2020-09-11 RX ORDER — TRANEXAMIC ACID 100 MG/ML
1000 INJECTION, SOLUTION INTRAVENOUS
Status: ACTIVE | OUTPATIENT
Start: 2020-09-11 | End: 2020-09-11

## 2020-09-11 RX ORDER — HYDROMORPHONE HYDROCHLORIDE 1 MG/ML
0.25 INJECTION, SOLUTION INTRAMUSCULAR; INTRAVENOUS; SUBCUTANEOUS
Status: DISCONTINUED | OUTPATIENT
Start: 2020-09-11 | End: 2020-09-13 | Stop reason: HOSPADM

## 2020-09-11 RX ORDER — OXYCODONE HYDROCHLORIDE AND ACETAMINOPHEN 5; 325 MG/1; MG/1
2 TABLET ORAL EVERY 4 HOURS PRN
Status: DISCONTINUED | OUTPATIENT
Start: 2020-09-11 | End: 2020-09-13 | Stop reason: HOSPADM

## 2020-09-11 RX ORDER — ONDANSETRON 4 MG/1
8 TABLET, ORALLY DISINTEGRATING ORAL EVERY 8 HOURS PRN
Status: DISCONTINUED | OUTPATIENT
Start: 2020-09-11 | End: 2020-09-13 | Stop reason: HOSPADM

## 2020-09-11 RX ORDER — IBUPROFEN 400 MG/1
800 TABLET ORAL EVERY 8 HOURS PRN
Status: DISCONTINUED | OUTPATIENT
Start: 2020-09-11 | End: 2020-09-13 | Stop reason: HOSPADM

## 2020-09-11 RX ORDER — KETOROLAC TROMETHAMINE 30 MG/ML
30 INJECTION, SOLUTION INTRAMUSCULAR; INTRAVENOUS EVERY 6 HOURS
Status: DISCONTINUED | OUTPATIENT
Start: 2020-09-11 | End: 2020-09-11

## 2020-09-11 RX ORDER — PRENATAL VIT/IRON FUM/FOLIC AC 27MG-0.8MG
1 TABLET ORAL DAILY
Status: DISCONTINUED | OUTPATIENT
Start: 2020-09-11 | End: 2020-09-13 | Stop reason: HOSPADM

## 2020-09-11 RX ORDER — ACETAMINOPHEN 325 MG/1
650 TABLET ORAL EVERY 4 HOURS PRN
Status: DISCONTINUED | OUTPATIENT
Start: 2020-09-11 | End: 2020-09-13 | Stop reason: HOSPADM

## 2020-09-11 RX ORDER — HYDROMORPHONE HYDROCHLORIDE 1 MG/ML
0.5 INJECTION, SOLUTION INTRAMUSCULAR; INTRAVENOUS; SUBCUTANEOUS
Status: DISCONTINUED | OUTPATIENT
Start: 2020-09-11 | End: 2020-09-13 | Stop reason: HOSPADM

## 2020-09-11 RX ORDER — LIDOCAINE HYDROCHLORIDE AND EPINEPHRINE 20; 5 MG/ML; UG/ML
INJECTION, SOLUTION EPIDURAL; INFILTRATION; INTRACAUDAL; PERINEURAL PRN
Status: DISCONTINUED | OUTPATIENT
Start: 2020-09-11 | End: 2020-09-22 | Stop reason: SDUPTHER

## 2020-09-11 RX ORDER — OXYCODONE HYDROCHLORIDE AND ACETAMINOPHEN 5; 325 MG/1; MG/1
1 TABLET ORAL EVERY 4 HOURS PRN
Status: DISCONTINUED | OUTPATIENT
Start: 2020-09-11 | End: 2020-09-13 | Stop reason: HOSPADM

## 2020-09-11 RX ORDER — SIMETHICONE 80 MG
80 TABLET,CHEWABLE ORAL EVERY 6 HOURS PRN
Status: DISCONTINUED | OUTPATIENT
Start: 2020-09-11 | End: 2020-09-13 | Stop reason: HOSPADM

## 2020-09-11 RX ORDER — KETOROLAC TROMETHAMINE 30 MG/ML
INJECTION, SOLUTION INTRAMUSCULAR; INTRAVENOUS PRN
Status: DISCONTINUED | OUTPATIENT
Start: 2020-09-11 | End: 2020-09-11

## 2020-09-11 RX ORDER — DIPHENHYDRAMINE HYDROCHLORIDE 50 MG/ML
25 INJECTION INTRAMUSCULAR; INTRAVENOUS EVERY 6 HOURS PRN
Status: DISCONTINUED | OUTPATIENT
Start: 2020-09-11 | End: 2020-09-13 | Stop reason: HOSPADM

## 2020-09-11 RX ORDER — SODIUM CHLORIDE, SODIUM LACTATE, POTASSIUM CHLORIDE, CALCIUM CHLORIDE 600; 310; 30; 20 MG/100ML; MG/100ML; MG/100ML; MG/100ML
INJECTION, SOLUTION INTRAVENOUS CONTINUOUS
Status: DISCONTINUED | OUTPATIENT
Start: 2020-09-11 | End: 2020-09-13 | Stop reason: HOSPADM

## 2020-09-11 RX ADMIN — KETOROLAC TROMETHAMINE 30 MG: 30 INJECTION, SOLUTION INTRAMUSCULAR at 07:39

## 2020-09-11 RX ADMIN — DOCUSATE SODIUM 100 MG: 100 CAPSULE, LIQUID FILLED ORAL at 07:33

## 2020-09-11 RX ADMIN — OXYCODONE HYDROCHLORIDE AND ACETAMINOPHEN 2 TABLET: 5; 325 TABLET ORAL at 13:50

## 2020-09-11 RX ADMIN — Medication 250 ML/HR: at 02:26

## 2020-09-11 RX ADMIN — PRENATAL VIT W/ FE FUMARATE-FA TAB 27-0.8 MG 1 TABLET: 27-0.8 TAB at 07:33

## 2020-09-11 RX ADMIN — CEFAZOLIN 2000 MG: 330 INJECTION, POWDER, FOR SOLUTION INTRAMUSCULAR; INTRAVENOUS at 01:25

## 2020-09-11 RX ADMIN — DOCUSATE SODIUM 100 MG: 100 CAPSULE, LIQUID FILLED ORAL at 20:54

## 2020-09-11 RX ADMIN — Medication 250 ML/HR: at 01:49

## 2020-09-11 RX ADMIN — PHENYLEPHRINE HYDROCHLORIDE 100 MCG: 10 INJECTION INTRAVENOUS at 02:29

## 2020-09-11 RX ADMIN — MIDAZOLAM 2 MG: 1 INJECTION INTRAMUSCULAR; INTRAVENOUS at 02:05

## 2020-09-11 RX ADMIN — OXYCODONE HYDROCHLORIDE AND ACETAMINOPHEN 2 TABLET: 5; 325 TABLET ORAL at 20:55

## 2020-09-11 RX ADMIN — FERROUS SULFATE TAB 325 MG (65 MG ELEMENTAL FE) 325 MG: 325 (65 FE) TAB at 07:34

## 2020-09-11 RX ADMIN — LIDOCAINE HYDROCHLORIDE,EPINEPHRINE BITARTRATE 10 ML: 20; .005 INJECTION, SOLUTION EPIDURAL; INFILTRATION; INTRACAUDAL; PERINEURAL at 01:18

## 2020-09-11 RX ADMIN — ONDANSETRON HYDROCHLORIDE 4 MG: 2 INJECTION, SOLUTION INTRAMUSCULAR; INTRAVENOUS at 01:47

## 2020-09-11 RX ADMIN — SIMETHICONE CHEW TAB 80 MG 80 MG: 80 TABLET ORAL at 15:40

## 2020-09-11 RX ADMIN — KETOROLAC TROMETHAMINE 30 MG: 30 INJECTION, SOLUTION INTRAMUSCULAR; INTRAVENOUS at 01:50

## 2020-09-11 RX ADMIN — FENTANYL CITRATE 100 MCG: 50 INJECTION INTRAMUSCULAR; INTRAVENOUS at 02:05

## 2020-09-11 RX ADMIN — KETOROLAC TROMETHAMINE 30 MG: 30 INJECTION, SOLUTION INTRAMUSCULAR at 13:51

## 2020-09-11 RX ADMIN — LIDOCAINE HYDROCHLORIDE,EPINEPHRINE BITARTRATE 10 ML: 20; .005 INJECTION, SOLUTION EPIDURAL; INFILTRATION; INTRACAUDAL; PERINEURAL at 01:09

## 2020-09-11 RX ADMIN — OXYCODONE HYDROCHLORIDE AND ACETAMINOPHEN 1 TABLET: 5; 325 TABLET ORAL at 07:33

## 2020-09-11 RX ADMIN — OXYCODONE HYDROCHLORIDE AND ACETAMINOPHEN 2 TABLET: 5; 325 TABLET ORAL at 03:20

## 2020-09-11 ASSESSMENT — PAIN SCALES - GENERAL
PAINLEVEL_OUTOF10: 6
PAINLEVEL_OUTOF10: 6
PAINLEVEL_OUTOF10: 5
PAINLEVEL_OUTOF10: 2
PAINLEVEL_OUTOF10: 6

## 2020-09-11 NOTE — FLOWSHEET NOTE
Dr Aaron Peñaloza notified of no cervical changes after repositioning patient and increasing pitocin per orders. MD orders non emergent C/S for FTP. Anesthesia notified and coming in.

## 2020-09-11 NOTE — FLOWSHEET NOTE
Have been repositioning pt every 30 minutes for comfort and to help with labor process. Tried side lying release positions on both sides and have positioned pt on left and right side with peanut ball between legs.

## 2020-09-11 NOTE — PROGRESS NOTES
Pt. Voided and already dumped in toilet unable to measure. Instructed pt. She could shower this afternoon whenever she was ready.

## 2020-09-11 NOTE — OP NOTE
Department of Obstetrics and Gynecology   Section Note    Indications: failure to progress - arrest of dilation    Pre-operative Diagnosis:   1. IUP at 40 2/7 wks  2. Arrest of dilation      Post-operative Diagnosis: Same    Surgeon: Jamar Rhoades     Assistants: Daniel Flynn    Anesthesia: Epidural anesthesia      Procedure Details   The patient was seen in the Holding Room. The risks, benefits, complications, treatment options, and expected outcomes were discussed with the patient. The patient concurred with the proposed plan, giving informed consent. The site of surgery properly noted/marked. The patient was taken to the Operating Room  identified as Creed Litten and the procedure verified as  Delivery. A Time Out was held and the above information confirmed. After induction of anesthesia, the patient was draped and prepped in the usual sterile manner. A Pfannenstiel incision was made and carried down through the subcutaneous tissue to the fascia. Fascial incision was made and extended transversely. The fascia was  from the underlying rectus tissue superiorly and inferiorly. The peritoneum was identified and entered. Peritoneal incision was extended longitudinally. Vesicouterine peritoneum was tented up and entered sharply and extended bilaterally. Bladder flap was created digitally. Bladder blade was placed. A low transverse uterine incision was made. Delivered from direct OP presentation was a 4040 gram male with Apgar scores of 8 at one minute and 9 at five minutes. After the umbilical cord was clamped and cut cord blood was obtained for evaluation. The placenta was removed intact and appeared normal. The uterine outline, tubes and ovaries appeared normal. The uterus was exteriorized and incision was closed with running sutures of 0 Vicryl. A second imbricating layer of the same suture was placed with excellent Hemostasis obtained.   Vesicouterine peritoneum was closed with 3-0 chromic. Posterior and paracolic gutters were cleared of all clots and debris. The uterus was returned to the abdomen. Interceed was placed over the incision. Peritoneum was closed using 2-0 vicryl in a running fashion. Fidel's was closed with 2-0 vicryl  The fascia was then reapproximated with running sutures of 0 Vicryl. The skin was reapproximated with 3-0 monocryl in a subcuticular fashion. Instrument, sponge, and needle counts were correct prior the abdominal closure and at the conclusion of the case. Findings:  Normal uterus with 2 small posterior fibroids. Normal tubes and ovaries bilaterally. Intact placenta with 3-vessel cord. Intake/Output:     Date 09/10/20 0701 - 09/11/20 0700 09/11/20 0701 - 09/12/20 0700   Shift 3203-3027 9089-6518 4188-7788 24 Hour Total 6287-7779 6830-9911 1154-0001 24 Hour Total   INTAKE   I.V. 1000  550 1550       Shift Total 1000  550 1550       OUTPUT   Blood   900 900         EBL (mL)   900 900       Shift Total   900 900       NET 1000  -350 650                Drains: Mckeon                Specimens: None           Implants: Interceed           Complications:  none         Disposition: PACU - hemodynamically stable.            Condition: infant stable to general nursery

## 2020-09-11 NOTE — LACTATION NOTE
Infant Name: Baby Boy   Gestation: 40.3  Day of Life: NB  Birth weight: 8-14.5 lb (4040g)  24 hour summary of feeds:  Voids:  Stools:  Assistive device: none  Maternal History: , hemorrhoids, breast surgery, lip repair,  section  Maternal Medications: PNV  Maternal Goal: one day at a time  Breast pump for home: yes    Assisted mother with positioning and latching baby to right and left breast, football/cross-cradle. Baby had a wide open mouth with each latch, chin and cheeks touching breast, nose free to breathe. Some jaw dropping sucks noted with gentle stimulation. Baby on and off latch, hand expression done each time, colostrum easily expressed. Instructed mother to breastfeed every 2- 3 hours for 15-20 mins each side or on demand watching for hunger cues and using waking techniques when needed. 8-12 feedings in 24 hours being the goal. Hand expression and breast compressions encouraged to increase milk supply and transfer. Discussed the benefits of colostrum, skin to skin and the importance of good positioning and latch. Informed mother that baby can be very sleepy the first 24 hours and typically the 2nd night babies will be more awake and want to feed a lot and that this is normal and important in establishing milk supply. Discussed supply and demand. Mother and baby will possible be discharged over the weekend, lactation will not be here. Instructions and handouts given over management of sore nipples, engorgement, plugged ducts, mastitis, hydration, nutrition, and medications that could effect milk supply. Mother knows when to call MD if needed. Encouraged mother to call out for help with feedings if needed. All questions answered. Lactation number provided.

## 2020-09-11 NOTE — FLOWSHEET NOTE
Dr Francis Rivera updated on pt status. Orders rec'd to allow pitocin to be increased to 30 mu/min prn. No further orders received.

## 2020-09-11 NOTE — FLOWSHEET NOTE
BETTY Weller went to assist patient to stand for first time after c/section. Upon entering room, patient was standing at bedside with . Patient was assisted to bed for catheter removal.  Patient wanted to go to bathroom to clean up. Patient ambulated with no assistance to bathroom. Discussion with patient about proper pericare. Peribottle and linens provided. Patient instructed to not shower at this time. Patient instructed to call out if needed. Patient in bathroom;  awake in recliner up leaving room.

## 2020-09-11 NOTE — FLOWSHEET NOTE
Assumed care of pt. Pt resting quietly with eyes closed and lights off. Significant other supportive at bedside. IV infusing without difficulty, site clear. SVE completed. Positioned pt to right side lying position left leg in stirrup. Zofran given for C/O nausea. Denies further needs at this time.

## 2020-09-12 PROBLEM — M54.50 LOW BACK PAIN: Status: RESOLVED | Noted: 2020-09-09 | Resolved: 2020-09-12

## 2020-09-12 PROCEDURE — 6370000000 HC RX 637 (ALT 250 FOR IP): Performed by: ADVANCED PRACTICE MIDWIFE

## 2020-09-12 PROCEDURE — 1220000000 HC SEMI PRIVATE OB R&B

## 2020-09-12 PROCEDURE — 6370000000 HC RX 637 (ALT 250 FOR IP): Performed by: OBSTETRICS & GYNECOLOGY

## 2020-09-12 PROCEDURE — 99233 SBSQ HOSP IP/OBS HIGH 50: CPT | Performed by: ADVANCED PRACTICE MIDWIFE

## 2020-09-12 RX ORDER — HYDROCORTISONE 25 MG/G
CREAM TOPICAL 2 TIMES DAILY
Status: DISCONTINUED | OUTPATIENT
Start: 2020-09-12 | End: 2020-09-13 | Stop reason: HOSPADM

## 2020-09-12 RX ADMIN — OXYCODONE HYDROCHLORIDE AND ACETAMINOPHEN 1 TABLET: 5; 325 TABLET ORAL at 13:00

## 2020-09-12 RX ADMIN — IBUPROFEN 800 MG: 400 TABLET ORAL at 17:10

## 2020-09-12 RX ADMIN — OXYCODONE HYDROCHLORIDE AND ACETAMINOPHEN 1 TABLET: 5; 325 TABLET ORAL at 19:05

## 2020-09-12 RX ADMIN — DOCUSATE SODIUM 100 MG: 100 CAPSULE, LIQUID FILLED ORAL at 08:26

## 2020-09-12 RX ADMIN — PRENATAL VIT W/ FE FUMARATE-FA TAB 27-0.8 MG 1 TABLET: 27-0.8 TAB at 08:26

## 2020-09-12 RX ADMIN — IBUPROFEN 800 MG: 400 TABLET ORAL at 06:48

## 2020-09-12 RX ADMIN — DOCUSATE SODIUM 100 MG: 100 CAPSULE, LIQUID FILLED ORAL at 22:08

## 2020-09-12 RX ADMIN — HYDROCORTISONE: 25 CREAM TOPICAL at 22:06

## 2020-09-12 ASSESSMENT — PAIN SCALES - GENERAL
PAINLEVEL_OUTOF10: 7
PAINLEVEL_OUTOF10: 7
PAINLEVEL_OUTOF10: 6
PAINLEVEL_OUTOF10: 6

## 2020-09-12 NOTE — PROGRESS NOTES
[facility]) OB/GYN  Progress Note    Subjective:     Postpartum Day 1:  Delivery    Patient is a  that delivered on The patient feels well. The patient denies emotional concerns. Pain is well controlled with current medications. The baby iswell. Baby is feeding via breast. Urinary output is adequate. The patient is ambulating well. The patient is tolerating a normal diet. Flatus has been passed. Objective:      Patient Vitals for the past 8 hrs:  Vitals:    20 0646   BP: 101/64   Pulse: 73   Resp:    Temp: 98.7 °F (37.1 °C)   SpO2: 99%       General:    alert, appears stated age and cooperative   Bowel Sounds:  active   Lochia:  appropriate   Uterine Fundus:   firm   Incision:  healing well, no significant drainage, no dehiscence, no significant erythema   DVT Evaluation:  No evidence of DVT seen on physical exam.     Lab Results   Component Value Date    WBC 19.9 (H) 2020    HGB 12.3 2020    HCT 35.4 (L) 2020    MCV 94.4 2020     2020     Assessment:     Status post  section. Doing well postoperatively. Plan:     Continue current care.

## 2020-09-13 PROCEDURE — 6370000000 HC RX 637 (ALT 250 FOR IP): Performed by: OBSTETRICS & GYNECOLOGY

## 2020-09-13 PROCEDURE — 99239 HOSP IP/OBS DSCHRG MGMT >30: CPT | Performed by: ADVANCED PRACTICE MIDWIFE

## 2020-09-13 PROCEDURE — 6370000000 HC RX 637 (ALT 250 FOR IP): Performed by: ADVANCED PRACTICE MIDWIFE

## 2020-09-13 RX ORDER — OXYCODONE HYDROCHLORIDE AND ACETAMINOPHEN 5; 325 MG/1; MG/1
1 TABLET ORAL EVERY 4 HOURS PRN
Qty: 18 TABLET | Refills: 0 | Status: SHIPPED | OUTPATIENT
Start: 2020-09-13 | End: 2020-09-16

## 2020-09-13 RX ORDER — IBUPROFEN 800 MG/1
800 TABLET ORAL EVERY 8 HOURS PRN
Qty: 60 TABLET | Refills: 0 | Status: SHIPPED | OUTPATIENT
Start: 2020-09-13 | End: 2020-09-21

## 2020-09-13 RX ADMIN — IBUPROFEN 800 MG: 400 TABLET ORAL at 08:23

## 2020-09-13 RX ADMIN — BENZOCAINE AND LEVOMENTHOL: 200; 5 SPRAY TOPICAL at 08:23

## 2020-09-13 RX ADMIN — PRENATAL VIT W/ FE FUMARATE-FA TAB 27-0.8 MG 1 TABLET: 27-0.8 TAB at 08:23

## 2020-09-13 RX ADMIN — DOCUSATE SODIUM 100 MG: 100 CAPSULE, LIQUID FILLED ORAL at 08:23

## 2020-09-13 ASSESSMENT — PAIN SCALES - GENERAL: PAINLEVEL_OUTOF10: 5

## 2020-09-13 NOTE — DISCHARGE SUMMARY
[facility]   Discharge Summary    Patient Name: Georgina Toro  Patient : 1985  Room/Bed: 0216/0216-01  Primary Care Physician: Krunal Gonzalez Date: 2020  1:26 PM    Reasons for Admission on 2020  1:26 PM  Low back pain [M54.5]  No comment available  Induction of Labor    Patient Active Problem List   Diagnosis    External hemorrhoids    Failure to progress in labor    Post-term pregnancy, 40-42 weeks of gestation    S/P primary low transverse        Georgina Toro is a 36y.o. year old  who presented at 44w3d gestation with No chief complaint on file. . Her pregnancy has been uncomplicated   Please see H&P for detailed information. She was admitted and progressed in labor with pitocin augmentation and Epidural anesthesia. Failure to progress identified and primary c/s performed. Please see procedure note for detailed information. Her postpartum course has been unremarkable. See H&H history below. She had no signs or symptoms of acute blood loss anemia. She is ambulating well, voiding without difficulty and her lochia is within normal limits. She is feeding by breast and bottle without difficulty. She was stable for discharge on postpartum day 2. Hemoglobin   Date Value Ref Range Status   2020 12.3 12.0 - 16.0 g/dL Final   2020 13.5 12.0 - 16.0 g/dL Final     Hematocrit   Date Value Ref Range Status   2020 35.4 (L) 37.0 - 47.0 % Final   2020 38.0 37.0 - 47.0 % Final         Prenatal Procedures  None    Intrapartum Procedures  Spontaneous Vaginal Delivery: See Labor and Delivery Summary    Postpartum Procedures  None     Data  Information for the patient's :  Lawrence Catalan [[id number]]   male   Birth Weight: 8 lb 14.5 oz (4.04 kg)         Postpartum Day 2:       REVIEW OF SYSTEMS  Patient is a  The patient feels well. The patient denies emotional concerns.  Pain is well controlled with current medications. The baby iswell. Baby is feeding via breast. Urinary output is adequate. The patient is ambulating well. The patient is tolerating a normal diet. PHYSICAL EXAM     /70   Pulse 70   Temp 98 °F (36.7 °C)   Resp 18   Ht 5' 2\" (1.575 m)   Wt 162 lb (73.5 kg)   LMP 11/15/2019   SpO2 98%   Breastfeeding Unknown   BMI 29.63 kg/m²   General:    alert, appears stated age and cooperative   Breast:  Soft, non-tender   Bowel Sounds:  active   Lochia:  appropriate   Uterine Fundus:   firm   Episiotomy/lac:  healing well, no significant drainage, no dehiscence, no significant erythema   DVT Evaluation:  No evidence of DVT seen on physical exam.     Lab Results   Component Value Date    WBC 19.9 (H) 09/11/2020    HGB 12.3 09/11/2020    HCT 35.4 (L) 09/11/2020    MCV 94.4 09/11/2020     09/11/2020         DISCHARGE DIAGNOSIS:  Primary c/s    Discharge Information/Patient Instructions:   Erna Nick   [place] Medication Instructions DENISE:[id number]    Printed on:09/13/20 1115   Medication Information                      hydrocortisone (ANUSOL-HC) 25 MG suppository  Place 1 suppository rectally every 12 hours             ibuprofen (ADVIL;MOTRIN) 800 MG tablet  Take 1 tablet by mouth every 8 hours as needed for Pain             oxyCODONE-acetaminophen (PERCOCET) 5-325 MG per tablet  Take 1 tablet by mouth every 4 hours as needed for Pain for up to 3 days. Xjyxtu-IdJiy-Gjrfn-FA-DHA w/oA (VITAPEARL) 30-1.4-200 MG CPCR  Take 1 tablet by mouth daily                 No discharge procedures on file. Activity & Diet: Precautions and instructions were discussed with her including but not limited to maintaining a regular diet at home, practicing local hygiene, pelvic rest, and signs and symptoms to report including heavy bleeding, frequent passage of clots, fainting or dizziness, foul odor of lochia, increased pain, fever, or any other concerns.      Wound Care: N/A    Discharge to: Home in stable condition  She was asked to follow up in the office in 2 weeks. Discharge Date: 9/13/2020      SHAYNE Toney CNM      Comments: Follow-up care and birth control were reviewed. Signs and symptoms of mastitis and Post Partum Depression were reviewed. The patient is to notify her physician if any of these occur. The patient was counseled on secondary smoke risks and the increased risk of sudden infant death syndrome and respiratory problems to her baby with exposure. She was counseled on various alternate recommendations to decrease the exposure to secondary smoke to her children. Total time spent on discharge and coordination of care is >30 minutes. All questions were answered and the patient voiced understanding.

## 2020-09-15 ENCOUNTER — HOSPITAL ENCOUNTER (OUTPATIENT)
Dept: LABOR AND DELIVERY | Age: 35
Discharge: HOME | End: 2020-09-15
Payer: MEDICAID

## 2020-09-15 PROCEDURE — S9443 LACTATION CLASS: HCPCS

## 2020-09-17 ENCOUNTER — HOSPITAL ENCOUNTER (OUTPATIENT)
Dept: LABOR AND DELIVERY | Age: 35
Discharge: HOME | End: 2020-09-17
Payer: MEDICAID

## 2020-09-17 PROCEDURE — S9443 LACTATION CLASS: HCPCS

## 2020-09-21 ENCOUNTER — POSTPARTUM VISIT (OUTPATIENT)
Dept: OBGYN | Age: 35
End: 2020-09-21
Payer: MEDICAID

## 2020-09-21 VITALS
DIASTOLIC BLOOD PRESSURE: 78 MMHG | BODY MASS INDEX: 24.75 KG/M2 | WEIGHT: 145 LBS | HEIGHT: 64 IN | SYSTOLIC BLOOD PRESSURE: 121 MMHG | HEART RATE: 78 BPM

## 2020-09-21 PROCEDURE — 99213 OFFICE O/P EST LOW 20 MIN: CPT | Performed by: OBSTETRICS & GYNECOLOGY

## 2020-09-21 PROCEDURE — S3005 EVAL SELF-ASSESS DEPRESSION: HCPCS | Performed by: OBSTETRICS & GYNECOLOGY

## 2020-09-21 ASSESSMENT — ENCOUNTER SYMPTOMS
GASTROINTESTINAL NEGATIVE: 1
RESPIRATORY NEGATIVE: 1
EYES NEGATIVE: 1

## 2020-09-21 NOTE — PROGRESS NOTES
Janyth Koyanagi is a 36 y.o.  who presents today for her 2 week postpartum visit following a c section on 20. Pt states her hemorrhoids are very painful, states she had them before surgery, was supposed to have surgery but did not. Would like a referral to Gen Surg. Review of Systems   Constitutional: Negative. HENT: Negative. Eyes: Negative. Respiratory: Negative. Cardiovascular: Negative. Gastrointestinal: Negative. Genitourinary: Negative. Negative for dysuria, frequency, menstrual problem, pelvic pain, urgency and vaginal discharge. Skin: Negative. Neurological: Negative. Psychiatric/Behavioral: Negative.         Past Medical History:   Diagnosis Date    Hemorrhoids        Past Surgical History:   Procedure Laterality Date    BREAST SURGERY      implants     SECTION N/A 2020     SECTION performed [facility] at [facility]    LIP REPAIR         Family History   Problem Relation Age of Onset    Colon Cancer Neg Hx     Colon Polyps Neg Hx     Esophageal Cancer Neg Hx     Liver Cancer Neg Hx     Liver Disease Neg Hx     Rectal Cancer Neg Hx     Stomach Cancer Neg Hx        Social History     Socioeconomic History    Marital status:      Spouse name: Not on file    Number of children: Not on file    Years of education: Not on file    Highest education level: Not on file   Occupational History    Not on file   Social Needs    Financial resource strain: Not on file    Food insecurity     Worry: Not on file     Inability: Not on file   [employer] needs     Medical: Not on file     Non-medical: Not on file   Tobacco Use    Smoking status: Never Smoker    Smokeless tobacco: Never Used   Substance and Sexual Activity    Alcohol use: No    Drug use: No    Sexual activity: Yes     Partners: Male   Lifestyle    Physical activity     Days per week: Not on file     Minutes per session: Not on file    Stress: Not

## 2020-09-21 NOTE — PATIENT INSTRUCTIONS
Patient Education        Depression After Childbirth: Care Instructions  Your Care Instructions     Many women get the \"baby blues\" during the first few days after childbirth. You may lose sleep, feel irritable, and cry easily. You may feel happy one minute and sad the next. Hormone changes are one cause of these emotional changes. Also, the demands of a new baby, along with visits from relatives or other family needs, add to a mother's stress. The \"baby blues\" often peak around the fourth day. Then they ease up in less than 2 weeks. If your moodiness or anxiety lasts for more than 2 weeks, or if you feel like life is not worth living, you may have postpartum depression. This is different for each mother. Some mothers with serious depression may worry intensely about their infant's well-being. Others may feel distant from their child. Some mothers might even feel that they might harm their baby. A mother may have signs of paranoia, wondering if someone is watching her. Depression is not a sign of weakness. It is a medical condition that requires treatment. Medicine and counseling often work well to reduce depression. Talk to your doctor about taking antidepressant medicine while breastfeeding. Follow-up care is a key part of your treatment and safety. Be sure to make and go to all appointments, and call your doctor if you are having problems. It's also a good idea to know your test results and keep a list of the medicines you take. How do you know if you are depressed? With all the changes in your life, you may not know if you are depressed. Pregnancy sometimes causes changes in how you feel that are similar to the symptoms of depression. Symptoms of depression include:  · Feeling sad or hopeless and losing interest in daily activities. These are the most common symptoms of depression. · Sleeping too much or not enough. · Feeling tired. You may feel as if you have no energy.   · Eating too much or too yourself or your baby. · You hear voices. Watch closely for changes in your health, and be sure to contact your doctor if:  · You have problems with your depression medicine. · You do not get better as expected. Where can you learn more? Go to https://chpeanteb.[email or web address] org and sign in to your "[employer] account. Enter F410 in the [employer] box to learn more about \"Depression After Childbirth: Care Instructions. \"     If you do not have an account, please click on the \"Sign Up Now\" link. Current as of: January 31, 2020               Content Version: 12.5  © 4272-5535 Healthwise, Incorporated. Care instructions adapted under license by [address] If you have questions about a medical condition or this instruction, always ask your healthcare professional. [address] any warranty or liability for your use of this information.

## 2020-09-22 VITALS
HEIGHT: 62 IN | HEART RATE: 70 BPM | DIASTOLIC BLOOD PRESSURE: 70 MMHG | RESPIRATION RATE: 18 BRPM | BODY MASS INDEX: 29.81 KG/M2 | SYSTOLIC BLOOD PRESSURE: 120 MMHG | WEIGHT: 162 LBS | TEMPERATURE: 98 F | OXYGEN SATURATION: 98 %

## 2020-09-22 NOTE — ANESTHESIA POSTPROCEDURE EVALUATION
Department of Anesthesiology  Postprocedure Note    Patient: Valentina Dey  MRN: [id number]  YOB: 1985  Date of evaluation: 2020  Time:  2:07 PM     Procedure Summary     Date:  09/10/20 Room / Location:  [facility]    Anesthesia Start:  173 Anesthesia Stop:  20 0245    Procedures:        SECTION (N/A Abdomen)      Labor Analgesia Diagnosis:  (Failure to Progress)    Surgeon:  Crissy Silva MD Responsible Provider:  SHAYNE Evans CRNA    Anesthesia Type:  epidural ASA Status:  2          Anesthesia Type: epidural    Cory Phase I: Cory Score: 9    Cory Phase II: Cory Score: 10    Last vitals: Reviewed and per EMR flowsheets.        Anesthesia Post Evaluation    Patient location during evaluation: bedside  Patient participation: complete - patient participated  Level of consciousness: awake and alert  Pain score: 0  Airway patency: patent  Nausea & Vomiting: no nausea and no vomiting  Complications: no  Cardiovascular status: hemodynamically stable and blood pressure returned to baseline  Respiratory status: acceptable and room air  Hydration status: stable

## 2020-09-24 ENCOUNTER — OFFICE VISIT (OUTPATIENT)
Dept: SURGERY | Age: 35
End: 2020-09-24
Payer: MEDICAID

## 2020-09-24 VITALS
WEIGHT: 146.8 LBS | TEMPERATURE: 98.3 F | HEIGHT: 62 IN | SYSTOLIC BLOOD PRESSURE: 100 MMHG | DIASTOLIC BLOOD PRESSURE: 60 MMHG | BODY MASS INDEX: 27.02 KG/M2

## 2020-09-24 PROCEDURE — 99202 OFFICE O/P NEW SF 15 MIN: CPT | Performed by: SURGERY

## 2020-09-24 ASSESSMENT — ENCOUNTER SYMPTOMS
SORE THROAT: 0
ABDOMINAL DISTENTION: 0
CHEST TIGHTNESS: 0
NAUSEA: 0
EYE PAIN: 0
ABDOMINAL PAIN: 0
COLOR CHANGE: 0
SHORTNESS OF BREATH: 0
BACK PAIN: 0
EYE REDNESS: 0
DIARRHEA: 0
CONSTIPATION: 1
COUGH: 0
VOMITING: 0

## 2020-09-24 NOTE — LETTER
[facility] General Surgery  [address]  Phone: [phone]  Fax: [phone] [facility] Katrina , DO        September 24, 2020     [address]    Patient: Lilia Orantes  MR Number: [id number]  YOB: 1985  Date of Visit: 9/24/2020    Dear Dr. Jessica Antonio: Thank you for the request for consultation for Lilia Orantes to me for the evaluation of her hemorrhoids. We are attempting improvement with sitz baths and a good bowel regimen prior to proceeding with hemorrhoidectomy. Thank you for this referral. Below are the relevant portions of my assessment and plan of care. If you have questions, please do not hesitate to call me. I look forward to following [facility] along with you.     Sincerely,        Darshan Marcos, DO

## 2020-09-24 NOTE — PROGRESS NOTES
SUBJECTIVE:  Ms. Alissa Vallejo is a 36 y.o. female who presents today with complaints of pain at her rectum for several years, since she was 13. She is a power-, thus frequently lifts very heavy weight. She recently too was pregnant and is presently two weeks post-partum. She notes some blood on the tissue when she would wipe from time to time in the past. She is presently still having some vaginal bleeding from her birth. She states that her hemorrhoids have become very large in the past and exquisitely tender, but that presently they are controlled. Past Medical History:   Diagnosis Date    Hemorrhoids      Past Surgical History:   Procedure Laterality Date    BREAST SURGERY      implants     SECTION N/A 2020     SECTION performed by Lidia Contreras MD at [facility] L[facility]D OR    LIP REPAIR       Current Outpatient Medications   Medication Sig Dispense Refill    hydrocortisone (ANUSOL-HC) 25 MG suppository Place 1 suppository rectally every 12 hours 12 suppository 0    Vvjver-LfLfh-Wioxa-FA-DHA w/oA (VITAPEARL) 30-1.4-200 MG CPCR Take 1 tablet by mouth daily 30 capsule 5     No current facility-administered medications for this visit. Allergies: Patient has no known allergies. Family History   Problem Relation Age of Onset    Colon Cancer Neg Hx     Colon Polyps Neg Hx     Esophageal Cancer Neg Hx     Liver Cancer Neg Hx     Liver Disease Neg Hx     Rectal Cancer Neg Hx     Stomach Cancer Neg Hx        Social History     Tobacco Use    Smoking status: Never Smoker    Smokeless tobacco: Never Used   Substance Use Topics    Alcohol use: No       Review of Systems   Constitutional: Negative for fatigue, fever and unexpected weight change. HENT: Negative for hearing loss, nosebleeds and sore throat. Eyes: Negative for pain, redness and visual disturbance. Respiratory: Negative for cough, chest tightness and shortness of breath.     Cardiovascular: Negative for chest pain, palpitations and leg swelling. Gastrointestinal: Positive for constipation. Negative for abdominal distention, abdominal pain, diarrhea, nausea and vomiting. Endocrine: Negative for cold intolerance, heat intolerance and polydipsia. Genitourinary: Negative for difficulty urinating, frequency and urgency. Musculoskeletal: Negative for back pain, joint swelling and neck pain. Skin: Negative for color change, rash and wound. Allergic/Immunologic: Negative for environmental allergies and food allergies. Neurological: Negative for seizures, light-headedness and headaches. Hematological: Negative for adenopathy. Does not bruise/bleed easily. Psychiatric/Behavioral: Negative for confusion, sleep disturbance and suicidal ideas. OBJECTIVE:  /60 (Site: Right Upper Arm, Position: Sitting, Cuff Size: Medium Adult)   Temp 98.3 °F (36.8 °C) (Temporal)   Ht 5' 2\" (1.575 m)   Wt 146 lb 12.8 oz (66.6 kg)   BMI 26.85 kg/m²   Physical Exam  Vitals signs reviewed. Exam conducted with a chaperone present. Constitutional:       Appearance: She is well-developed. HENT:      Head: Normocephalic and atraumatic. Eyes:      Pupils: Pupils are equal, round, and reactive to light. Neck:      Musculoskeletal: Normal range of motion. Cardiovascular:      Rate and Rhythm: Normal rate and regular rhythm. Heart sounds: Normal heart sounds. Pulmonary:      Effort: Pulmonary effort is normal.      Breath sounds: Normal breath sounds. No wheezing or rales. Abdominal:      General: A surgical scar is present. Bowel sounds are normal. There is no distension. Palpations: Abdomen is soft. Tenderness: There is no abdominal tenderness. There is no guarding or rebound. Genitourinary:     Exam position: Knee-chest position. Comments: External soft hemorrhoidal tags, some tenderness. TELMA: Very tight sphincter tone. Musculoskeletal: Normal range of motion.    Lymphadenopathy: Cervical: No cervical adenopathy. Skin:     General: Skin is warm and dry. Neurological:      Mental Status: She is alert and oriented to person, place, and time. Deep Tendon Reflexes: Reflexes are normal and symmetric. Psychiatric:         Behavior: Behavior normal.         Thought Content: Thought content normal.         Judgment: Judgment normal.         ASSESSMENT:   Diagnosis Orders   1. External hemorrhoids         PLAN:    Discussed with patient that she should perform sitz baths TID to QID to soften her sphincter muscle. Reviewed bowel regimen recommendations. Will see in one month to see if she has any improvement. Return in about 1 month (around 10/24/2020).     Mikey Brito DO 9/66/2960 2:51 PM

## 2020-09-27 NOTE — H&P
Any Mendoza is a 36 y.o. female patient who presents with c/o pain and contractions overnight. Past Medical History:   Diagnosis Date    Hemorrhoids      OB History as of 2020        1    Para   1    Term   1            AB        Living   1       SAB        TAB        Ectopic        Molar        Multiple   0    Live Births   1              40w3d  Estimated Date of Delivery: 20  No Known Allergies  Active Problems:    Failure to progress in labor    Post-term pregnancy, 40-42 weeks of gestation    S/P primary low transverse   Resolved Problems:    Low back pain    Blood pressure 120/70, pulse 70, temperature 98 °F (36.7 °C), resp. rate 18, height 5' 2\" (1.575 m), weight 162 lb (73.5 kg), last menstrual period 11/15/2019, SpO2 98 %, unknown if currently breastfeeding. Maternal Medical History:   Contractions: Onset was 13-24 hours ago. Fetal activity: Perceived fetal activity is normal.      Prenatal complications: no prenatal complications  Prenatal Complications - Diabetes: none. Maternal Exam:   Uterine Assessment: Contraction strength is moderate. Contraction frequency is irregular. Abdomen: Patient reports no abdominal tenderness. Fetal presentation: vertex    Introitus: Normal vulva. Normal vagina. Pelvis: adequate for delivery. Fetal Exam  Fetal Monitor Review: Mode: ultrasound. Variability: moderate (6-25 bpm). Pattern: accelerations present and no decelerations. Fetal State Assessment: Category I - tracings are normal.          Assessment:  Membrane status: intact.    Fetal well-being: normal.   IUP at term    Plan:  Admit for IOL  Routine admission labs and intrapartum care  Analgesia per patient request      Dean Singh MD  2020

## 2020-10-12 ENCOUNTER — OFFICE VISIT (OUTPATIENT)
Dept: OTOLARYNGOLOGY | Facility: CLINIC | Age: 35
End: 2020-10-12

## 2020-10-12 VITALS — SYSTOLIC BLOOD PRESSURE: 135 MMHG | TEMPERATURE: 97.6 F | DIASTOLIC BLOOD PRESSURE: 88 MMHG | HEART RATE: 90 BPM

## 2020-10-12 DIAGNOSIS — J34.2 NASAL SEPTAL DEVIATION: ICD-10-CM

## 2020-10-12 DIAGNOSIS — L90.5 SCAR: ICD-10-CM

## 2020-10-12 DIAGNOSIS — M95.0 NASAL DEFORMITY, ACQUIRED: ICD-10-CM

## 2020-10-12 DIAGNOSIS — J34.89 NASAL VALVE STENOSIS: ICD-10-CM

## 2020-10-12 DIAGNOSIS — I78.1 CAPILLARY HEMANGIOMA: Primary | ICD-10-CM

## 2020-10-12 PROCEDURE — 99214 OFFICE O/P EST MOD 30 MIN: CPT | Performed by: OTOLARYNGOLOGY

## 2020-10-12 RX ORDER — PRENATAL VIT NO.126/IRON/FOLIC 28MG-0.8MG
TABLET ORAL DAILY
COMMUNITY
End: 2021-04-12

## 2020-10-12 NOTE — PROGRESS NOTES
PRIMARY CARE PROVIDER: Mary Urias MD  REFERRING PROVIDER: No ref. provider found    Chief Complaint   Patient presents with   • capillary Hemangioma     Bleeding again last week  Pt feels like it is getting infected       Subjective   History of Present Illness:  Luh Tapia is a  34 y.o. female who complains of a possible recurrent meningioma of her upper lip.  She is 1 month postpartum.  She reports that during her pregnancy, she felt fullness in her upper lip, anterior palate, and intranasally.  This has persisted.  It has gotten to the point where her upper lip fullness has begin to cause weeping of clear fluid from the upper lip.  This is moderate in intensity and intermittent.  She also reports associated purple discoloration approximately 2 weeks ago to her upper lip.  The fullness is moderate, and has been present for a month.  Nothing makes this better, nor worse.    When seen on December 30, 2019, she had an open wound following manipulation of the lip during a dental appointment with Dr. Williamson.  At that time she had a 1 to 2 mm area of raised erythematous lip with overlying bloody crusting consistent with possible pyogenic granuloma.  This was then removed under local with iris scissors and cauterized with silver nitrate.  I placed her on Amoxil and plan to see her back in 2 to 3 weeks.  We discussed possible excision and wound closure if this did not help her symptoms.    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 She is status post recontouring of the upper lip on March 1, 2019.      When she presented for my initial evaluation on August 23, 2017, the following was the history:  She states this area has been excised 3 times in [place] prior to the age of 13. She was told this was a hemangioma. Dr. Lazo in [place] excised this area again in 2003 removing scarring. She states that Dr. Lazo accidentally incised the white lip in vertical  fashion.    Review of Systems:  Review of Systems   Constitutional: Negative for chills, fatigue, fever and unexpected weight change.   HENT: Negative for facial swelling.    Respiratory: Negative for cough, chest tightness and shortness of breath.    Cardiovascular: Negative for chest pain.   Musculoskeletal: Negative for neck pain.   Skin: Positive for color change and wound.   Neurological: Positive for facial asymmetry.   Hematological: Negative for adenopathy. Does not bruise/bleed easily.       Past History:  Past Medical History:   Diagnosis Date   • Hemangioma of lip 2017    Right Upper Lip   • PONV (postoperative nausea and vomiting)     EXTREME     Past Surgical History:   Procedure Laterality Date   • BREAST SURGERY Bilateral     IMPLANTS    •  SECTION     • HEAD/NECK LESION/CYST EXCISION Bilateral 2017    Procedure: Excision of neoplasm of uncertain behavior of the upper lip with linear closure (68674);  Surgeon: Chris Ham MD;  Location: [facility];  Service:    • LIP REPAIR      OVER LAST 26 YEARS HAS HAD 4 OTHER SURGERIES ON LIP   • OTHER SURGICAL HISTORY      Lip     Family History   Problem Relation Age of Onset   • No Known Problems Mother    • No Known Problems Father      Social History     Tobacco Use   • Smoking status: Never Smoker   • Smokeless tobacco: Never Used   Substance Use Topics   • Alcohol use: No   • Drug use: No     Allergies:  Patient has no known allergies.    Current Outpatient Medications:   •  prenatal vitamin (prenatal, CLASSIC, vitamin) tablet, Take  by mouth Daily., Disp: , Rfl:   •  hydrOXYzine (ATARAX) 25 MG tablet, , Disp: , Rfl:   •  loratadine (CLARITIN) 10 MG tablet, Take 10 mg by mouth Daily., Disp: , Rfl:   •  ondansetron (ZOFRAN) 8 MG tablet, Take 1 tablet by mouth Every 8 (Eight) Hours As Needed for Nausea or Vomiting., Disp: 8 tablet, Rfl: 0      Objective     Vital Signs:  Temp:  [97.6 °F (36.4 °C)] 97.6 °F (36.4 °C)  Heart Rate:  [90]  90  BP: (135)/(88) 135/88    Physical Exam:  Physical Exam  Vitals signs and nursing note reviewed.   Constitutional:       General: She is not in acute distress.     Appearance: She is well-developed. She is not diaphoretic.   HENT:      Head: Normocephalic and atraumatic.      Right Ear: External ear normal.      Left Ear: External ear normal.      Nose: Septal deviation, mucosal edema and rhinorrhea present.      Mouth/Throat:     Eyes:      General: No scleral icterus.        Right eye: No discharge.         Left eye: No discharge.      Conjunctiva/sclera: Conjunctivae normal.      Pupils: Pupils are equal, round, and reactive to light.   Neck:      Musculoskeletal: Normal range of motion and neck supple.      Thyroid: No thyromegaly.      Vascular: No JVD.      Trachea: No tracheal deviation.   Pulmonary:      Effort: Pulmonary effort is normal.      Breath sounds: No stridor.   Musculoskeletal: Normal range of motion.         General: No deformity.   Lymphadenopathy:      Cervical: No cervical adenopathy.   Skin:     General: Skin is warm and dry.      Coloration: Skin is not pale.      Findings: No erythema or rash.   Neurological:      Mental Status: She is alert and oriented to person, place, and time.      Cranial Nerves: No cranial nerve deficit.      Coordination: Coordination normal.   Psychiatric:         Speech: Speech normal.         Behavior: Behavior normal. Behavior is cooperative.         Thought Content: Thought content normal.         Judgment: Judgment normal.         Assessment   Assessment:  1. Capillary hemangioma    2. Scar    3. Nasal valve stenosis    4. Nasal septal deviation    5. Nasal deformity, acquired        Plan   Plan:  I am concerned that she is experiencing a recurrence or flareup of her hemangioma.  There appears to be growth within the upper lip and potentially some palatal involvement, and potentially some nasal involvement which could be going through the incisive canal.   Due to the involvement in multiple areas of V2 distribution, I have offered a potential evaluation for potential treatment in neuro interventional radiology.  This may be something that could be diagnosed and potentially treated with superselective embolization, but I cannot promise this.  I spoke with our radiology department, who felt that there is no utility in performing a CTA/V prior to that referral.  I have called and left a message at [facility] Department of radiology to see if this is something we could coordinate, or whether she needs to be referred to ENT or plastics prior to evaluation and neuro interventional radiology.  We will try again tomorrow to contact radiology, as they are closed at this time.      Patient Instructions        CONTACT INFORMATION:  The main office phone number is [phone]. For emergencies after hours and on weekends, this number will convert over to our answering service and the on call provider will answer. Please try to keep non emergent phone calls/ questions to office hours 9am-5pm Monday through Friday.     [employer]  As an alternative, you can sign up and use the Epic MyChart system for more direct and quicker access for non emergent questions/ problems.  [email or web address] allows you to send messages to your doctor, view your test results, renew your prescriptions, schedule appointments, and more. To sign up, go to [employer] and click on the Sign Up Now link in the New User? box. Enter your [employer] Activation Code exactly as it appears below along with the last four digits of your Social Security Number and your Date of Birth () to complete the sign-up process. If you do not sign up before the expiration date, you must request a new code.    [employer] Activation Code: [email or web address]  Expires: 2020 11:52 AM    If you have questions, you can email [email or web address] or call [phone] to talk to our [employer] staff.  Remember, MyChart is NOT to be used for urgent needs. For medical emergencies, dial 911.          My findings and recommendations were discussed and questions were answered.     Chris Ham MD  10/12/20  16:44 CDT

## 2020-10-12 NOTE — PATIENT INSTRUCTIONS
CONTACT INFORMATION:  The main office phone number is [phone]. For emergencies after hours and on weekends, this number will convert over to our answering service and the on call provider will answer. Please try to keep non emergent phone calls/ questions to office hours 9am-5pm Monday through Friday.     [employer]  As an alternative, you can sign up and use the Epic MyChart system for more direct and quicker access for non emergent questions/ problems.  [facility] [employer] allows you to send messages to your doctor, view your test results, renew your prescriptions, schedule appointments, and more. To sign up, go to [employer] and click on the Sign Up Now link in the New User? box. Enter your [employer] Activation Code exactly as it appears below along with the last four digits of your Social Security Number and your Date of Birth () to complete the sign-up process. If you do not sign up before the expiration date, you must request a new code.    [employer] Activation Code: [email or web address]  Expires: 2020 11:52 AM    If you have questions, you can email [email or web address] or call [phone] to talk to our [employer] staff. Remember, [employer] is NOT to be used for urgent needs. For medical emergencies, dial 911.

## 2020-10-19 ENCOUNTER — POSTPARTUM VISIT (OUTPATIENT)
Dept: OBGYN | Age: 35
End: 2020-10-19
Payer: MEDICAID

## 2020-10-19 VITALS
WEIGHT: 142 LBS | BODY MASS INDEX: 26.13 KG/M2 | HEIGHT: 62 IN | SYSTOLIC BLOOD PRESSURE: 97 MMHG | DIASTOLIC BLOOD PRESSURE: 66 MMHG

## 2020-10-19 PROCEDURE — 99214 OFFICE O/P EST MOD 30 MIN: CPT | Performed by: OBSTETRICS & GYNECOLOGY

## 2020-10-19 ASSESSMENT — ENCOUNTER SYMPTOMS
RESPIRATORY NEGATIVE: 1
EYES NEGATIVE: 1
GASTROINTESTINAL NEGATIVE: 1

## 2020-10-19 NOTE — PROGRESS NOTES
Jamel Carrizales is a 36 y.o. who presents today for pap smear and 6 week postpartum exam following c section on 20. Pt continues to breast feed, and is supplementing. Pt is considering contraception, she states she does not want any more children. Review of Systems   Constitutional: Negative. HENT: Negative. Eyes: Negative. Respiratory: Negative. Cardiovascular: Negative. Gastrointestinal: Negative. Endocrine: Negative. Genitourinary: Negative. Negative for dysuria, frequency, menstrual problem, pelvic pain, urgency and vaginal discharge. Musculoskeletal: Negative. Skin: Negative. Allergic/Immunologic: Negative. Neurological: Negative. Hematological: Negative. Psychiatric/Behavioral: Negative.         Past Medical History:   Diagnosis Date    Hemorrhoids        Past Surgical History:   Procedure Laterality Date    BREAST SURGERY      implants     SECTION N/A 2020     SECTION performed by [facility] at [facility]    LIP REPAIR         Family History   Problem Relation Age of Onset    Colon Cancer Neg Hx     Colon Polyps Neg Hx     Esophageal Cancer Neg Hx     Liver Cancer Neg Hx     Liver Disease Neg Hx     Rectal Cancer Neg Hx     Stomach Cancer Neg Hx        Social History     Socioeconomic History    Marital status:      Spouse name: Not on file    Number of children: Not on file    Years of education: Not on file    Highest education level: Not on file   Occupational History    Not on file   Social Needs    Financial resource strain: Not on file    Food insecurity     Worry: Not on file     Inability: Not on file   [employer] needs     Medical: Not on file     Non-medical: Not on file   Tobacco Use    Smoking status: Never Smoker    Smokeless tobacco: Never Used   Substance and Sexual Activity    Alcohol use: No    Drug use: No    Sexual activity: Yes     Partners: Male   Lifestyle    Physical activity     Days per week: Not on file     Minutes per session: Not on file    Stress: Not on file   Relationships    Social connections     Talks on phone: Not on file     Gets together: Not on file     Attends Jewish service: Not on file     Active member of club or organization: Not on file     Attends meetings of clubs or organizations: Not on file     Relationship status: Not on file    Intimate partner violence     Fear of current or ex partner: Not on file     Emotionally abused: Not on file     Physically abused: Not on file     Forced sexual activity: Not on file   Other Topics Concern    Not on file   Social History Narrative    Not on file         Current Outpatient Medications:     Docusate Sodium (COLACE PO), Take by mouth, Disp: , Rfl:     Zrknaj-BwIwx-Mbxda-FA-DHA w/oA (VITAPEARL) 30-1.4-200 MG CPCR, Take 1 tablet by mouth daily, Disp: 30 capsule, Rfl: 5    No Known Allergies    BP 97/66   Ht 5' 2\" (1.575 m)   Wt 142 lb (64.4 kg)   Breastfeeding Yes Comment: breast milk and formula  BMI 25.97 kg/m²   Physical Exam  Constitutional:       General: She is not in acute distress. Appearance: She is well-developed. She is not diaphoretic. HENT:      Head: Normocephalic and atraumatic. Hair is normal.      Right Ear: Hearing and external ear normal. No decreased hearing noted. Left Ear: Hearing and external ear normal. No decreased hearing noted. Eyes:      General: No scleral icterus. Conjunctiva/sclera: Conjunctivae normal.      Pupils: Pupils are equal, round, and reactive to light. Neck:      Musculoskeletal: Normal range of motion. Pulmonary:      Effort: Pulmonary effort is normal. No respiratory distress. Abdominal:      General: There is no distension. Palpations: Abdomen is soft. There is no mass. Tenderness: There is no abdominal tenderness. There is no guarding or rebound.    Genitourinary:     Labia:         Right: No rash, tenderness or lesion. Left: No rash, tenderness or lesion. Vagina: Normal.      Cervix: No cervical motion tenderness, discharge or friability. Adnexa:         Right: No mass, tenderness or fullness. Left: No mass, tenderness or fullness. Rectum: Normal.   Musculoskeletal: Normal range of motion. General: No tenderness or deformity. Skin:     General: Skin is warm and dry. Coloration: Skin is not pale. Findings: No erythema or rash. Neurological:      Mental Status: She is alert and oriented to person, place, and time. Cranial Nerves: No cranial nerve deficit. Psychiatric:         Speech: Speech normal.         Behavior: Behavior normal.         Thought Content: Thought content normal.         Judgment: Judgment normal.               Assessment   Diagnosis Orders   1. Encounter for postpartum visit     2. Screening for cervical cancer  Human papillomavirus (HPV) DNA probe thin prep high risk    PAP SMEAR   3. Screening for HPV (human papillomavirus)  Human papillomavirus (HPV) DNA probe thin prep high risk    PAP SMEAR       Plan     1. Pap smear today. 2. Contraception options discussed: now - progesterone only methods. Pt states she is finished having children. Pt will consider IUD, TL, OCP and let us know. Recommend using condoms. Sofía Vaughan am scribing for and in the presence of Dr. Ravinder Goddard. I, Dr. Ravinder Goddard, personally performed the services described in this documentation as scribed by Jake Maddox in my presence, and it is both accurate and complete.

## 2020-10-19 NOTE — PATIENT INSTRUCTIONS
health problems or concerns. · An IUD is convenient. It is always providing birth control. You don't need to remember to take a pill or get a shot. You don't have to interrupt sex to protect against pregnancy. · A hormonal IUD may reduce heavy bleeding and cramping. What are the disadvantages of an IUD? · An IUD doesn't protect against sexually transmitted infections (STIs), such as herpes or HIV/AIDS. If you aren't sure if your sex partner might have an STI, use a condom to protect against disease. · A copper IUD may cause periods with more bleeding and cramping. · You have to see a doctor to have an IUD inserted and removed. Where can you learn more? Go to [email or web address]://[email or web address] Videoeb.[email or web address]. org and sign in to your [employer] account. Enter W057 in the [employer] box to learn more about \"Learning About Birth Control: Intrauterine Device (IUD). \"     If you do not have an account, please click on the \"Sign Up Now\" link. Current as of: February 11, 2020               Content Version: 12.6  © 0906-1104 [employer]. Care instructions adapted under license by [facility] ([facility]). If you have questions about a medical condition or this instruction, always ask your healthcare professional. [address] any warranty or liability for your use of this information.

## 2020-10-19 NOTE — PROGRESS NOTES
The patient returns for her 6 week post-partum visit.   All information below was reviewed with her.     Visit Reason:  Post-Partum Visit       [de-identified] Name:Marcos        Delivery Date:  09/11/2020       Type of Delivery:  cs       Feeding: breast       LMP:         Contraceptive Choices: She isn't sure        Last PAP:  today       Depression: pt states she is doing just fine since her last visit.      Problems:

## 2020-10-22 LAB
HPV COMMENT: ABNORMAL
HPV TYPE 16: NOT DETECTED
HPV TYPE 18: NOT DETECTED
HPVOH (OTHER TYPES): DETECTED

## 2020-10-27 ENCOUNTER — TELEPHONE (OUTPATIENT)
Dept: OBGYN | Age: 35
End: 2020-10-27

## 2020-10-28 ASSESSMENT — ENCOUNTER SYMPTOMS: ALLERGIC/IMMUNOLOGIC NEGATIVE: 1

## 2020-11-10 ENCOUNTER — TELEPHONE (OUTPATIENT)
Dept: OTOLARYNGOLOGY | Facility: CLINIC | Age: 35
End: 2020-11-10

## 2020-11-10 NOTE — TELEPHONE ENCOUNTER
Chris with Dr. Han called and is needing an order before they call the pt with an appointment.  Will inform Dr. Ham

## 2020-11-17 ENCOUNTER — TELEPHONE (OUTPATIENT)
Dept: OTOLARYNGOLOGY | Facility: CLINIC | Age: 35
End: 2020-11-17

## 2020-11-17 NOTE — TELEPHONE ENCOUNTER
Called pt with information regarding the order and information faxed to Dr. Juve Han's office.  LM for pt to call Dr. Han's office to get her appointment set up.

## 2020-11-19 ENCOUNTER — PROCEDURE VISIT (OUTPATIENT)
Dept: OBGYN | Age: 35
End: 2020-11-19
Payer: MEDICAID

## 2020-11-19 ENCOUNTER — TELEPHONE (OUTPATIENT)
Dept: OTOLARYNGOLOGY | Facility: CLINIC | Age: 35
End: 2020-11-19

## 2020-11-19 ENCOUNTER — TRANSCRIBE ORDERS (OUTPATIENT)
Dept: ADMINISTRATIVE | Facility: HOSPITAL | Age: 35
End: 2020-11-19

## 2020-11-19 VITALS — SYSTOLIC BLOOD PRESSURE: 119 MMHG | HEART RATE: 74 BPM | DIASTOLIC BLOOD PRESSURE: 71 MMHG

## 2020-11-19 DIAGNOSIS — D18.01 HEMANGIOMA OF LIP: ICD-10-CM

## 2020-11-19 DIAGNOSIS — Z01.818 PREOP TESTING: Primary | ICD-10-CM

## 2020-11-19 LAB
CONTROL: NORMAL
PREGNANCY TEST URINE, POC: NORMAL

## 2020-11-19 PROCEDURE — 57454 BX/CURETT OF CERVIX W/SCOPE: CPT | Performed by: OBSTETRICS & GYNECOLOGY

## 2020-11-19 PROCEDURE — 81025 URINE PREGNANCY TEST: CPT | Performed by: OBSTETRICS & GYNECOLOGY

## 2020-11-19 RX ORDER — MULTIVIT WITH MINERALS/LUTEIN
250 TABLET ORAL DAILY
COMMUNITY
End: 2021-05-03

## 2020-11-19 RX ORDER — UBIDECARENONE 75 MG
50 CAPSULE ORAL DAILY
COMMUNITY
End: 2021-05-03

## 2020-11-19 NOTE — PATIENT INSTRUCTIONS
Patient Education        Colposcopy: What to Expect at [address] may feel some soreness in your vagina for a day or two if you had a biopsy. Some vaginal bleeding or discharge is normal for up to a week after a biopsy. The discharge may be dark-colored if a solution was put on your cervix. You can use a sanitary pad for the bleeding. It may take a week or two for you to get the test results. This care sheet gives you a general idea about how long it will take for you to recover. But each person recovers at a different pace. Follow the steps below to feel better as quickly as possible. How can you care for yourself at home? Activity    · You can return to work and most daily activities right after the test.   Exercise    · Do not exercise for 1 day after the test.   Medicines    · Your doctor will tell you if and when you can restart your medicines. He or she will also give you instructions about taking any new medicines.     · If you take aspirin or some other blood thinner, ask your doctor if and when to start taking it again. Make sure that you understand exactly what your doctor wants you to do.     · Take an over-the-counter pain medicine, such as acetaminophen (Tylenol), ibuprofen (Advil, Motrin), or naproxen (Aleve). Be safe with medicines. Read and follow all instructions on the label. Do not take two or more pain medicines at the same time unless the doctor told you to. Many pain medicines have acetaminophen, which is Tylenol. Too much acetaminophen (Tylenol) can be harmful. Other instructions    · Use a pad if you have some bleeding.     · Do not douche, have sexual intercourse, or use tampons for 1 week if you had a biopsy. This will allow time for your cervix to heal.     · You can take a bath or shower anytime after the test.   Follow-up care is a key part of your treatment and safety. Be sure to make and go to all appointments, and call your doctor if you are having problems.  It's also a good idea to know your test results and keep a list of the medicines you take. When should you call for help? Call your doctor now or seek immediate medical care if:    · You have severe vaginal bleeding. This means that you are soaking through your usual pads or tampons each hour for 2 or more hours.     · You have pain that does not get better after you take pain medicine.     · You have signs of infection, such as:  ? Increased pain. ? Bad-smelling vaginal discharge. ? A fever. Watch closely for any changes in your health, and be sure to contact your doctor if:    · You have questions or concerns. Where can you learn more? Go to https://chpepiceweb.[email or web address]. org and sign in to your [employer] account. Enter M523 in the [facility] box to learn more about \"Colposcopy: What to Expect at Home. \"     If you do not have an account, please click on the \"Sign Up Now\" link. Current as of: April 29, 2020               Content Version: 12.6  © 2006-2020 [employer]. Care instructions adapted under license by [facility] ([facility]). If you have questions about a medical condition or this instruction, always ask your healthcare professional. [place] [address] any warranty or liability for your use of this information. Patient Education        Human Papillomavirus (HPV): Care Instructions  Your Care Instructions  The human papillomavirus (HPV) is a very common virus. There are many types of HPV. Some types cause the common skin wart. Other types cause genital warts, which can be spread by sexual contact. Some types can increase the risk for cervical and anal cancer. Having one type of HPV does not lead to having another type. Many women who have HPV may not know that they are infected until it is found with a Pap test. Your doctor uses this test to look for abnormal cells on your cervix.  If you have had an abnormal Pap test, your doctor may recommend that you have an HPV test.  Like a Pap test, an HPV test is done on a sample of cells collected from the cervix. If the test finds that you have the types of HPV that might lead to cancer, your doctor may suggest more tests. This does not mean that you will develop cancer; it means that you may have an increased risk. Abnormal cell changes caused by HPV often go away on their own. If the changes do not go away, they can be treated. But because HPV can stay inside the body, the abnormal cervical cells sometimes come back. This is why it is important to follow up with your doctor and have regular Pap tests. Follow-up care is a key part of your treatment and safety. Be sure to make and go to all appointments, and call your doctor if you are having problems. It's also a good idea to know your test results and keep a list of the medicines you take. How can you care for yourself at home? · If you are going to have a Pap or HPV test, do not douche or use tampons or vaginal creams in the 24 hours before the test.  · Do not smoke. Smoking increases the risk for cervical problems and abnormal Pap tests. If you need help quitting, talk to your doctor about stop-smoking programs and medicines. These can increase your chances of quitting for good. · Use latex condoms every time you have sex. Use them from the beginning to the end of sexual contact. · Be sure to tell your sexual partner or partners that you have HPV. Even if you do not have symptoms, you can still pass HPV to others. · Having one sex partner (who does not have STIs and does not have sex with anyone else) is a good way to avoid STIs. When should you call for help? Watch closely for changes in your health, and be sure to contact your doctor if:    · You have vaginal pain during or after sex.     · You have vaginal bleeding when you are not in your menstrual period. Where can you learn more? Go to https://anthony.[facility]. org and sign in to your MyChart account. Enter F690 in the [facility] box to learn more about \"Human Papillomavirus (HPV): Care Instructions. \"     If you do not have an account, please click on the \"Sign Up Now\" link. Current as of: February 26, 2020               Content Version: 12.6  © 0783-2729 [employer]. Care instructions adapted under license by [facility] ([facility]). If you have questions about a medical condition or this instruction, always ask your healthcare professional. [address] any warranty or liability for your use of this information.

## 2020-11-19 NOTE — PROGRESS NOTES
Colposcopy Procedure Note    Indications: Pap smear 1 months ago showed: HRHPV. The prior pap showed no abnormalities. Prior cervical/vaginal disease: normal exam without visible pathology. Prior cervical treatment: no treatment. Procedure Details   The risks and benefits of the procedure and Written informed consent obtained. Speculum placed in vagina and excellent visualization of cervix achieved, cervix swabbed x 3 with acetic acid solution. Findings:  Cervix: acetowhite lesion(s) noted at 6 & 12 o'clock; endocervical curettage performed, cervical biopsies taken at 6 & 12 o'clock, specimen labelled and sent to pathology and hemostasis achieved with Monsel's solution. Vaginal inspection: normal without visible lesions. Vulvar colposcopy: vulvar colposcopy not performed. Specimens: Cervical biopsies & ECC    Complications: none. Diagnosis Orders   1. High risk HPV infection  Surgical Pathology    WA COLPOSC,CERVIX W/ADJ VAG,W/BX & CURRETAG   2. Pre-procedure lab exam  POCT urine pregnancy    Surgical Pathology     Plan:  Specimens labelled and sent to Pathology. Will base further treatment on Pathology findings.

## 2020-11-19 NOTE — TELEPHONE ENCOUNTER
Shelia with Dr. An called and they have scheduled pt for Friday the 27th, @ 1000 for her procedure.  Pt is to go to [facility] on Monday for her covid test.

## 2020-11-23 ENCOUNTER — LAB (OUTPATIENT)
Dept: LAB | Facility: HOSPITAL | Age: 35
End: 2020-11-23

## 2020-11-23 PROCEDURE — U0003 INFECTIOUS AGENT DETECTION BY NUCLEIC ACID (DNA OR RNA); SEVERE ACUTE RESPIRATORY SYNDROME CORONAVIRUS 2 (SARS-COV-2) (CORONAVIRUS DISEASE [COVID-19]), AMPLIFIED PROBE TECHNIQUE, MAKING USE OF HIGH THROUGHPUT TECHNOLOGIES AS DESCRIBED BY CMS-2020-01-R: HCPCS | Performed by: RADIOLOGY

## 2020-11-23 PROCEDURE — C9803 HOPD COVID-19 SPEC COLLECT: HCPCS | Performed by: RADIOLOGY

## 2020-11-24 LAB
COVID LABCORP PRIORITY: NORMAL
SARS-COV-2 RNA RESP QL NAA+PROBE: NOT DETECTED

## 2020-11-30 RX ORDER — METRONIDAZOLE 500 MG/1
500 TABLET ORAL 2 TIMES DAILY
Qty: 14 TABLET | Refills: 0 | Status: SHIPPED | OUTPATIENT
Start: 2020-11-30 | End: 2020-12-07

## 2020-11-30 RX ORDER — METRONIDAZOLE 500 MG/1
500 TABLET ORAL 2 TIMES DAILY
Qty: 14 TABLET | Refills: 0 | Status: SHIPPED | OUTPATIENT
Start: 2020-11-30 | End: 2020-11-30

## 2020-12-04 ENCOUNTER — TELEPHONE (OUTPATIENT)
Dept: OBGYN | Age: 35
End: 2020-12-04

## 2020-12-04 ENCOUNTER — HOSPITAL ENCOUNTER (EMERGENCY)
Age: 35
Discharge: HOME | End: 2020-12-04
Attending: EMERGENCY MEDICINE
Payer: MEDICAID

## 2020-12-04 VITALS
TEMPERATURE: 98.7 F | HEIGHT: 62 IN | WEIGHT: 138 LBS | HEART RATE: 74 BPM | DIASTOLIC BLOOD PRESSURE: 75 MMHG | RESPIRATION RATE: 16 BRPM | OXYGEN SATURATION: 99 % | SYSTOLIC BLOOD PRESSURE: 122 MMHG | BODY MASS INDEX: 25.4 KG/M2

## 2020-12-04 LAB
ALBUMIN SERPL-MCNC: 4.1 G/DL (ref 3.5–5.2)
ALP BLD-CCNC: 65 U/L (ref 35–104)
ALT SERPL-CCNC: 16 U/L (ref 5–33)
ANION GAP SERPL CALCULATED.3IONS-SCNC: 8 MMOL/L (ref 7–19)
AST SERPL-CCNC: 18 U/L (ref 5–32)
BASOPHILS ABSOLUTE: 0 K/UL (ref 0–0.2)
BASOPHILS RELATIVE PERCENT: 0.5 % (ref 0–1)
BILIRUB SERPL-MCNC: 0.4 MG/DL (ref 0.2–1.2)
BUN BLDV-MCNC: 18 MG/DL (ref 6–20)
CALCIUM SERPL-MCNC: 9.4 MG/DL (ref 8.6–10)
CHLORIDE BLD-SCNC: 107 MMOL/L (ref 98–111)
CO2: 27 MMOL/L (ref 22–29)
CREAT SERPL-MCNC: 0.6 MG/DL (ref 0.5–0.9)
EOSINOPHILS ABSOLUTE: 0.1 K/UL (ref 0–0.6)
EOSINOPHILS RELATIVE PERCENT: 2.1 % (ref 0–5)
GFR AFRICAN AMERICAN: >59
GFR NON-AFRICAN AMERICAN: >60
GLUCOSE BLD-MCNC: 90 MG/DL (ref 74–109)
HCT VFR BLD CALC: 38.4 % (ref 37–47)
HEMOGLOBIN: 12.9 G/DL (ref 12–16)
IMMATURE GRANULOCYTES #: 0 K/UL
INR BLD: 1.11 (ref 0.88–1.18)
LYMPHOCYTES ABSOLUTE: 1.6 K/UL (ref 1.1–4.5)
LYMPHOCYTES RELATIVE PERCENT: 23.8 % (ref 20–40)
MCH RBC QN AUTO: 30.9 PG (ref 27–31)
MCHC RBC AUTO-ENTMCNC: 33.6 G/DL (ref 33–37)
MCV RBC AUTO: 91.9 FL (ref 81–99)
MONOCYTES ABSOLUTE: 0.7 K/UL (ref 0–0.9)
MONOCYTES RELATIVE PERCENT: 11.2 % (ref 0–10)
NEUTROPHILS ABSOLUTE: 4.1 K/UL (ref 1.5–7.5)
NEUTROPHILS RELATIVE PERCENT: 62.1 % (ref 50–65)
PDW BLD-RTO: 11.8 % (ref 11.5–14.5)
PLATELET # BLD: 237 K/UL (ref 130–400)
PMV BLD AUTO: 10 FL (ref 9.4–12.3)
POTASSIUM SERPL-SCNC: 4.1 MMOL/L (ref 3.5–5)
PROTHROMBIN TIME: 14.2 SEC (ref 12–14.6)
RBC # BLD: 4.18 M/UL (ref 4.2–5.4)
SODIUM BLD-SCNC: 142 MMOL/L (ref 136–145)
TOTAL PROTEIN: 6.7 G/DL (ref 6.6–8.7)
WBC # BLD: 6.6 K/UL (ref 4.8–10.8)

## 2020-12-04 PROCEDURE — 85610 PROTHROMBIN TIME: CPT

## 2020-12-04 PROCEDURE — 93922 UPR/L XTREMITY ART 2 LEVELS: CPT

## 2020-12-04 PROCEDURE — 93926 LOWER EXTREMITY STUDY: CPT

## 2020-12-04 PROCEDURE — 99284 EMERGENCY DEPT VISIT MOD MDM: CPT

## 2020-12-04 PROCEDURE — 36415 COLL VENOUS BLD VENIPUNCTURE: CPT

## 2020-12-04 PROCEDURE — 80053 COMPREHEN METABOLIC PANEL: CPT

## 2020-12-04 PROCEDURE — 85025 COMPLETE CBC W/AUTO DIFF WBC: CPT

## 2020-12-04 RX ORDER — HYDROCODONE BITARTRATE AND ACETAMINOPHEN 5; 325 MG/1; MG/1
1 TABLET ORAL EVERY 6 HOURS PRN
Qty: 10 TABLET | Refills: 0 | Status: SHIPPED | OUTPATIENT
Start: 2020-12-04 | End: 2020-12-07

## 2020-12-04 ASSESSMENT — ENCOUNTER SYMPTOMS
ABDOMINAL PAIN: 0
COUGH: 0
SHORTNESS OF BREATH: 0

## 2020-12-04 NOTE — TELEPHONE ENCOUNTER
----- Message from Kike Villalba MD sent at 12/1/2020 11:49 PM CST -----  Negative colpo.   Repeat pap 1 year

## 2020-12-04 NOTE — ED PROVIDER NOTES
N/A 2020     SECTION performed by Myra Gomez MD at [facility] [address]       Discharge Medication List as of 2020 11:28 AM      CONTINUE these medications which have NOT CHANGED    Details   metroNIDAZOLE (FLAGYL) 500 MG tablet Take 1 tablet by mouth 2 times daily for 7 days, Disp-14 tablet,R-0Normal      Ascorbic Acid (VITAMIN C) 250 MG tablet Take 250 mg by mouth dailyHistorical Med      vitamin B-12 (CYANOCOBALAMIN) 100 MCG tablet Take 50 mcg by mouth dailyHistorical Med      Docusate Sodium (COLACE PO) Take by mouthHistorical Med      Khsykq-XpFqx-Bddsp-FA-DHA w/oA (VITAPEARL) 30-1.4-200 MG CPCR Take 1 tablet by mouth daily, Disp-30 capsule,R-5Normal             ALLERGIES     Patient has no known allergies.     FAMILY HISTORY       Family History   Problem Relation Age of Onset    Colon Cancer Neg Hx     Colon Polyps Neg Hx     Esophageal Cancer Neg Hx     Liver Cancer Neg Hx     Liver Disease Neg Hx     Rectal Cancer Neg Hx     Stomach Cancer Neg Hx           SOCIAL HISTORY       Social History     Socioeconomic History    Marital status:      Spouse name: None    Number of children: None    Years of education: None    Highest education level: None   Occupational History    None   Social Needs    Financial resource strain: None    Food insecurity     Worry: None     Inability: None    Transportation needs     Medical: None     Non-medical: None   Tobacco Use    Smoking status: Never Smoker    Smokeless tobacco: Never Used   Substance and Sexual Activity    Alcohol use: No    Drug use: No    Sexual activity: Yes     Partners: Male   Lifestyle    Physical activity     Days per week: None     Minutes per session: None    Stress: None   Relationships    Social connections     Talks on phone: None     Gets together: None     Attends Synagogue service: None     Active member of club or organization: None     Attends meetings of clubs or organizations: None     Relationship status: None    Intimate partner violence     Fear of current or ex partner: None     Emotionally abused: None     Physically abused: None     Forced sexual activity: None   Other Topics Concern    None   Social History Narrative    None       SCREENINGS    Bree Coma Scale  Eye Opening: Spontaneous  Best Verbal Response: Oriented  Best Motor Response: Obeys commands  Bree Coma Scale Score: 15        PHYSICAL EXAM    (up to 7 for level 4, 8 or more for level 5)     ED Triage Vitals [12/04/20 0634]   BP Temp Temp Source Pulse Resp SpO2 Height Weight   122/75 98.7 °F (37.1 °C) Oral 74 16 99 % 5' 2\" (1.575 m) 138 lb (62.6 kg)       Physical Exam  Vitals signs and nursing note reviewed. Constitutional:       General: She is not in acute distress. Appearance: Normal appearance. She is normal weight. She is not ill-appearing, toxic-appearing or diaphoretic. HENT:      Head: Normocephalic and atraumatic. Mouth/Throat:      Comments: Swelling of the upper lip she states she is had a hemangioma there. No active bleeding. Eyes:      Extraocular Movements: Extraocular movements intact. Pupils: Pupils are equal, round, and reactive to light. Neck:      Musculoskeletal: Normal range of motion and neck supple. Cardiovascular:      Rate and Rhythm: Normal rate and regular rhythm. Pulmonary:      Effort: Pulmonary effort is normal. No respiratory distress. Abdominal:      General: Abdomen is flat. Tenderness: There is no abdominal tenderness. Musculoskeletal:         General: Swelling and tenderness present. Legs:       Comments: Normal pedal pulses and cap refill. Normal sensation and movement of the foot and ankle and knee. Skin:     General: Skin is warm and dry. Neurological:      General: No focal deficit present. Mental Status: She is alert and oriented to person, place, and time.    Psychiatric:         Mood and Affect: Mood normal.         Behavior: Behavior normal.         DIAGNOSTIC RESULTS     EKG: All EKG's are interpreted by the Emergency Department Physician who either signs or Co-signs this chart in the absence of a cardiologist.        RADIOLOGY:   Non-plain film images such as CT, Ultrasound and MRI are read by the radiologist. Filemon Eason images are visualized and preliminarily interpreted by the emergency physician with the below findings:        Interpretation per the Radiologist below, if available at the time of this note:    VL DUP LOWER EXTREMITY ARTERIES RIGHT   Final Result      VL REMI BILATERAL LIMITED 1-2 LEVELS   Final Result            ED BEDSIDE ULTRASOUND:   Performed by ED Physician - none    LABS:  Labs Reviewed   CBC WITH AUTO DIFFERENTIAL - Abnormal; Notable for the following components:       Result Value    RBC 4.18 (*)     Monocytes % 11.2 (*)     All other components within normal limits   COMPREHENSIVE METABOLIC PANEL   PROTIME-INR       All other labs were within normal range or not returned as of this dictation. EMERGENCY DEPARTMENT COURSE and DIFFERENTIALDIAGNOSIS/MDM:   Vitals:    Vitals:    12/04/20 0634   BP: 122/75   Pulse: 74   Resp: 16   Temp: 98.7 °F (37.1 °C)   TempSrc: Oral   SpO2: 99%   Weight: 138 lb (62.6 kg)   Height: 5' 2\" (1.575 m)       MDM  Number of Diagnoses or Management Options  Right groin pain:   Diagnosis management comments: Patient with leg pain status post cath in [place]. Is been ongoing x1 week. There is no pseudoaneurysm. I reviewed the study with the vascular surgeon on-call. I discussed the vascular lab ultrasound with Dr. Luis Caicedo. He states she can be discharged and follow-up in a week for a follow-up outpatient and be seen in their office. Patient breast-feeding discussed risk and benefits of pain medication using ice pack keeping elevated. She states that she will do well that she will follow-up with Dr. Luis Caicedo outpatient. MD  12/05/20 1406

## 2020-12-04 NOTE — PROGRESS NOTES
Vascular Preliminary Report      Right Lower Extremity Arterial exam and Ankle Brachial Index Completed. No evidence of pseudoaneurysm, hematoma, or DVT noted. Possible resolved dissection versus newly formed plaque within the proximal Right Common Femoral Artery. Rt REMI: 1.11  Lt REMI: 1.09      Final Reports Pending.

## 2020-12-09 ENCOUNTER — TELEPHONE (OUTPATIENT)
Dept: VASCULAR SURGERY | Age: 35
End: 2020-12-09

## 2020-12-28 RX ORDER — PRENATAL 71/IRON/FOLIC AC/DHA 30-1.4-2
1 CAPSULE,IMMEDIATE, DELAY RELEASE,BIPHASE ORAL DAILY
Qty: 30 CAPSULE | Refills: 5 | Status: SHIPPED | OUTPATIENT
Start: 2020-12-28 | End: 2021-05-03

## 2021-02-12 RX ORDER — FLUCONAZOLE 150 MG/1
150 TABLET ORAL
Qty: 2 TABLET | Refills: 0 | Status: SHIPPED | OUTPATIENT
Start: 2021-02-12 | End: 2021-02-15 | Stop reason: SDUPTHER

## 2021-02-15 RX ORDER — FLUCONAZOLE 150 MG/1
150 TABLET ORAL
Qty: 2 TABLET | Refills: 0 | Status: SHIPPED | OUTPATIENT
Start: 2021-02-15 | End: 2021-02-21

## 2021-03-23 ENCOUNTER — TELEPHONE (OUTPATIENT)
Dept: SURGERY | Age: 36
End: 2021-03-23

## 2021-03-23 NOTE — TELEPHONE ENCOUNTER
Spoke with  and she agreed to see the patient today. Called and left a vm for the patient letting her know she could be seen today if she chooses and to return the call if she would like.

## 2021-03-23 NOTE — TELEPHONE ENCOUNTER
Patient sent a my chart msg requesting to be seen sooner than April 1st appt. Please contact patient and advise who she would be able to see sooner due to [address] being out of town this week.   Thank you  Ph [phone]  Practice Staff

## 2021-04-01 ENCOUNTER — PREP FOR PROCEDURE (OUTPATIENT)
Dept: SURGERY | Age: 36
End: 2021-04-01

## 2021-04-01 ENCOUNTER — OFFICE VISIT (OUTPATIENT)
Dept: SURGERY | Age: 36
End: 2021-04-01
Payer: MEDICAID

## 2021-04-01 VITALS
HEIGHT: 62 IN | TEMPERATURE: 98 F | BODY MASS INDEX: 24.84 KG/M2 | HEART RATE: 76 BPM | WEIGHT: 135 LBS | OXYGEN SATURATION: 98 %

## 2021-04-01 DIAGNOSIS — N76.0 BV (BACTERIAL VAGINOSIS): Primary | ICD-10-CM

## 2021-04-01 DIAGNOSIS — K64.4 EXTERNAL HEMORRHOIDS: Primary | ICD-10-CM

## 2021-04-01 DIAGNOSIS — B96.89 BV (BACTERIAL VAGINOSIS): Primary | ICD-10-CM

## 2021-04-01 PROCEDURE — 99213 OFFICE O/P EST LOW 20 MIN: CPT | Performed by: SURGERY

## 2021-04-01 PROCEDURE — 1036F TOBACCO NON-USER: CPT | Performed by: SURGERY

## 2021-04-01 PROCEDURE — G8427 DOCREV CUR MEDS BY ELIG CLIN: HCPCS | Performed by: SURGERY

## 2021-04-01 PROCEDURE — G8420 CALC BMI NORM PARAMETERS: HCPCS | Performed by: SURGERY

## 2021-04-01 RX ORDER — SODIUM CHLORIDE, SODIUM LACTATE, POTASSIUM CHLORIDE, CALCIUM CHLORIDE 600; 310; 30; 20 MG/100ML; MG/100ML; MG/100ML; MG/100ML
INJECTION, SOLUTION INTRAVENOUS CONTINUOUS
Status: CANCELLED | OUTPATIENT
Start: 2021-04-01

## 2021-04-01 RX ORDER — METRONIDAZOLE 500 MG/1
500 TABLET ORAL 2 TIMES DAILY
Qty: 14 TABLET | Refills: 0 | Status: SHIPPED | OUTPATIENT
Start: 2021-04-01 | End: 2021-04-01

## 2021-04-01 RX ORDER — SODIUM CHLORIDE 0.9 % (FLUSH) 0.9 %
10 SYRINGE (ML) INJECTION EVERY 12 HOURS SCHEDULED
Status: CANCELLED | OUTPATIENT
Start: 2021-04-01

## 2021-04-01 RX ORDER — SODIUM CHLORIDE 0.9 % (FLUSH) 0.9 %
10 SYRINGE (ML) INJECTION PRN
Status: CANCELLED | OUTPATIENT
Start: 2021-04-01

## 2021-04-01 ASSESSMENT — ENCOUNTER SYMPTOMS
SHORTNESS OF BREATH: 0
EYE PAIN: 0
SORE THROAT: 0
DIARRHEA: 0
CONSTIPATION: 1
EYE REDNESS: 0
COUGH: 0
ABDOMINAL PAIN: 0
ABDOMINAL DISTENTION: 0
CHEST TIGHTNESS: 0
NAUSEA: 0
BACK PAIN: 0
COLOR CHANGE: 0
VOMITING: 0

## 2021-04-01 NOTE — PROGRESS NOTES
SUBJECTIVE:  Ms. Warren Pickens is a 30 y.o. female who presents today with continued hemorrhoidal issues. She states last week the area enlarged, became very firm and for three days she was not able to walk much due to pain. The pain has improved some with anusol and sitz baths. She is not presently lifting as much as she used to. She has soft, loose stools. Patient's medications, allergies, past medical, surgical, social and family histories werereviewed and updated as appropriate. Review of Systems   Constitutional: Negative for fatigue, fever and unexpected weight change. HENT: Negative for hearing loss, nosebleeds and sore throat. Eyes: Negative for pain, redness and visual disturbance. Respiratory: Negative for cough, chest tightness and shortness of breath. Cardiovascular: Negative for chest pain, palpitations and leg swelling. Gastrointestinal: Positive for constipation. Negative for abdominal distention, abdominal pain, diarrhea, nausea and vomiting. Endocrine: Negative for cold intolerance, heat intolerance and polydipsia. Genitourinary: Negative for difficulty urinating, frequency and urgency. Musculoskeletal: Negative for back pain, joint swelling and neck pain. Skin: Negative for color change, rash and wound. Allergic/Immunologic: Negative for environmental allergies and food allergies. Neurological: Negative for seizures, light-headedness and headaches. Hematological: Negative for adenopathy. Does not bruise/bleed easily. Psychiatric/Behavioral: Negative for confusion, sleep disturbance and suicidal ideas. OBJECTIVE:  Pulse 76   Temp 98 °F (36.7 °C) (Temporal)   Ht 5' 2\" (1.575 m)   Wt 135 lb (61.2 kg)   SpO2 98%   BMI 24.69 kg/m²   Physical Exam  Vitals signs reviewed. Exam conducted with a chaperone present. Constitutional:       Appearance: She is well-developed. HENT:      Head: Normocephalic and atraumatic.    Eyes:      Pupils: Pupils are equal, round, and reactive to light. Neck:      Musculoskeletal: Normal range of motion. Cardiovascular:      Rate and Rhythm: Normal rate and regular rhythm. Heart sounds: Normal heart sounds. Pulmonary:      Effort: Pulmonary effort is normal.      Breath sounds: Normal breath sounds. No wheezing or rales. Abdominal:      General: Bowel sounds are normal. There is no distension. Palpations: Abdomen is soft. Tenderness: There is no abdominal tenderness. There is no guarding or rebound. Genitourinary:     Exam position: Knee-chest position. Rectum: External hemorrhoid and internal hemorrhoid present. Comments: Circumferential hemorrhoidal skin with palpable tender nodule at right lateral pole. No signs of thrombosis. No bleeding. TELMA deferred. Musculoskeletal: Normal range of motion. Lymphadenopathy:      Cervical: No cervical adenopathy. Skin:     General: Skin is warm and dry. Neurological:      Mental Status: She is alert and oriented to person, place, and time. Deep Tendon Reflexes: Reflexes are normal and symmetric. Psychiatric:         Behavior: Behavior normal.         Thought Content: Thought content normal.         Judgment: Judgment normal.         ASSESSMENT:   Diagnosis Orders   1. External hemorrhoids         PLAN:  No orders of the defined types were placed in this encounter. No orders of the defined types were placed in this encounter. The risks, benefits, and alternatives were discussed with the pt. She is willing to accept the risks and proceed with a hemorrhoidectomy. The surgical risks included but not limited to bleeding, infection, stenosis, incontinence, recurrence, risk of needing further surgery, etc. The anesthetic risks included heart attacks, strokes, pneumonia, phlebitis, etc.  She is willing to accept all risks and proceed with surgery. No guarantees have been given. Return for Post-operative Visit.     Abi Wing DO 0/5/0823 2:59 PM

## 2021-04-01 NOTE — H&P
[facility] Surgery History and Physical   SUBJECTIVE:  Ms. Eugenia Mann is a 30 y.o. female who presents today with continued hemorrhoidal issues. She states last week the area enlarged, became very firm and for three days she was not able to walk much due to pain. The pain has improved some with anusol and sitz baths. She is not presently lifting as much as she used to. She has soft, loose stools. Patient's medications, allergies, past medical, surgical, social and family histories werereviewed and updated as appropriate. Review of Systems   Constitutional: Negative for fatigue, fever and unexpected weight change. HENT: Negative for hearing loss, nosebleeds and sore throat. Eyes: Negative for pain, redness and visual disturbance. Respiratory: Negative for cough, chest tightness and shortness of breath. Cardiovascular: Negative for chest pain, palpitations and leg swelling. Gastrointestinal: Positive for constipation. Negative for abdominal distention, abdominal pain, diarrhea, nausea and vomiting. Endocrine: Negative for cold intolerance, heat intolerance and polydipsia. Genitourinary: Negative for difficulty urinating, frequency and urgency. Musculoskeletal: Negative for back pain, joint swelling and neck pain. Skin: Negative for color change, rash and wound. Allergic/Immunologic: Negative for environmental allergies and food allergies. Neurological: Negative for seizures, light-headedness and headaches. Hematological: Negative for adenopathy. Does not bruise/bleed easily. Psychiatric/Behavioral: Negative for confusion, sleep disturbance and suicidal ideas. OBJECTIVE:  Pulse 76   Temp 98 °F (36.7 °C) (Temporal)   Ht 5' 2\" (1.575 m)   Wt 135 lb (61.2 kg)   SpO2 98%   BMI 24.69 kg/m²   Physical Exam  Vitals signs reviewed. Exam conducted with a chaperone present. Constitutional:       Appearance: She is well-developed.    HENT:      Head: Normocephalic and atraumatic. Eyes:      Pupils: Pupils are equal, round, and reactive to light. Neck:      Musculoskeletal: Normal range of motion. Cardiovascular:      Rate and Rhythm: Normal rate and regular rhythm. Heart sounds: Normal heart sounds. Pulmonary:      Effort: Pulmonary effort is normal.      Breath sounds: Normal breath sounds. No wheezing or rales. Abdominal:      General: Bowel sounds are normal. There is no distension. Palpations: Abdomen is soft. Tenderness: There is no abdominal tenderness. There is no guarding or rebound. Genitourinary:     Exam position: Knee-chest position. Rectum: External hemorrhoid and internal hemorrhoid present. Comments: Circumferential hemorrhoidal skin with palpable tender nodule at right lateral pole. No signs of thrombosis. No bleeding. TELMA deferred. Musculoskeletal: Normal range of motion. Lymphadenopathy:      Cervical: No cervical adenopathy. Skin:     General: Skin is warm and dry. Neurological:      Mental Status: She is alert and oriented to person, place, and time. Deep Tendon Reflexes: Reflexes are normal and symmetric. Psychiatric:         Behavior: Behavior normal.         Thought Content: Thought content normal.         Judgment: Judgment normal.         ASSESSMENT:   Diagnosis Orders   1. External hemorrhoids         PLAN:  No orders of the defined types were placed in this encounter. No orders of the defined types were placed in this encounter. The risks, benefits, and alternatives were discussed with the pt. She is willing to accept the risks and proceed with a hemorrhoidectomy. The surgical risks included but not limited to bleeding, infection, incontinence, stricture, recurrence, risk of needing further surgery, etc. The anesthetic risks included heart attacks, strokes, pneumonia, phlebitis, etc.  She is willing to accept all risks and proceed with surgery. No guarantees have been given.       Return for Post-operative Visit.     Marta Eaton DO 4/7/3384 2:59 PM

## 2021-04-01 NOTE — LETTER
SCHEDULING INSTRUCTIONS:     Patient: Antoine Lundy  : 1985    Hospital: [facility]    Admitting Physician:  Dr. Humberto Singleton    Diagnosis: Hemorrhoidal Disease     Procedure: Hemorrhoidectomy (lithotomy, hand-held ligasure)    ALLOW ADDITIONAL 30 MINS FOR TURNOVER EXCEPT FOR PHYSICIAN'S BOUNCE DAY    Anesthesia: General     Admission:Outpatient     Date: 2021               NOT TO BE USED OUTSIDE OF THE OFFICE

## 2021-04-02 ENCOUNTER — OFFICE VISIT (OUTPATIENT)
Age: 36
End: 2021-04-02

## 2021-04-02 DIAGNOSIS — Z11.59 SCREENING FOR VIRAL DISEASE: Primary | ICD-10-CM

## 2021-04-02 LAB — SARS-COV-2, PCR: DETECTED

## 2021-04-02 PROCEDURE — 99999 PR OFFICE/OUTPT VISIT,PROCEDURE ONLY: CPT | Performed by: NURSE PRACTITIONER

## 2021-04-05 ENCOUNTER — TELEPHONE (OUTPATIENT)
Dept: SURGERY | Age: 36
End: 2021-04-05

## 2021-04-05 RX ORDER — HYDROCORTISONE ACETATE 25 MG/1
25 SUPPOSITORY RECTAL 2 TIMES DAILY PRN
Qty: 30 SUPPOSITORY | Refills: 2 | Status: SHIPPED | OUTPATIENT
Start: 2021-04-05 | End: 2021-05-03

## 2021-04-05 NOTE — TELEPHONE ENCOUNTER
4/6/21 surgery cancelled - Covid + - pt is requesting Rx refill until she is able to have surgery    Cc: Dr Mari Reece

## 2021-04-12 ENCOUNTER — OFFICE VISIT (OUTPATIENT)
Dept: OTOLARYNGOLOGY | Facility: CLINIC | Age: 36
End: 2021-04-12

## 2021-04-12 VITALS — TEMPERATURE: 98.4 F | SYSTOLIC BLOOD PRESSURE: 117 MMHG | DIASTOLIC BLOOD PRESSURE: 91 MMHG | HEART RATE: 81 BPM

## 2021-04-12 DIAGNOSIS — I78.1 CAPILLARY HEMANGIOMA: Primary | ICD-10-CM

## 2021-04-12 DIAGNOSIS — L90.5 SCAR: ICD-10-CM

## 2021-04-12 PROCEDURE — 17250 CHEM CAUT OF GRANLTJ TISSUE: CPT | Performed by: OTOLARYNGOLOGY

## 2021-04-12 NOTE — PROGRESS NOTES
Preoperative Diagnosis: granulation tissue of upper lip    Postoperative Diagnosis: Same    Procedure: Destruction with chemical cautery - 1.5 cm    Provider: Chris Ham MD    Anesthesia: None    Location: [facility] office    Complications: None    Details of Procedure:     Patient identity was verified and consent was signed.  The area was topically anesthetized with Cetacaine spray for approximately 15 minutes.  Silver nitrate was then used to cauterize the area of granulation.  The patient tolerated the procedure without complication.    Chris Ham MD  04/12/21  17:22 CDT

## 2021-04-12 NOTE — PATIENT INSTRUCTIONS
CONTACT INFORMATION:  The main office phone number is [phone]. For emergencies after hours and on weekends, this number will convert over to our answering service and the on call provider will answer. Please try to keep non emergent phone calls/ questions to office hours 9am-5pm Monday through Friday.     [employer]  As an alternative, you can sign up and use the Epic MyChart system for more direct and quicker access for non emergent questions/ problems.  [facility] [employer] allows you to send messages to your doctor, view your test results, renew your prescriptions, schedule appointments, and more. To sign up, go to [employer]ell[employer] and click on the Sign Up Now link in the New User? box. Enter your [employer] Activation Code exactly as it appears below along with the last four digits of your Social Security Number and your Date of Birth () to complete the sign-up process. If you do not sign up before the expiration date, you must request a new code.    [employer] Activation Code: [email or web address]  Expires: 2021 10:08 AM    If you have questions, you can email [email or web address] or call [phone] to talk to our [employer] staff. Remember, [employer] is NOT to be used for urgent needs. For medical emergencies, dial 911.

## 2021-04-12 NOTE — PROGRESS NOTES
PRIMARY CARE PROVIDER: Mary Urias MD  REFERRING PROVIDER: No ref. provider found    Chief Complaint   Patient presents with   • capillary hemangioma       Subjective   History of Present Illness:  Luh Tapia is a  35 y.o. female who complains of persistent drainage from the upper lip.  This has resolved in the past with cryotherapy.  She reports the drainage is clear, and slightly yellow.  She denies any fevers or chills.      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 She is status post recontouring of the upper lip on 2019.      When she presented for my initial evaluation on 2017, the following was the history:  She states this area has been excised 3 times in [place] prior to the age of 13. She was told this was a hemangioma. Dr. Lazo in [place] excised this area again in  removing scarring. She states that Dr. Lazo accidentally incised the white lip in vertical fashion.  She was sent to [facility] for potential embolization, but there was no significant feeding vessel to embolize.      Review of Systems:  Review of Systems   Constitutional: Negative for chills, fatigue, fever and unexpected weight change.   HENT: Negative for facial swelling.    Respiratory: Negative for cough, chest tightness and shortness of breath.    Cardiovascular: Negative for chest pain.   Musculoskeletal: Negative for neck pain.   Skin: Positive for color change and wound.   Neurological: Positive for facial asymmetry.   Hematological: Negative for adenopathy. Does not bruise/bleed easily.       Past History:  Past Medical History:   Diagnosis Date   • Hemangioma of lip 2017    Right Upper Lip   • PONV (postoperative nausea and vomiting)     EXTREME     Past Surgical History:   Procedure Laterality Date   • BREAST SURGERY Bilateral 2014    IMPLANTS    •  SECTION     • HEAD/NECK LESION/CYST EXCISION Bilateral  9/12/2017    Procedure: Excision of neoplasm of uncertain behavior of the upper lip with linear closure (71224);  Surgeon: Chris Ham MD;  Location: [facility];  Service:    • LIP REPAIR      OVER LAST 26 YEARS HAS HAD 4 OTHER SURGERIES ON LIP   • OTHER SURGICAL HISTORY      Lip     Family History   Problem Relation Age of Onset   • No Known Problems Mother    • No Known Problems Father      Social History     Tobacco Use   • Smoking status: Never Smoker   • Smokeless tobacco: Never Used   Substance Use Topics   • Alcohol use: No   • Drug use: No     Allergies:  Patient has no known allergies.  No current outpatient medications on file.      Objective     Vital Signs:  Temp:  [98.4 °F (36.9 °C)] 98.4 °F (36.9 °C)  Heart Rate:  [81] 81  BP: (117)/(91) 117/91    Physical Exam:  Physical Exam  Vitals and nursing note reviewed.   Constitutional:       General: She is not in acute distress.     Appearance: She is well-developed. She is not diaphoretic.   HENT:      Head: Normocephalic and atraumatic.      Right Ear: External ear normal.      Left Ear: External ear normal.      Nose: Septal deviation, mucosal edema and rhinorrhea present.      Mouth/Throat:     Eyes:      General: No scleral icterus.        Right eye: No discharge.         Left eye: No discharge.      Conjunctiva/sclera: Conjunctivae normal.      Pupils: Pupils are equal, round, and reactive to light.   Neck:      Thyroid: No thyromegaly.      Vascular: No JVD.      Trachea: No tracheal deviation.   Pulmonary:      Effort: Pulmonary effort is normal.      Breath sounds: No stridor.   Musculoskeletal:         General: No deformity. Normal range of motion.      Cervical back: Normal range of motion and neck supple.   Lymphadenopathy:      Cervical: No cervical adenopathy.   Skin:     General: Skin is warm and dry.      Coloration: Skin is not pale.      Findings: No erythema or rash.   Neurological:      Mental Status: She is alert and oriented to  person, place, and time.      Cranial Nerves: No cranial nerve deficit.      Coordination: Coordination normal.   Psychiatric:         Speech: Speech normal.         Behavior: Behavior normal. Behavior is cooperative.         Thought Content: Thought content normal.         Judgment: Judgment normal.         Assessment   Assessment:  1. Capillary hemangioma    2. Scar        Plan   Plan:    We topicalized the area and to cauterize this with silver nitrate.  I will see her back in approximate 1 month for further evaluation.    My findings and recommendations were discussed and questions were answered.     Chris Ham MD  04/12/21  16:45 CDT

## 2021-05-03 ENCOUNTER — OFFICE VISIT (OUTPATIENT)
Dept: OBGYN CLINIC | Age: 36
End: 2021-05-03
Payer: MEDICAID

## 2021-05-03 ENCOUNTER — HOSPITAL ENCOUNTER (OUTPATIENT)
Dept: ULTRASOUND IMAGING | Age: 36
Discharge: HOME | End: 2021-05-03
Payer: MEDICAID

## 2021-05-03 VITALS
SYSTOLIC BLOOD PRESSURE: 113 MMHG | BODY MASS INDEX: 24.14 KG/M2 | HEART RATE: 71 BPM | DIASTOLIC BLOOD PRESSURE: 73 MMHG | WEIGHT: 132 LBS

## 2021-05-03 DIAGNOSIS — R10.2 PELVIC PAIN: ICD-10-CM

## 2021-05-03 DIAGNOSIS — R10.2 PELVIC PAIN: Primary | ICD-10-CM

## 2021-05-03 DIAGNOSIS — N89.8 VAGINAL DISCHARGE: ICD-10-CM

## 2021-05-03 DIAGNOSIS — N89.8 VAGINAL ODOR: ICD-10-CM

## 2021-05-03 LAB
BILIRUBIN URINE: NEGATIVE
BLOOD, URINE: NEGATIVE
CLARITY: CLEAR
COLOR: ABNORMAL
GLUCOSE URINE: NEGATIVE MG/DL
KETONES, URINE: 40 MG/DL
LEUKOCYTE ESTERASE, URINE: NEGATIVE
NITRITE, URINE: NEGATIVE
PH UA: 5.5 (ref 5–8)
PROTEIN UA: NEGATIVE MG/DL
SPECIFIC GRAVITY UA: 1.02 (ref 1–1.03)
UROBILINOGEN, URINE: 0.2 E.U./DL

## 2021-05-03 PROCEDURE — 1036F TOBACCO NON-USER: CPT | Performed by: NURSE PRACTITIONER

## 2021-05-03 PROCEDURE — 76830 TRANSVAGINAL US NON-OB: CPT

## 2021-05-03 PROCEDURE — 99214 OFFICE O/P EST MOD 30 MIN: CPT | Performed by: NURSE PRACTITIONER

## 2021-05-03 PROCEDURE — G8420 CALC BMI NORM PARAMETERS: HCPCS | Performed by: NURSE PRACTITIONER

## 2021-05-03 PROCEDURE — G8427 DOCREV CUR MEDS BY ELIG CLIN: HCPCS | Performed by: NURSE PRACTITIONER

## 2021-05-03 ASSESSMENT — ENCOUNTER SYMPTOMS
RESPIRATORY NEGATIVE: 1
EYES NEGATIVE: 1
GASTROINTESTINAL NEGATIVE: 1

## 2021-05-03 NOTE — PROGRESS NOTES
David Garcia is a 30 y.o. female who presents today for her medical conditions/ complaints as noted below. David Garcia is c/o of Gynecologic Exam and Pelvic Pain        HPI  Patient has c/o recurrent vaginal infections and pelvic pain  States bad odor and soon as finished med, come right back. Large amt. Patient's last menstrual period was 2021.     Past Medical History:   Diagnosis Date    Hemorrhoids      Past Surgical History:   Procedure Laterality Date    BREAST SURGERY      implants     SECTION N/A 2020     SECTION performed by Deandra Dotson MD [facility] [address] [facility]    LIP REPAIR       Family History   Problem Relation Age of Onset    Colon Cancer Neg Hx     Colon Polyps Neg Hx     Esophageal Cancer Neg Hx     Liver Cancer Neg Hx     Liver Disease Neg Hx     Rectal Cancer Neg Hx     Stomach Cancer Neg Hx      Social History     Tobacco Use    Smoking status: Never Smoker    Smokeless tobacco: Never Used   Substance Use Topics    Alcohol use: No       No current outpatient medications on file. No current facility-administered medications for this visit. No Known Allergies  Vitals:    21 1113   BP: 113/73   Pulse: 71     Body mass index is 24.14 kg/m². Review of Systems   Constitutional: Negative. HENT: Negative. Eyes: Negative. Respiratory: Negative. Cardiovascular: Negative. Gastrointestinal: Negative. Genitourinary: Positive for pelvic pain and vaginal discharge (odor). Negative for difficulty urinating, dyspareunia, dysuria, enuresis, frequency, hematuria, menstrual problem and urgency. Musculoskeletal: Negative. Skin: Negative. Neurological: Negative. Psychiatric/Behavioral: Negative. Physical Exam  Vitals signs and nursing note reviewed. Constitutional:       General: She is not in acute distress. Appearance: She is well-developed. She is not diaphoretic.    HENT:      Head: Normocephalic and atraumatic. Eyes:      Conjunctiva/sclera: Conjunctivae normal.      Pupils: Pupils are equal, round, and reactive to light. Neck:      Musculoskeletal: Normal range of motion. Pulmonary:      Effort: Pulmonary effort is normal.   Abdominal:      Tenderness: There is abdominal tenderness in the right lower quadrant, suprapubic area and left lower quadrant. There is no guarding. Genitourinary:     Comments: diatherix collected  Musculoskeletal: Normal range of motion. Comments: Normal ROM in all 4 extremities; normal gait   Skin:     General: Skin is warm and dry. Neurological:      Mental Status: She is alert and oriented to person, place, and time. Motor: No abnormal muscle tone. Coordination: Coordination normal.   Psychiatric:         Behavior: Behavior normal.          Diagnosis Orders   1. Pelvic pain  US NON OB TRANSVAGINAL    Urinalysis Reflex to Culture   2. Vaginal discharge     3. Vaginal odor         MEDICATIONS:  No orders of the defined types were placed in this encounter. ORDERS:  Orders Placed This Encounter   Procedures    US NON OB TRANSVAGINAL    Urinalysis Reflex to Culture       PLAN:  diatherix sent  To US now and lab for urine after  There are no Patient Instructions on file for this visit.

## 2021-05-11 ENCOUNTER — APPOINTMENT (OUTPATIENT)
Dept: CT IMAGING | Age: 36
End: 2021-05-11
Payer: MEDICAID

## 2021-05-11 ENCOUNTER — HOSPITAL ENCOUNTER (EMERGENCY)
Age: 36
Discharge: HOME | End: 2021-05-12
Payer: MEDICAID

## 2021-05-11 VITALS
RESPIRATION RATE: 17 BRPM | BODY MASS INDEX: 21.16 KG/M2 | TEMPERATURE: 97.9 F | WEIGHT: 115 LBS | SYSTOLIC BLOOD PRESSURE: 116 MMHG | DIASTOLIC BLOOD PRESSURE: 77 MMHG | HEIGHT: 62 IN | OXYGEN SATURATION: 98 % | HEART RATE: 82 BPM

## 2021-05-11 DIAGNOSIS — R10.9 FLANK PAIN: Primary | ICD-10-CM

## 2021-05-11 DIAGNOSIS — N30.00 ACUTE CYSTITIS WITHOUT HEMATURIA: ICD-10-CM

## 2021-05-11 DIAGNOSIS — K76.9 LIVER LESION: ICD-10-CM

## 2021-05-11 LAB
ALBUMIN SERPL-MCNC: 4.3 G/DL (ref 3.5–5.2)
ALP BLD-CCNC: 64 U/L (ref 35–104)
ALT SERPL-CCNC: 12 U/L (ref 5–33)
ANION GAP SERPL CALCULATED.3IONS-SCNC: 9 MMOL/L (ref 7–19)
AST SERPL-CCNC: 15 U/L (ref 5–32)
BACTERIA: ABNORMAL /HPF
BASOPHILS ABSOLUTE: 0 K/UL (ref 0–0.2)
BASOPHILS RELATIVE PERCENT: 0.4 % (ref 0–1)
BILIRUB SERPL-MCNC: 0.3 MG/DL (ref 0.2–1.2)
BILIRUBIN URINE: NEGATIVE
BLOOD, URINE: NEGATIVE
BUN BLDV-MCNC: 20 MG/DL (ref 6–20)
CALCIUM SERPL-MCNC: 9.4 MG/DL (ref 8.6–10)
CHLORIDE BLD-SCNC: 101 MMOL/L (ref 98–111)
CLARITY: CLEAR
CO2: 25 MMOL/L (ref 22–29)
COLOR: YELLOW
CREAT SERPL-MCNC: 0.6 MG/DL (ref 0.5–0.9)
CRYSTALS, UA: ABNORMAL /HPF
EOSINOPHILS ABSOLUTE: 0.2 K/UL (ref 0–0.6)
EOSINOPHILS RELATIVE PERCENT: 1.7 % (ref 0–5)
EPITHELIAL CELLS, UA: ABNORMAL /HPF
GFR AFRICAN AMERICAN: >59
GFR NON-AFRICAN AMERICAN: >60
GLUCOSE BLD-MCNC: 100 MG/DL (ref 74–109)
GLUCOSE URINE: NEGATIVE MG/DL
HCG QUALITATIVE: NEGATIVE
HCT VFR BLD CALC: 41.7 % (ref 37–47)
HEMOGLOBIN: 14 G/DL (ref 12–16)
IMMATURE GRANULOCYTES #: 0 K/UL
KETONES, URINE: NEGATIVE MG/DL
LEUKOCYTE ESTERASE, URINE: ABNORMAL
LIPASE: 40 U/L (ref 13–60)
LYMPHOCYTES ABSOLUTE: 2.4 K/UL (ref 1.1–4.5)
LYMPHOCYTES RELATIVE PERCENT: 23.9 % (ref 20–40)
MCH RBC QN AUTO: 30.8 PG (ref 27–31)
MCHC RBC AUTO-ENTMCNC: 33.6 G/DL (ref 33–37)
MCV RBC AUTO: 91.6 FL (ref 81–99)
MONOCYTES ABSOLUTE: 1 K/UL (ref 0–0.9)
MONOCYTES RELATIVE PERCENT: 9.4 % (ref 0–10)
NEUTROPHILS ABSOLUTE: 6.6 K/UL (ref 1.5–7.5)
NEUTROPHILS RELATIVE PERCENT: 64.4 % (ref 50–65)
NITRITE, URINE: NEGATIVE
PDW BLD-RTO: 12.7 % (ref 11.5–14.5)
PH UA: 7 (ref 5–8)
PLATELET # BLD: 256 K/UL (ref 130–400)
PMV BLD AUTO: 9.8 FL (ref 9.4–12.3)
POTASSIUM REFLEX MAGNESIUM: 3.9 MMOL/L (ref 3.5–5)
PROTEIN UA: NEGATIVE MG/DL
RBC # BLD: 4.55 M/UL (ref 4.2–5.4)
RBC UA: ABNORMAL /HPF (ref 0–2)
SODIUM BLD-SCNC: 135 MMOL/L (ref 136–145)
SPECIFIC GRAVITY UA: 1.01 (ref 1–1.03)
TOTAL PROTEIN: 7.2 G/DL (ref 6.6–8.7)
UROBILINOGEN, URINE: 0.2 E.U./DL
WBC # BLD: 10.2 K/UL (ref 4.8–10.8)
WBC UA: ABNORMAL /HPF (ref 0–5)

## 2021-05-11 PROCEDURE — 81001 URINALYSIS AUTO W/SCOPE: CPT

## 2021-05-11 PROCEDURE — 87186 SC STD MICRODIL/AGAR DIL: CPT

## 2021-05-11 PROCEDURE — 6370000000 HC RX 637 (ALT 250 FOR IP): Performed by: NURSE PRACTITIONER

## 2021-05-11 PROCEDURE — 87077 CULTURE AEROBIC IDENTIFY: CPT

## 2021-05-11 PROCEDURE — 74177 CT ABD & PELVIS W/CONTRAST: CPT

## 2021-05-11 PROCEDURE — 83690 ASSAY OF LIPASE: CPT

## 2021-05-11 PROCEDURE — 85025 COMPLETE CBC W/AUTO DIFF WBC: CPT

## 2021-05-11 PROCEDURE — 36415 COLL VENOUS BLD VENIPUNCTURE: CPT

## 2021-05-11 PROCEDURE — 6360000004 HC RX CONTRAST MEDICATION: Performed by: NURSE PRACTITIONER

## 2021-05-11 PROCEDURE — 84703 CHORIONIC GONADOTROPIN ASSAY: CPT

## 2021-05-11 PROCEDURE — 87591 N.GONORRHOEAE DNA AMP PROB: CPT

## 2021-05-11 PROCEDURE — 87491 CHLMYD TRACH DNA AMP PROBE: CPT

## 2021-05-11 PROCEDURE — 2580000003 HC RX 258: Performed by: NURSE PRACTITIONER

## 2021-05-11 PROCEDURE — 99283 EMERGENCY DEPT VISIT LOW MDM: CPT

## 2021-05-11 PROCEDURE — 87086 URINE CULTURE/COLONY COUNT: CPT

## 2021-05-11 PROCEDURE — 80053 COMPREHEN METABOLIC PANEL: CPT

## 2021-05-11 PROCEDURE — 6360000002 HC RX W HCPCS: Performed by: NURSE PRACTITIONER

## 2021-05-11 PROCEDURE — 96374 THER/PROPH/DIAG INJ IV PUSH: CPT

## 2021-05-11 RX ORDER — ONDANSETRON 4 MG/1
4 TABLET, ORALLY DISINTEGRATING ORAL EVERY 8 HOURS PRN
Qty: 10 TABLET | Refills: 0 | Status: SHIPPED | OUTPATIENT
Start: 2021-05-11 | End: 2021-12-07 | Stop reason: ALTCHOICE

## 2021-05-11 RX ORDER — CEPHALEXIN 500 MG/1
500 CAPSULE ORAL 2 TIMES DAILY
Qty: 10 CAPSULE | Refills: 0 | Status: SHIPPED | OUTPATIENT
Start: 2021-05-11 | End: 2021-05-11 | Stop reason: SDUPTHER

## 2021-05-11 RX ORDER — CEPHALEXIN 500 MG/1
500 CAPSULE ORAL 2 TIMES DAILY
Qty: 10 CAPSULE | Refills: 0 | Status: SHIPPED | OUTPATIENT
Start: 2021-05-11 | End: 2021-05-16

## 2021-05-11 RX ORDER — 0.9 % SODIUM CHLORIDE 0.9 %
1000 INTRAVENOUS SOLUTION INTRAVENOUS ONCE
Status: COMPLETED | OUTPATIENT
Start: 2021-05-11 | End: 2021-05-12

## 2021-05-11 RX ORDER — ONDANSETRON 2 MG/ML
4 INJECTION INTRAMUSCULAR; INTRAVENOUS ONCE
Status: COMPLETED | OUTPATIENT
Start: 2021-05-11 | End: 2021-05-11

## 2021-05-11 RX ORDER — MORPHINE SULFATE 4 MG/ML
4 INJECTION, SOLUTION INTRAMUSCULAR; INTRAVENOUS ONCE
Status: COMPLETED | OUTPATIENT
Start: 2021-05-11 | End: 2021-05-11

## 2021-05-11 RX ORDER — HYDROCODONE BITARTRATE AND ACETAMINOPHEN 5; 325 MG/1; MG/1
1 TABLET ORAL EVERY 6 HOURS PRN
Qty: 10 TABLET | Refills: 0 | Status: SHIPPED | OUTPATIENT
Start: 2021-05-11 | End: 2021-05-14

## 2021-05-11 RX ORDER — ONDANSETRON 4 MG/1
4 TABLET, ORALLY DISINTEGRATING ORAL EVERY 8 HOURS PRN
Qty: 10 TABLET | Refills: 0 | Status: SHIPPED | OUTPATIENT
Start: 2021-05-11 | End: 2021-05-11 | Stop reason: SDUPTHER

## 2021-05-11 RX ORDER — CEPHALEXIN 250 MG/1
500 CAPSULE ORAL ONCE
Status: COMPLETED | OUTPATIENT
Start: 2021-05-11 | End: 2021-05-11

## 2021-05-11 RX ADMIN — SODIUM CHLORIDE 1000 ML: 9 INJECTION, SOLUTION INTRAVENOUS at 22:16

## 2021-05-11 RX ADMIN — CEPHALEXIN 500 MG: 250 CAPSULE ORAL at 23:48

## 2021-05-11 RX ADMIN — MORPHINE SULFATE 4 MG: 4 INJECTION, SOLUTION INTRAMUSCULAR; INTRAVENOUS at 22:51

## 2021-05-11 RX ADMIN — MORPHINE SULFATE 4 MG: 4 INJECTION, SOLUTION INTRAMUSCULAR; INTRAVENOUS at 22:16

## 2021-05-11 RX ADMIN — IOPAMIDOL 90 ML: 755 INJECTION, SOLUTION INTRAVENOUS at 22:42

## 2021-05-11 RX ADMIN — ONDANSETRON HYDROCHLORIDE 4 MG: 2 SOLUTION INTRAMUSCULAR; INTRAVENOUS at 22:15

## 2021-05-11 ASSESSMENT — PAIN SCALES - GENERAL: PAINLEVEL_OUTOF10: 9

## 2021-05-11 ASSESSMENT — ENCOUNTER SYMPTOMS
ABDOMINAL PAIN: 1
DIARRHEA: 0
VOMITING: 0
NAUSEA: 1

## 2021-05-12 ENCOUNTER — HOSPITAL ENCOUNTER (EMERGENCY)
Facility: HOSPITAL | Age: 36
Discharge: HOME | End: 2021-05-12
Admitting: EMERGENCY MEDICINE

## 2021-05-12 VITALS
RESPIRATION RATE: 18 BRPM | TEMPERATURE: 98.7 F | WEIGHT: 136 LBS | HEART RATE: 70 BPM | OXYGEN SATURATION: 100 % | SYSTOLIC BLOOD PRESSURE: 122 MMHG | DIASTOLIC BLOOD PRESSURE: 77 MMHG | BODY MASS INDEX: 24.1 KG/M2 | HEIGHT: 63 IN

## 2021-05-12 DIAGNOSIS — R10.9 ABDOMINAL PAIN, UNSPECIFIED ABDOMINAL LOCATION: ICD-10-CM

## 2021-05-12 DIAGNOSIS — N39.0 URINARY TRACT INFECTION WITHOUT HEMATURIA, SITE UNSPECIFIED: ICD-10-CM

## 2021-05-12 DIAGNOSIS — K59.00 CONSTIPATION, UNSPECIFIED CONSTIPATION TYPE: Primary | ICD-10-CM

## 2021-05-12 LAB
ALBUMIN SERPL-MCNC: 3.9 G/DL (ref 3.5–5.2)
ALBUMIN/GLOB SERPL: 1.6 G/DL
ALP SERPL-CCNC: 57 U/L (ref 39–117)
ALT SERPL W P-5'-P-CCNC: 13 U/L (ref 1–33)
ANION GAP SERPL CALCULATED.3IONS-SCNC: 8 MMOL/L (ref 5–15)
AST SERPL-CCNC: 15 U/L (ref 1–32)
BACTERIA UR QL AUTO: ABNORMAL /HPF
BASOPHILS # BLD AUTO: 0.03 10*3/MM3 (ref 0–0.2)
BASOPHILS NFR BLD AUTO: 0.4 % (ref 0–1.5)
BILIRUB SERPL-MCNC: <0.2 MG/DL (ref 0–1.2)
BILIRUB UR QL STRIP: NEGATIVE
BUN SERPL-MCNC: 17 MG/DL (ref 6–20)
BUN/CREAT SERPL: 24.6 (ref 7–25)
CALCIUM SPEC-SCNC: 9.3 MG/DL (ref 8.6–10.5)
CHLORIDE SERPL-SCNC: 105 MMOL/L (ref 98–107)
CLARITY UR: CLEAR
CO2 SERPL-SCNC: 25 MMOL/L (ref 22–29)
COLOR UR: YELLOW
CREAT SERPL-MCNC: 0.69 MG/DL (ref 0.57–1)
DEPRECATED RDW RBC AUTO: 41.8 FL (ref 37–54)
EOSINOPHIL # BLD AUTO: 0.18 10*3/MM3 (ref 0–0.4)
EOSINOPHIL NFR BLD AUTO: 2.3 % (ref 0.3–6.2)
ERYTHROCYTE [DISTWIDTH] IN BLOOD BY AUTOMATED COUNT: 12.8 % (ref 12.3–15.4)
GFR SERPL CREATININE-BSD FRML MDRD: 118 ML/MIN/1.73
GFR SERPL CREATININE-BSD FRML MDRD: 97 ML/MIN/1.73
GLOBULIN UR ELPH-MCNC: 2.5 GM/DL
GLUCOSE SERPL-MCNC: 95 MG/DL (ref 65–99)
GLUCOSE UR STRIP-MCNC: NEGATIVE MG/DL
HCG SERPL QL: NEGATIVE
HCT VFR BLD AUTO: 36.4 % (ref 34–46.6)
HGB BLD-MCNC: 12.4 G/DL (ref 12–15.9)
HGB UR QL STRIP.AUTO: NEGATIVE
HYALINE CASTS UR QL AUTO: ABNORMAL /LPF
IMM GRANULOCYTES # BLD AUTO: 0.02 10*3/MM3 (ref 0–0.05)
IMM GRANULOCYTES NFR BLD AUTO: 0.3 % (ref 0–0.5)
KETONES UR QL STRIP: NEGATIVE
LEUKOCYTE ESTERASE UR QL STRIP.AUTO: ABNORMAL
LIPASE SERPL-CCNC: 42 U/L (ref 13–60)
LYMPHOCYTES # BLD AUTO: 2.37 10*3/MM3 (ref 0.7–3.1)
LYMPHOCYTES NFR BLD AUTO: 29.7 % (ref 19.6–45.3)
MCH RBC QN AUTO: 30.2 PG (ref 26.6–33)
MCHC RBC AUTO-ENTMCNC: 34.1 G/DL (ref 31.5–35.7)
MCV RBC AUTO: 88.8 FL (ref 79–97)
MONOCYTES # BLD AUTO: 0.78 10*3/MM3 (ref 0.1–0.9)
MONOCYTES NFR BLD AUTO: 9.8 % (ref 5–12)
NEUTROPHILS NFR BLD AUTO: 4.61 10*3/MM3 (ref 1.7–7)
NEUTROPHILS NFR BLD AUTO: 57.5 % (ref 42.7–76)
NITRITE UR QL STRIP: NEGATIVE
NRBC BLD AUTO-RTO: 0 /100 WBC (ref 0–0.2)
PH UR STRIP.AUTO: 6 [PH] (ref 5–8)
PLATELET # BLD AUTO: 241 10*3/MM3 (ref 140–450)
PMV BLD AUTO: 10.1 FL (ref 6–12)
POTASSIUM SERPL-SCNC: 4.3 MMOL/L (ref 3.5–5.2)
PROT SERPL-MCNC: 6.4 G/DL (ref 6–8.5)
PROT UR QL STRIP: NEGATIVE
RBC # BLD AUTO: 4.1 10*6/MM3 (ref 3.77–5.28)
RBC # UR: ABNORMAL /HPF
REF LAB TEST METHOD: ABNORMAL
SODIUM SERPL-SCNC: 138 MMOL/L (ref 136–145)
SP GR UR STRIP: 1.01 (ref 1–1.03)
SQUAMOUS #/AREA URNS HPF: ABNORMAL /HPF
UROBILINOGEN UR QL STRIP: ABNORMAL
WBC # BLD AUTO: 7.99 10*3/MM3 (ref 3.4–10.8)
WBC UR QL AUTO: ABNORMAL /HPF

## 2021-05-12 PROCEDURE — 83690 ASSAY OF LIPASE: CPT | Performed by: PHYSICIAN ASSISTANT

## 2021-05-12 PROCEDURE — 99283 EMERGENCY DEPT VISIT LOW MDM: CPT

## 2021-05-12 PROCEDURE — 84703 CHORIONIC GONADOTROPIN ASSAY: CPT | Performed by: PHYSICIAN ASSISTANT

## 2021-05-12 PROCEDURE — 96375 TX/PRO/DX INJ NEW DRUG ADDON: CPT

## 2021-05-12 PROCEDURE — 25010000002 ONDANSETRON PER 1 MG: Performed by: PHYSICIAN ASSISTANT

## 2021-05-12 PROCEDURE — 80053 COMPREHEN METABOLIC PANEL: CPT | Performed by: PHYSICIAN ASSISTANT

## 2021-05-12 PROCEDURE — 96374 THER/PROPH/DIAG INJ IV PUSH: CPT

## 2021-05-12 PROCEDURE — 81001 URINALYSIS AUTO W/SCOPE: CPT | Performed by: PHYSICIAN ASSISTANT

## 2021-05-12 PROCEDURE — 85025 COMPLETE CBC W/AUTO DIFF WBC: CPT | Performed by: PHYSICIAN ASSISTANT

## 2021-05-12 PROCEDURE — 25010000002 KETOROLAC TROMETHAMINE PER 15 MG: Performed by: PHYSICIAN ASSISTANT

## 2021-05-12 RX ORDER — SODIUM CHLORIDE 0.9 % (FLUSH) 0.9 %
10 SYRINGE (ML) INJECTION AS NEEDED
Status: DISCONTINUED | OUTPATIENT
Start: 2021-05-12 | End: 2021-05-13 | Stop reason: HOSPADM

## 2021-05-12 RX ORDER — KETOROLAC TROMETHAMINE 30 MG/ML
30 INJECTION, SOLUTION INTRAMUSCULAR; INTRAVENOUS ONCE
Status: COMPLETED | OUTPATIENT
Start: 2021-05-12 | End: 2021-05-12

## 2021-05-12 RX ORDER — MAGNESIUM CARB/ALUMINUM HYDROX 105-160MG
300 TABLET,CHEWABLE ORAL ONCE
Status: DISCONTINUED | OUTPATIENT
Start: 2021-05-12 | End: 2021-05-13 | Stop reason: HOSPADM

## 2021-05-12 RX ORDER — ONDANSETRON 2 MG/ML
4 INJECTION INTRAMUSCULAR; INTRAVENOUS ONCE
Status: COMPLETED | OUTPATIENT
Start: 2021-05-12 | End: 2021-05-12

## 2021-05-12 RX ORDER — POLYETHYLENE GLYCOL 3350 17 G/17G
17 POWDER, FOR SOLUTION ORAL DAILY
Qty: 30 EACH | Refills: 0 | Status: SHIPPED | OUTPATIENT
Start: 2021-05-12 | End: 2021-06-14

## 2021-05-12 RX ORDER — KETOROLAC TROMETHAMINE 10 MG/1
10 TABLET, FILM COATED ORAL EVERY 6 HOURS PRN
Qty: 12 TABLET | Refills: 0 | Status: SHIPPED | OUTPATIENT
Start: 2021-05-12 | End: 2021-05-19

## 2021-05-12 RX ADMIN — SODIUM CHLORIDE, POTASSIUM CHLORIDE, SODIUM LACTATE AND CALCIUM CHLORIDE 1000 ML: 600; 310; 30; 20 INJECTION, SOLUTION INTRAVENOUS at 21:35

## 2021-05-12 RX ADMIN — KETOROLAC TROMETHAMINE 30 MG: 30 INJECTION, SOLUTION INTRAMUSCULAR; INTRAVENOUS at 22:13

## 2021-05-12 RX ADMIN — ONDANSETRON 4 MG: 2 INJECTION INTRAMUSCULAR; INTRAVENOUS at 21:36

## 2021-05-12 NOTE — ED PROVIDER NOTES
[facility] EMERGENCY DEPT  eMERGENCY dEPARTMENT eNCOUnter      Pt Name: Gerhardt Cart  MRN: [id number]  [place] 1985  Date of evaluation: 2021  Provider: Alberto Brian, [address]       Chief Complaint   Patient presents with    Flank Pain     right sided x 2 days with progressive worsening and nausea         HISTORY OF PRESENT ILLNESS   (Location/Symptom, Timing/Onset,Context/Setting, Quality, Duration, Modifying Factors, Severity)  Note limiting factors. Ken Agee a 30 y.o. female who presents to the emergency department for evaluation of flank pain. Pt tells me that she has had right flank, right lower back and right lower abdominal pain over past 3 days. She relates that abdominal pain is constant and has worsened since onset. She has had no fevers, vomiting or diarrhea. She tells me that she has had discomfort with urination. She gives history of prior  section 8 months ago. [facility]    Nursing Notes were reviewed. REVIEW OF SYSTEMS    (2-9 systems for level 4, 10 or more for level 5)     Review of Systems   Constitutional: Positive for fever. Gastrointestinal: Positive for abdominal pain and nausea. Negative for diarrhea and vomiting. All other systems reviewed and are negative. A complete review of systems was performed and is negative except as noted above in the HPI. PAST MEDICAL HISTORY     Past Medical History:   Diagnosis Date    Hemorrhoids          SURGICAL HISTORY       Past Surgical History:   Procedure Laterality Date    BREAST SURGERY      implants     SECTION N/A 2020     SECTION performed by Bradley Lee MD at [facility] OR    LIP REPAIR           CURRENT MEDICATIONS       Discharge Medication List as of 2021 11:57 PM          ALLERGIES     Patient has no known allergies.     FAMILY HISTORY       Family History   Problem Relation Age of Onset    Colon Cancer Neg Hx     Colon Polyps Neg Hx     Esophageal Cancer Neg Hx     Liver Cancer Neg Hx     Liver Disease Neg Hx     Rectal Cancer Neg Hx     Stomach Cancer Neg Hx           SOCIAL HISTORY       Social History     Socioeconomic History    Marital status:      Spouse name: None    Number of children: None    Years of education: None    Highest education level: None   Occupational History    None   Social Needs    Financial resource strain: None    Food insecurity     Worry: None     Inability: None    Transportation needs     Medical: None     Non-medical: None   Tobacco Use    Smoking status: Never Smoker    Smokeless tobacco: Never Used   Substance and Sexual Activity    Alcohol use: No    Drug use: No    Sexual activity: Yes     Partners: Male   Lifestyle    Physical activity     Days per week: None     Minutes per session: None    Stress: None   Relationships    Social connections     Talks on phone: None     Gets together: None     Attends Yarsani service: None     Active member of club or organization: None     Attends meetings of clubs or organizations: None     Relationship status: None    Intimate partner violence     Fear of current or ex partner: None     Emotionally abused: None     Physically abused: None     Forced sexual activity: None   Other Topics Concern    None   Social History Narrative    None       SCREENINGS             PHYSICAL EXAM    (up to 7 for level 4, 8 or more for level 5)     ED Triage Vitals [05/11/21 1949]   BP Temp Temp Source Pulse Resp SpO2 Height Weight   116/77 97.9 °F (36.6 °C) Temporal 82 17 98 % 5' 2\" (1.575 m) 115 lb (52.2 kg)       Physical Exam  Vitals signs reviewed. HENT:      Head: Normocephalic. Right Ear: External ear normal.      Left Ear: External ear normal.   Eyes:      Conjunctiva/sclera: Conjunctivae normal.      Pupils: Pupils are equal, round, and reactive to light. Neck:      Musculoskeletal: Normal range of motion.    Cardiovascular:      Rate and Rhythm: Normal rate and regular rhythm. Heart sounds: Normal heart sounds. Pulmonary:      Effort: Pulmonary effort is normal.      Breath sounds: Normal breath sounds. Abdominal:      General: Bowel sounds are normal.      Palpations: Abdomen is soft. Tenderness: There is abdominal tenderness (diffuse ttp RLQ>LLQ). Musculoskeletal: Normal range of motion. Skin:     General: Skin is warm and dry. Neurological:      Mental Status: She is alert and oriented to person, place, and time.          DIAGNOSTIC RESULTS     EKG: All EKG's are interpreted by the Emergency Department Physician who either signs or Co-signs this chart in the absence of acardiologist.        RADIOLOGY:   Non-plain film images such as CT, Ultrasound andMRI are read by the radiologist. Plain radiographic images are visualized and preliminarily interpreted by the emergency physician with the below findings:        Interpretation per the Radiologist below, if available at the time of this note:    CT ABDOMEN PELVIS W IV CONTRAST Additional Contrast? None    (Results Pending)         ED BEDSIDE ULTRASOUND:   Performed by ED Physician - none    LABS:  Labs Reviewed   CBC WITH AUTO DIFFERENTIAL - Abnormal; Notable for the following components:       Result Value    Monocytes Absolute 1.00 (*)     All other components within normal limits   COMPREHENSIVE METABOLIC PANEL W/ REFLEX TO MG FOR LOW K - Abnormal; Notable for the following components:    Sodium 135 (*)     All other components within normal limits   URINE RT REFLEX TO CULTURE - Abnormal; Notable for the following components:    Leukocyte Esterase, Urine MODERATE (*)     All other components within normal limits   MICROSCOPIC URINALYSIS - Abnormal; Notable for the following components:    Bacteria, UA None Seen (*)     Crystals, UA NEG (*)     All other components within normal limits   C.TRACHOMATIS N.GONORRHOEAE DNA   CULTURE, URINE   HCG, SERUM, QUALITATIVE   LIPASE       All other labs were within normal range or not returned as of this dictation. RE-ASSESSMENT     CT ABDOMEN & PELVIS With Contrast:    No acute intra-abdominal process. 12 mm nonspecific hypervascular lesion or perfusion anomaly in the left lobe of the liver. Solid organs otherwise unremarkable. No calcified gallstones. No biliary dilatation. No bowel obstruction. Unremarkable appendix. Moderate stool volume throughout the colon. Unremarkable uterus and adnexa. No acute osseous abnormality. EMERGENCY DEPARTMENT COURSE and DIFFERENTIALDIAGNOSIS/MDM:   Vitals:    Vitals:    05/11/21 1949   BP: 116/77   Pulse: 82   Resp: 17   Temp: 97.9 °F (36.6 °C)   TempSrc: Temporal   SpO2: 98%   Weight: 115 lb (52.2 kg)   Height: 5' 2\" (1.575 m)       MDM      CONSULTS:  None    PROCEDURES:  Unless otherwise notedbelow, none     Procedures    FINAL IMPRESSION     1. Flank pain    2. Acute cystitis without hematuria    3. Liver lesion          DISPOSITION/PLAN   DISPOSITION Decision To Discharge 05/11/2021 11:37:28 PM      PATIENT REFERRED TO:  [facility]  [address] [place] [address]  [phone]    Schedule an appointment as soon as possible for a visit   follow up US of liver      DISCHARGE MEDICATIONS:    Attestation: The Prescription Monitoring Report for this patient was reviewed today. ([id number]) SHAYNE Cade)  Periodic Controlled Substance Monitoring: Possible medication side effects, risk of tolerance/dependence & alternative treatments discussed., No signs of potential drug abuse or diversion identified. SHAYNE Cade)       Medication List      START taking these medications    cephALEXin 500 MG capsule  Commonly known as: Keflex  Take 1 capsule by mouth 2 times daily for 5 days     HYDROcodone-acetaminophen 5-325 MG per tablet  Commonly known as: Norco  Take 1 tablet by mouth every 6 hours as needed for Pain for up to 3 days. Intended supply: 3 days.  Take lowest dose possible to manage pain     ondansetron 4 MG disintegrating tablet  Commonly known as: Zofran ODT  Take 1 tablet by mouth every 8 hours as needed for Nausea or Vomiting           Where to Get Your Medications      These medications were sent to [facility], [address] [address]    Hours: 24-hours Phone: [phone]   · cephALEXin 500 MG capsule  · HYDROcodone-acetaminophen 5-325 MG per tablet  · ondansetron 4 MG disintegrating tablet           (Pleasenote that portions of this note were completed with a voice recognition program.  Efforts were made to edit the dictations but occasionally words are mis-transcribed.)              Ion Roberts, APRN  05/12/21 0012

## 2021-05-14 LAB
CHLAMYDIA TRACHOMATIS AMPLIFIED DET: NEGATIVE
N GONORRHOEAE AMPLIFIED DET: NEGATIVE
ORGANISM: ABNORMAL
SPECIMEN SOURCE: NORMAL
URINE CULTURE, ROUTINE: ABNORMAL
URINE CULTURE, ROUTINE: ABNORMAL

## 2021-05-19 ENCOUNTER — OFFICE VISIT (OUTPATIENT)
Dept: INTERNAL MEDICINE | Facility: CLINIC | Age: 36
End: 2021-05-19

## 2021-05-19 ENCOUNTER — OFFICE VISIT (OUTPATIENT)
Dept: OTOLARYNGOLOGY | Facility: CLINIC | Age: 36
End: 2021-05-19

## 2021-05-19 VITALS
RESPIRATION RATE: 15 BRPM | HEIGHT: 63 IN | WEIGHT: 133.6 LBS | BODY MASS INDEX: 23.67 KG/M2 | DIASTOLIC BLOOD PRESSURE: 70 MMHG | HEART RATE: 66 BPM | OXYGEN SATURATION: 99 % | TEMPERATURE: 98.2 F | SYSTOLIC BLOOD PRESSURE: 110 MMHG

## 2021-05-19 VITALS
SYSTOLIC BLOOD PRESSURE: 124 MMHG | TEMPERATURE: 98 F | HEART RATE: 72 BPM | DIASTOLIC BLOOD PRESSURE: 76 MMHG | HEIGHT: 63 IN | BODY MASS INDEX: 23.92 KG/M2 | WEIGHT: 135 LBS | RESPIRATION RATE: 20 BRPM

## 2021-05-19 DIAGNOSIS — L03.818 CELLULITIS OF OTHER SPECIFIED SITE: Primary | ICD-10-CM

## 2021-05-19 DIAGNOSIS — I78.1 CAPILLARY HEMANGIOMA: ICD-10-CM

## 2021-05-19 DIAGNOSIS — L90.5 SCAR: ICD-10-CM

## 2021-05-19 DIAGNOSIS — R10.31 RLQ ABDOMINAL PAIN: ICD-10-CM

## 2021-05-19 DIAGNOSIS — K59.04 CHRONIC IDIOPATHIC CONSTIPATION: Primary | ICD-10-CM

## 2021-05-19 DIAGNOSIS — R10.11 RUQ PAIN: ICD-10-CM

## 2021-05-19 PROCEDURE — 99204 OFFICE O/P NEW MOD 45 MIN: CPT | Performed by: INTERNAL MEDICINE

## 2021-05-19 PROCEDURE — 87070 CULTURE OTHR SPECIMN AEROBIC: CPT | Performed by: OTOLARYNGOLOGY

## 2021-05-19 PROCEDURE — 87186 SC STD MICRODIL/AGAR DIL: CPT | Performed by: OTOLARYNGOLOGY

## 2021-05-19 PROCEDURE — 99214 OFFICE O/P EST MOD 30 MIN: CPT | Performed by: OTOLARYNGOLOGY

## 2021-05-19 PROCEDURE — 87205 SMEAR GRAM STAIN: CPT | Performed by: OTOLARYNGOLOGY

## 2021-05-19 PROCEDURE — 87147 CULTURE TYPE IMMUNOLOGIC: CPT | Performed by: OTOLARYNGOLOGY

## 2021-05-19 RX ORDER — ONDANSETRON 4 MG/1
4 TABLET, FILM COATED ORAL EVERY 8 HOURS PRN
Qty: 30 TABLET | Refills: 2 | Status: SHIPPED | OUTPATIENT
Start: 2021-05-19

## 2021-05-19 RX ORDER — PSYLLIUM SEED (WITH DEXTROSE)
1 POWDER (GRAM) ORAL 2 TIMES DAILY
COMMUNITY
End: 2021-06-14

## 2021-05-19 RX ORDER — KETOROLAC TROMETHAMINE 10 MG/1
10 TABLET, FILM COATED ORAL EVERY 8 HOURS PRN
Qty: 15 TABLET | Refills: 0 | Status: SHIPPED | OUTPATIENT
Start: 2021-05-19 | End: 2021-05-24

## 2021-05-19 RX ORDER — DIPHENOXYLATE HYDROCHLORIDE AND ATROPINE SULFATE 2.5; .025 MG/1; MG/1
1 TABLET ORAL DAILY
COMMUNITY
End: 2023-03-15

## 2021-05-19 RX ORDER — SULFAMETHOXAZOLE AND TRIMETHOPRIM 800; 160 MG/1; MG/1
1 TABLET ORAL 2 TIMES DAILY
Qty: 20 TABLET | Refills: 0 | Status: SHIPPED | OUTPATIENT
Start: 2021-05-19 | End: 2021-05-29

## 2021-05-19 RX ORDER — KETOROLAC TROMETHAMINE 10 MG/1
10 TABLET, FILM COATED ORAL EVERY 8 HOURS PRN
Qty: 60 TABLET | Refills: 2 | Status: SHIPPED | OUTPATIENT
Start: 2021-05-19 | End: 2021-05-19

## 2021-05-19 NOTE — PROGRESS NOTES
Chief Complaint   Patient presents with   • Establish Care   • Abdominal Pain     Sharp Pain         History:  Luh Tapia is a 35 y.o. female who presents today for evaluation of the above problems.      She recently scratch that she presents today to establish care.  She recently went to the emergency room on May 12, 2021 for abdominal pain on the right side.  The week prior she had diarrhea and developed constipation after the onset of the pain.  She went to emergency room at [facility] and had a normal CT scan.  She was diagnosed with a urinary tract infection and given antibiotics.  A liver ultrasound was ordered by her previous doctor was out of the network and needed a referral to a new one.  I reviewed the CT scan from May 12.  She had no acute inflammatory process within the abdomen pelvis.  There was moderate stool within the colon.  On May 3, 2021 she had transvaginal ultrasound turning normal appearance of the uterus and ovaries.    She reports having severe constipation since the age of 17.  She has tried numerous medications over-the-counter which have not helped.  She also has had chronic right lower quadrant and chronic right upper quadrant abdominal pain since 2015, but this pain had worsened over the last week or so.  She has had issues with hemorrhoids related to the severe constipation and was scheduled for hemorrhoidectomy in early April but she was Covid positive.  The surgery has not been rescheduled.    The pain is still there although improving.  She has bowel movements every 2 to 3 days and when she does they are described as liquid.    She takes Metamucil, fiber tablets, recently started MiraLAX twice a day and over-the-counter Colace 3-4 times per day.  She also will take potassium bicarbonate and magnesium gluconate.  She eats fruits vegetables and does not eat any red meat.  She drinks at least 3 L of water per day, does not eat sweets and works out frequently.    She is concerned  because her family has inflammatory bowel disease.    She does report that her pain essentially resolved with Toradol and Zofran in the emergency room.    HPI    ROS:  Review of Systems   Constitutional: Negative for fatigue and fever.   Respiratory: Negative for cough and shortness of breath.    Cardiovascular: Negative.    Gastrointestinal: Positive for abdominal pain, constipation and diarrhea. Negative for blood in stool, nausea and vomiting.         Current Outpatient Medications:   •  Acetaminophen (TYLENOL 8 HOUR PO), Take 1 tablet by mouth 3 (Three) Times a Day As Needed., Disp: , Rfl:   •  Docusate Sodium (COLACE PO), Take 1 tablet by mouth As Needed., Disp: , Rfl:   •  MAGNESIUM GLUCONATE PO, Take 1 tablet by mouth Daily., Disp: , Rfl:   •  multivitamin (Daily Vitamins) tablet tablet, Take 1 tablet by mouth Daily., Disp: , Rfl:   •  polyethylene glycol (MIRALAX) 17 g packet, Take 17 g by mouth Daily., Disp: 30 each, Rfl: 0  •  POTASSIUM BICARBONATE PO, Take 1 tablet by mouth Daily., Disp: , Rfl:   •  Probiotic Product (PROBIOTIC ADVANCED PO), Take 1 tablet by mouth Daily., Disp: , Rfl:   •  Psyllium (Metamucil) wafer wafer, Take 1 wafer by mouth 2 (Two) Times a Day., Disp: , Rfl:   •  ketorolac (TORADOL) 10 MG tablet, Take 1 tablet by mouth Every 8 (Eight) Hours As Needed for Moderate Pain  for up to 5 days., Disp: 15 tablet, Rfl: 0  •  linaclotide (LINZESS) 145 MCG capsule capsule, Take 1 capsule by mouth Every Morning Before Breakfast., Disp: 30 capsule, Rfl: 5  •  ondansetron (Zofran) 4 MG tablet, Take 1 tablet by mouth Every 8 (Eight) Hours As Needed for Nausea or Vomiting., Disp: 30 tablet, Rfl: 2  •  sulfamethoxazole-trimethoprim (Bactrim DS) 800-160 MG per tablet, Take 1 tablet by mouth 2 (Two) Times a Day for 10 days., Disp: 20 tablet, Rfl: 0    Lab Results   Component Value Date    GLUCOSE 95 05/12/2021    BUN 17 05/12/2021    CREATININE 0.69 05/12/2021    EGFRIFNONA 97 05/12/2021    EGFRIFAFRI  118 05/12/2021    BCR 24.6 05/12/2021    K 4.3 05/12/2021    CO2 25.0 05/12/2021    CALCIUM 9.3 05/12/2021    ALBUMIN 3.90 05/12/2021    AST 15 05/12/2021    ALT 13 05/12/2021       WBC   Date Value Ref Range Status   05/12/2021 7.99 3.40 - 10.80 10*3/mm3 Final   05/11/2021 10.2 4.8 - 10.8 K/uL Final     RBC   Date Value Ref Range Status   05/12/2021 4.10 3.77 - 5.28 10*6/mm3 Final   05/11/2021 4.55 4.20 - 5.40 M/uL Final     Hemoglobin   Date Value Ref Range Status   05/12/2021 12.4 12.0 - 15.9 g/dL Final   05/11/2021 14.0 12.0 - 16.0 g/dL Final     Hematocrit   Date Value Ref Range Status   05/12/2021 36.4 34.0 - 46.6 % Final   05/11/2021 41.7 37.0 - 47.0 % Final     MCV   Date Value Ref Range Status   05/12/2021 88.8 79.0 - 97.0 fL Final   05/11/2021 91.6 81.0 - 99.0 fL Final     MCH   Date Value Ref Range Status   05/12/2021 30.2 26.6 - 33.0 pg Final   05/11/2021 30.8 27.0 - 31.0 pg Final     MCHC   Date Value Ref Range Status   05/12/2021 34.1 31.5 - 35.7 g/dL Final   05/11/2021 33.6 33.0 - 37.0 g/dL Final     RDW   Date Value Ref Range Status   05/12/2021 12.8 12.3 - 15.4 % Final   05/11/2021 12.7 11.5 - 14.5 % Final     RDW-SD   Date Value Ref Range Status   05/12/2021 41.8 37.0 - 54.0 fl Final     MPV   Date Value Ref Range Status   05/12/2021 10.1 6.0 - 12.0 fL Final   05/11/2021 9.8 9.4 - 12.3 fL Final     Platelets   Date Value Ref Range Status   05/12/2021 241 140 - 450 10*3/mm3 Final   05/11/2021 256 130 - 400 K/uL Final     Neutrophil Rel %   Date Value Ref Range Status   05/11/2021 64.4 50.0 - 65.0 % Final     Neutrophil %   Date Value Ref Range Status   05/12/2021 57.5 42.7 - 76.0 % Final     Lymphocyte Rel %   Date Value Ref Range Status   05/11/2021 23.9 20.0 - 40.0 % Final     Lymphocyte %   Date Value Ref Range Status   05/12/2021 29.7 19.6 - 45.3 % Final     Monocyte Rel %   Date Value Ref Range Status   05/11/2021 9.4 0.0 - 10.0 % Final     Monocyte %   Date Value Ref Range Status   05/12/2021  "9.8 5.0 - 12.0 % Final     Eosinophil Rel %   Date Value Ref Range Status   05/11/2021 1.7 0.0 - 5.0 % Final     Eosinophil %   Date Value Ref Range Status   05/12/2021 2.3 0.3 - 6.2 % Final     Basophil Rel %   Date Value Ref Range Status   05/11/2021 0.4 0.0 - 1.0 % Final     Basophil %   Date Value Ref Range Status   05/12/2021 0.4 0.0 - 1.5 % Final     Immature Grans %   Date Value Ref Range Status   05/12/2021 0.3 0.0 - 0.5 % Final     Neutrophils Absolute   Date Value Ref Range Status   05/11/2021 6.6 1.5 - 7.5 K/uL Final     Neutrophils, Absolute   Date Value Ref Range Status   05/12/2021 4.61 1.70 - 7.00 10*3/mm3 Final     Lymphocytes Absolute   Date Value Ref Range Status   05/11/2021 2.4 1.1 - 4.5 K/uL Final     Lymphocytes, Absolute   Date Value Ref Range Status   05/12/2021 2.37 0.70 - 3.10 10*3/mm3 Final     Monocytes Absolute   Date Value Ref Range Status   05/11/2021 1.00 (H) 0.00 - 0.90 K/uL Final     Monocytes, Absolute   Date Value Ref Range Status   05/12/2021 0.78 0.10 - 0.90 10*3/mm3 Final     Eosinophils Absolute   Date Value Ref Range Status   05/11/2021 0.20 0.00 - 0.60 K/uL Final     Eosinophils, Absolute   Date Value Ref Range Status   05/12/2021 0.18 0.00 - 0.40 10*3/mm3 Final     Basophils Absolute   Date Value Ref Range Status   05/11/2021 0.00 0.00 - 0.20 K/uL Final     Basophils, Absolute   Date Value Ref Range Status   05/12/2021 0.03 0.00 - 0.20 10*3/mm3 Final     Immature Grans, Absolute   Date Value Ref Range Status   05/12/2021 0.02 0.00 - 0.05 10*3/mm3 Final   05/11/2021 0.0 K/uL Final     nRBC   Date Value Ref Range Status   05/12/2021 0.0 0.0 - 0.2 /100 WBC Final         OBJECTIVE:  Visit Vitals  /70 (BP Location: Left arm, Patient Position: Sitting, Cuff Size: Adult)   Pulse 66   Temp 98.2 °F (36.8 °C) (Oral)   Resp 15   Ht 160 cm (62.99\")   Wt 60.6 kg (133 lb 9.6 oz)   SpO2 99%   BMI 23.67 kg/m²      Physical Exam  Constitutional:       General: She is not in acute " distress.     Appearance: She is well-developed. She is not diaphoretic.   HENT:      Head: Normocephalic and atraumatic.   Eyes:      Pupils: Pupils are equal, round, and reactive to light.   Neck:      Thyroid: No thyromegaly.      Trachea: Phonation normal.   Cardiovascular:      Rate and Rhythm: Normal rate and regular rhythm.      Heart sounds: No murmur heard.     Pulmonary:      Effort: Pulmonary effort is normal. No respiratory distress.      Breath sounds: Normal breath sounds. No wheezing or rales.   Abdominal:      General: Abdomen is flat. Bowel sounds are normal. There is no distension.      Palpations: Abdomen is soft.      Tenderness: There is abdominal tenderness (Moderate to severe right lower quadrant and right upper quadrant abdominal pain) in the right upper quadrant and right lower quadrant. There is no guarding or rebound. Negative signs include Chase's sign.      Hernia: No hernia is present.   Skin:     Coloration: Skin is not pale.      Findings: No erythema.   Neurological:      Mental Status: She is alert.   Psychiatric:         Behavior: Behavior normal.         Thought Content: Thought content normal.         Judgment: Judgment normal.         Assessment/Plan    Diagnoses and all orders for this visit:    1. Chronic idiopathic constipation (Primary)  -     linaclotide (LINZESS) 145 MCG capsule capsule; Take 1 capsule by mouth Every Morning Before Breakfast.  Dispense: 30 capsule; Refill: 5  -     Discontinue: ketorolac (TORADOL) 10 MG tablet; Take 1 tablet by mouth Every 8 (Eight) Hours As Needed for Moderate Pain .  Dispense: 60 tablet; Refill: 2  -     ketorolac (TORADOL) 10 MG tablet; Take 1 tablet by mouth Every 8 (Eight) Hours As Needed for Moderate Pain  for up to 5 days.  Dispense: 15 tablet; Refill: 0    2. RLQ abdominal pain  -     US Abdomen Complete; Future  -     Discontinue: ketorolac (TORADOL) 10 MG tablet; Take 1 tablet by mouth Every 8 (Eight) Hours As Needed for  Moderate Pain .  Dispense: 60 tablet; Refill: 2  -     ketorolac (TORADOL) 10 MG tablet; Take 1 tablet by mouth Every 8 (Eight) Hours As Needed for Moderate Pain  for up to 5 days.  Dispense: 15 tablet; Refill: 0    3. RUQ pain  -     US Abdomen Complete; Future  -     Discontinue: ketorolac (TORADOL) 10 MG tablet; Take 1 tablet by mouth Every 8 (Eight) Hours As Needed for Moderate Pain .  Dispense: 60 tablet; Refill: 2  -     ketorolac (TORADOL) 10 MG tablet; Take 1 tablet by mouth Every 8 (Eight) Hours As Needed for Moderate Pain  for up to 5 days.  Dispense: 15 tablet; Refill: 0    Other orders  -     ondansetron (Zofran) 4 MG tablet; Take 1 tablet by mouth Every 8 (Eight) Hours As Needed for Nausea or Vomiting.  Dispense: 30 tablet; Refill: 2      She has chronic idiopathic constipation despite numerous over-the-counter medications continues with symptoms.  I would like to try Linzess.  The abdominal pain may be related to her constipation, but also could be related to liver or gallbladder issue such as cholelithiasis.  Going to obtain right upper quadrant abdominal ultrasound for further work-up.  She did recently have a pelvic ultrasound which was unremarkable as well.      As she noted improvement in the abdominal pain with IV Toradol going to prescribe 5 days of oral Toradol.  Initially, I did send in 20 days with a couple refills.  After talking with the pharmacist we changed to 5 days without any refills given the US boxed warning.  We called her pharmacy back and to tell them to change the prescription to 5 days without refills.      Would like to see her again in 4 weeks to follow-up on her symptoms.    Return in about 4 weeks (around 6/16/2021) for Recheck.    Patient was given instructions and counseling regarding his/her condition or for health maintenance advice. Please see specific information pulled into the AVS if appropriate.     LAURA Ybarra MD   14:08 CDT  5/19/2021

## 2021-05-19 NOTE — PROGRESS NOTES
PRIMARY CARE PROVIDER: THEO Ybarra MD  REFERRING PROVIDER: No ref. provider found    Chief Complaint   Patient presents with   • Follow-up     hemangioma capillary       Subjective   History of Present Illness:  Luh Tapia is a  35 y.o. female returns for evaluation and treatment of persistent drainage from the upper lip.  When I saw her on April 12, 2021, she reported persistent drainage from the upper lip.  This had resolved in the past with cryotherapy.  I performed silver nitrate cautery to the area on April 12, 2021.  Since then, the area has continued to drain and is tender.  She is curious about laser options for her hemangioma.  She has had Juvéderm to the lip in the past.    History:  When she presented for my initial evaluation on August 23, 2017, the following was the history:  She states this area has been excised 3 times in [place] prior to the age of 13. She was told this was a hemangioma. Dr. Lazo in [place] excised this area again in 2003 removing scarring. She states that Dr. Lazo accidentally incised the white lip in vertical fashion.  She was sent to [facility] for potential embolization, but there was no significant feeding vessel to embolize.       Review of Systems:  Review of Systems   Constitutional: Negative for chills, fatigue, fever and unexpected weight change.   HENT: Negative for facial swelling.    Respiratory: Negative for cough, chest tightness and shortness of breath.    Cardiovascular: Negative for chest pain.   Musculoskeletal: Negative for neck pain.   Skin: Negative for color change.   Neurological: Negative for facial asymmetry.   Hematological: Negative for adenopathy. Does not bruise/bleed easily.       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    Right Upper Lip   • PONV (postoperative nausea and vomiting)     EXTREME     Past Surgical History:   Procedure Laterality Date   • BREAST SURGERY Bilateral  2014    IMPLANTS    •  SECTION     • HEAD/NECK LESION/CYST EXCISION Bilateral 2017    Procedure: Excision of neoplasm of uncertain behavior of the upper lip with linear closure (96248);  Surgeon: Chris Ham MD;  Location: [facility] OR;  Service:    • LIP REPAIR      OVER LAST 26 YEARS HAS HAD 4 OTHER SURGERIES ON LIP   • OTHER SURGICAL HISTORY      Lip     Family History   Problem Relation Age of Onset   • No Known Problems Mother    • No Known Problems Father      Social History     Tobacco Use   • Smoking status: Never Smoker   • Smokeless tobacco: Never Used   Substance Use Topics   • Alcohol use: No   • Drug use: No     Allergies:  Patient has no known allergies.    Current Outpatient Medications:   •  Acetaminophen (TYLENOL 8 HOUR PO), Take 1 tablet by mouth 3 (Three) Times a Day As Needed., Disp: , Rfl:   •  Docusate Sodium (COLACE PO), Take 1 tablet by mouth As Needed., Disp: , Rfl:   •  ketorolac (TORADOL) 10 MG tablet, Take 1 tablet by mouth Every 8 (Eight) Hours As Needed for Moderate Pain ., Disp: 60 tablet, Rfl: 2  •  linaclotide (LINZESS) 145 MCG capsule capsule, Take 1 capsule by mouth Every Morning Before Breakfast., Disp: 30 capsule, Rfl: 5  •  MAGNESIUM GLUCONATE PO, Take 1 tablet by mouth Daily., Disp: , Rfl:   •  multivitamin (Daily Vitamins) tablet tablet, Take 1 tablet by mouth Daily., Disp: , Rfl:   •  ondansetron (Zofran) 4 MG tablet, Take 1 tablet by mouth Every 8 (Eight) Hours As Needed for Nausea or Vomiting., Disp: 30 tablet, Rfl: 2  •  polyethylene glycol (MIRALAX) 17 g packet, Take 17 g by mouth Daily., Disp: 30 each, Rfl: 0  •  POTASSIUM BICARBONATE PO, Take 1 tablet by mouth Daily., Disp: , Rfl:   •  Probiotic Product (PROBIOTIC ADVANCED PO), Take 1 tablet by mouth Daily., Disp: , Rfl:   •  Psyllium (Metamucil) wafer wafer, Take 1 wafer by mouth 2 (Two) Times a Day., Disp: , Rfl:       Objective     Vital Signs:  Temp:  [98 °F (36.7 °C)-98.2 °F (36.8 °C)] 98 °F (36.7  °C)  Heart Rate:  [66-72] 72  Resp:  [15-20] 20  BP: (110-124)/(70-76) 124/76    Physical Exam:  Physical Exam  Vitals and nursing note reviewed.   Constitutional:       General: She is not in acute distress.     Appearance: She is well-developed. She is not diaphoretic.   HENT:      Head: Normocephalic and atraumatic.      Right Ear: External ear normal.      Left Ear: External ear normal.      Nose: Nose normal.      Mouth/Throat:     Eyes:      General: No scleral icterus.        Right eye: No discharge.         Left eye: No discharge.      Conjunctiva/sclera: Conjunctivae normal.      Pupils: Pupils are equal, round, and reactive to light.   Neck:      Thyroid: No thyromegaly.      Vascular: No JVD.      Trachea: No tracheal deviation.   Pulmonary:      Effort: Pulmonary effort is normal.      Breath sounds: No stridor.   Musculoskeletal:         General: No deformity. Normal range of motion.      Cervical back: Normal range of motion and neck supple.   Lymphadenopathy:      Cervical: No cervical adenopathy.   Skin:     General: Skin is warm and dry.      Coloration: Skin is not pale.      Findings: No erythema or rash.   Neurological:      Mental Status: She is alert and oriented to person, place, and time.      Cranial Nerves: No cranial nerve deficit.      Coordination: Coordination normal.   Psychiatric:         Speech: Speech normal.         Behavior: Behavior normal. Behavior is cooperative.         Thought Content: Thought content normal.         Judgment: Judgment normal.           Assessment   Assessment:  1. Cellulitis of other specified site    2. Capillary hemangioma    3. Scar        Plan   Plan:  I did obtain a culture to assess for any underlying bacterial infection negative For drainage.  On the last few visits, I have not been able to see any actual drainage from the area.  I will send her a prescription of Bactrim.  I did retreat with silver nitrate, and offered cryotherapy.  To proceed with  the silver nitrate.  She was questioning about laser therapy to the lip, which I reported that we do not have decent vascular lasers such as YAG or pulsed dye here in [place].  We are limited to CO2 laser is an intense pulsed light, which would provide much more superficial treatment.  I have offered a referral for a second opinion on revision surgery or potential laser treatment, which she is interested in proceeding with.  I am not aware of any studies of beta blockers in adults with hemangiomas.    No orders of the defined types were placed in this encounter.    Patient Instructions        CONTACT INFORMATION:  The main office phone number is [phone]. For emergencies after hours and on weekends, this number will convert over to our answering service and the on call provider will answer. Please try to keep non emergent phone calls/ questions to office hours 9am-5pm Monday through Friday.     [employer]  As an alternative, you can sign up and use the Epic MyChart system for more direct and quicker access for non emergent questions/ problems.  [employer] allows you to send messages to your doctor, view your test results, renew your prescriptions, schedule appointments, and more. To sign up, go to [employer] and click on the Sign Up Now link in the New User? box. Enter your [employer] Activation Code exactly as it appears below along with the last four digits of your Social Security Number and your Date of Birth () to complete the sign-up process. If you do not sign up before the expiration date, you must request a new code.    [employer] Activation Code: [email or web address]  Expires: 2021 10:09 PM    If you have questions, you can email [email or web address] or call [phone] to talk to our [employer] staff. Remember, [employer] is NOT to be used for urgent needs. For medical emergencies, dial 911.          No follow-ups on file.    My findings and recommendations were discussed and  questions were answered.     Chris Ham MD  05/19/21  15:31 CDT

## 2021-05-19 NOTE — PATIENT INSTRUCTIONS
CONTACT INFORMATION:  The main office phone number is [phone]. For emergencies after hours and on weekends, this number will convert over to our answering service and the on call provider will answer. Please try to keep non emergent phone calls/ questions to office hours 9am-5pm Monday through Friday.     [employer]  As an alternative, you can sign up and use the Epic MyChart system for more direct and quicker access for non emergent questions/ problems.  [facility] [employer] allows you to send messages to your doctor, view your test results, renew your prescriptions, schedule appointments, and more. To sign up, go to [employer] and click on the Sign Up Now link in the New User? box. Enter your [employer] Activation Code exactly as it appears below along with the last four digits of your Social Security Number and your Date of Birth () to complete the sign-up process. If you do not sign up before the expiration date, you must request a new code.    [employer] Activation Code: [email or web address]  Expires: 2021 10:09 PM    If you have questions, you can email [email or web address] or call [phone] to talk to our [employer] staff. Remember, [employer] is NOT to be used for urgent needs. For medical emergencies, dial 911.

## 2021-05-22 LAB
BACTERIA SPEC AEROBE CULT: ABNORMAL
GRAM STN SPEC: ABNORMAL

## 2021-06-02 ENCOUNTER — APPOINTMENT (OUTPATIENT)
Dept: ULTRASOUND IMAGING | Facility: HOSPITAL | Age: 36
End: 2021-06-02

## 2021-06-14 ENCOUNTER — LAB (OUTPATIENT)
Dept: LAB | Facility: HOSPITAL | Age: 36
End: 2021-06-14

## 2021-06-14 ENCOUNTER — OFFICE VISIT (OUTPATIENT)
Dept: INTERNAL MEDICINE | Facility: CLINIC | Age: 36
End: 2021-06-14

## 2021-06-14 VITALS
SYSTOLIC BLOOD PRESSURE: 106 MMHG | OXYGEN SATURATION: 100 % | TEMPERATURE: 98.6 F | HEART RATE: 81 BPM | HEIGHT: 63 IN | BODY MASS INDEX: 23.04 KG/M2 | RESPIRATION RATE: 16 BRPM | WEIGHT: 130 LBS | DIASTOLIC BLOOD PRESSURE: 76 MMHG

## 2021-06-14 DIAGNOSIS — R30.0 DYSURIA: ICD-10-CM

## 2021-06-14 DIAGNOSIS — R10.9 FLANK PAIN: ICD-10-CM

## 2021-06-14 DIAGNOSIS — R10.9 FLANK PAIN: Primary | ICD-10-CM

## 2021-06-14 PROCEDURE — 99213 OFFICE O/P EST LOW 20 MIN: CPT | Performed by: NURSE PRACTITIONER

## 2021-06-14 PROCEDURE — 81003 URINALYSIS AUTO W/O SCOPE: CPT

## 2021-06-14 RX ORDER — PHENAZOPYRIDINE HYDROCHLORIDE 200 MG/1
200 TABLET, FILM COATED ORAL 3 TIMES DAILY PRN
Qty: 10 TABLET | Refills: 0 | Status: SHIPPED | OUTPATIENT
Start: 2021-06-14

## 2021-06-14 RX ORDER — NITROFURANTOIN 25; 75 MG/1; MG/1
100 CAPSULE ORAL 2 TIMES DAILY
Qty: 10 CAPSULE | Refills: 0 | Status: SHIPPED | OUTPATIENT
Start: 2021-06-14 | End: 2021-06-19

## 2021-06-14 NOTE — PROGRESS NOTES
Chief Complaint   Patient presents with   • Flank Pain     LEFT SIDE, SEVERE PAIN AT 3 AM, BURNING DURING URINATION       History:  Luh Tapia is a 35 y.o. female who presents today for follow-up for evaluation of the above:    HPI   Patient presents today with c/o uti symptoms.   C/o DYSURIA AND FLANK PAIN bilaterally for a week.   No hematuria.  No fever.           ROS:  Review of Systems   Constitutional: Negative for fatigue and unexpected weight change.   HENT: Negative.    Eyes: Negative.    Respiratory: Negative.    Cardiovascular: Negative.    Gastrointestinal: Negative for abdominal pain, constipation and diarrhea.   Endocrine: Negative.    Genitourinary: Positive for dysuria, flank pain and pelvic pain. Negative for difficulty urinating, dyspareunia, genital sores, menstrual problem, vaginal bleeding, vaginal discharge and vaginal pain.   Skin: Negative.    Neurological: Negative.    Psychiatric/Behavioral: Negative.        Ms. Tapia  reports that she has never smoked. She has never used smokeless tobacco. She reports that she does not drink alcohol and does not use drugs.      Current Outpatient Medications:   •  linaclotide (LINZESS) 145 MCG capsule capsule, Take 1 capsule by mouth Every Morning Before Breakfast., Disp: 30 capsule, Rfl: 5  •  multivitamin (Daily Vitamins) tablet tablet, Take 1 tablet by mouth Daily., Disp: , Rfl:   •  ondansetron (Zofran) 4 MG tablet, Take 1 tablet by mouth Every 8 (Eight) Hours As Needed for Nausea or Vomiting., Disp: 30 tablet, Rfl: 2  •  Probiotic Product (PROBIOTIC ADVANCED PO), Take 1 tablet by mouth Daily., Disp: , Rfl:   •  nitrofurantoin, macrocrystal-monohydrate, (Macrobid) 100 MG capsule, Take 1 capsule by mouth 2 (Two) Times a Day for 5 days., Disp: 10 capsule, Rfl: 0  •  phenazopyridine (Pyridium) 200 MG tablet, Take 1 tablet by mouth 3 (Three) Times a Day As Needed for Bladder Spasms., Disp: 10 tablet, Rfl: 0      OBJECTIVE:  /76 (BP  "Location: Right arm, Patient Position: Sitting, Cuff Size: Adult)   Pulse 81   Temp 98.6 °F (37 °C) (Infrared)   Resp 16   Ht 160 cm (62.99\")   Wt 59 kg (130 lb)   SpO2 100%   BMI 23.04 kg/m²    Physical Exam  Vitals reviewed.   Constitutional:       Appearance: She is well-developed.   HENT:      Head: Normocephalic and atraumatic.   Eyes:      Pupils: Pupils are equal, round, and reactive to light.   Cardiovascular:      Rate and Rhythm: Normal rate and regular rhythm.      Heart sounds: Normal heart sounds.   Pulmonary:      Effort: Pulmonary effort is normal.      Breath sounds: Normal breath sounds.   Abdominal:      General: Bowel sounds are normal.      Palpations: Abdomen is soft.   Musculoskeletal:         General: Normal range of motion.      Cervical back: Normal range of motion and neck supple.      Comments: Bilateral flank pain   Skin:     General: Skin is warm and dry.   Neurological:      Mental Status: She is alert and oriented to person, place, and time.         Assessment/Plan    Diagnoses and all orders for this visit:    1. Flank pain (Primary)  -     Urinalysis With Culture If Indicated - Urine, Clean Catch; Future    2. Dysuria  -     Urinalysis With Culture If Indicated - Urine, Clean Catch; Future  -     nitrofurantoin, macrocrystal-monohydrate, (Macrobid) 100 MG capsule; Take 1 capsule by mouth 2 (Two) Times a Day for 5 days.  Dispense: 10 capsule; Refill: 0  -     phenazopyridine (Pyridium) 200 MG tablet; Take 1 tablet by mouth 3 (Three) Times a Day As Needed for Bladder Spasms.  Dispense: 10 tablet; Refill: 0    Will start treatment based on symptoms with urine culture pending.  Discussed possible causes of recurrent UTI in females.         An After Visit Summary was printed and given to the patient at discharge.  No follow-ups on file. Sooner if problems arise.          Mayelinnora Harman APRN. 6/14/2021   Electronically Signed  "

## 2021-06-15 ENCOUNTER — TELEPHONE (OUTPATIENT)
Dept: INTERNAL MEDICINE | Facility: CLINIC | Age: 36
End: 2021-06-15

## 2021-06-15 LAB
BILIRUB UR QL STRIP: NEGATIVE
CLARITY UR: CLEAR
COLOR UR: YELLOW
GLUCOSE UR STRIP-MCNC: NEGATIVE MG/DL
HGB UR QL STRIP.AUTO: NEGATIVE
KETONES UR QL STRIP: NEGATIVE
LEUKOCYTE ESTERASE UR QL STRIP.AUTO: NEGATIVE
NITRITE UR QL STRIP: NEGATIVE
PH UR STRIP.AUTO: 6 [PH] (ref 5–8)
PROT UR QL STRIP: NEGATIVE
SP GR UR STRIP: 1.01 (ref 1–1.03)
UROBILINOGEN UR QL STRIP: NORMAL

## 2021-06-15 NOTE — TELEPHONE ENCOUNTER
Patient informed of lab results and to complete the full course of the antibiotics, per Mayelin.  Patient stated she has already scheduled the ultrasound ordered by Dr. Ybarra.

## 2021-06-15 NOTE — TELEPHONE ENCOUNTER
----- Message from ISHAN Amador sent at 6/15/2021  9:41 AM CDT -----  Please call patient and let her know that her urine is not indicating an infection. She was started on antibiotics for symptoms which I recommend she complete. She has a pelvic ultrasound ordered by Dr. Ybarra that needs to be completed.

## 2021-06-16 ENCOUNTER — APPOINTMENT (OUTPATIENT)
Dept: CT IMAGING | Facility: HOSPITAL | Age: 36
End: 2021-06-16

## 2021-06-16 ENCOUNTER — APPOINTMENT (OUTPATIENT)
Dept: ULTRASOUND IMAGING | Facility: HOSPITAL | Age: 36
End: 2021-06-16

## 2021-06-16 ENCOUNTER — HOSPITAL ENCOUNTER (EMERGENCY)
Facility: HOSPITAL | Age: 36
Discharge: HOME | End: 2021-06-16
Admitting: EMERGENCY MEDICINE

## 2021-06-16 VITALS
TEMPERATURE: 98.3 F | BODY MASS INDEX: 23.92 KG/M2 | WEIGHT: 130 LBS | RESPIRATION RATE: 12 BRPM | SYSTOLIC BLOOD PRESSURE: 108 MMHG | HEART RATE: 69 BPM | HEIGHT: 62 IN | OXYGEN SATURATION: 100 % | DIASTOLIC BLOOD PRESSURE: 97 MMHG

## 2021-06-16 DIAGNOSIS — R10.9 FLANK PAIN: Primary | ICD-10-CM

## 2021-06-16 LAB
ALBUMIN SERPL-MCNC: 4.5 G/DL (ref 3.5–5.2)
ALBUMIN/GLOB SERPL: 1.5 G/DL
ALP SERPL-CCNC: 55 U/L (ref 39–117)
ALT SERPL W P-5'-P-CCNC: 14 U/L (ref 1–33)
ANION GAP SERPL CALCULATED.3IONS-SCNC: 9 MMOL/L (ref 5–15)
AST SERPL-CCNC: 25 U/L (ref 1–32)
B-HCG UR QL: NEGATIVE
BASOPHILS # BLD AUTO: 0.05 10*3/MM3 (ref 0–0.2)
BASOPHILS NFR BLD AUTO: 0.7 % (ref 0–1.5)
BILIRUB SERPL-MCNC: 0.6 MG/DL (ref 0–1.2)
BILIRUB UR QL STRIP: NEGATIVE
BUN SERPL-MCNC: 15 MG/DL (ref 6–20)
BUN/CREAT SERPL: 25 (ref 7–25)
C TRACH RRNA CVX QL NAA+PROBE: NOT DETECTED
CALCIUM SPEC-SCNC: 9.7 MG/DL (ref 8.6–10.5)
CHLORIDE SERPL-SCNC: 104 MMOL/L (ref 98–107)
CLARITY UR: CLEAR
CLUE CELLS SPEC QL WET PREP: ABNORMAL
CO2 SERPL-SCNC: 27 MMOL/L (ref 22–29)
COLOR UR: YELLOW
CREAT SERPL-MCNC: 0.6 MG/DL (ref 0.57–1)
DEPRECATED RDW RBC AUTO: 41.1 FL (ref 37–54)
EOSINOPHIL # BLD AUTO: 0.08 10*3/MM3 (ref 0–0.4)
EOSINOPHIL NFR BLD AUTO: 1.2 % (ref 0.3–6.2)
ERYTHROCYTE [DISTWIDTH] IN BLOOD BY AUTOMATED COUNT: 12.6 % (ref 12.3–15.4)
GFR SERPL CREATININE-BSD FRML MDRD: 114 ML/MIN/1.73
GFR SERPL CREATININE-BSD FRML MDRD: 139 ML/MIN/1.73
GLOBULIN UR ELPH-MCNC: 3 GM/DL
GLUCOSE SERPL-MCNC: 86 MG/DL (ref 65–99)
GLUCOSE UR STRIP-MCNC: NEGATIVE MG/DL
HCT VFR BLD AUTO: 40.5 % (ref 34–46.6)
HGB BLD-MCNC: 13.9 G/DL (ref 12–15.9)
HGB UR QL STRIP.AUTO: NEGATIVE
HYDATID CYST SPEC WET PREP: ABNORMAL
IMM GRANULOCYTES # BLD AUTO: 0.03 10*3/MM3 (ref 0–0.05)
IMM GRANULOCYTES NFR BLD AUTO: 0.4 % (ref 0–0.5)
INTERNAL NEGATIVE CONTROL: NORMAL
INTERNAL POSITIVE CONTROL: NORMAL
KETONES UR QL STRIP: NEGATIVE
LEUKOCYTE ESTERASE UR QL STRIP.AUTO: NEGATIVE
LYMPHOCYTES # BLD AUTO: 1.73 10*3/MM3 (ref 0.7–3.1)
LYMPHOCYTES NFR BLD AUTO: 25.2 % (ref 19.6–45.3)
Lab: NORMAL
MCH RBC QN AUTO: 30.6 PG (ref 26.6–33)
MCHC RBC AUTO-ENTMCNC: 34.3 G/DL (ref 31.5–35.7)
MCV RBC AUTO: 89.2 FL (ref 79–97)
MONOCYTES # BLD AUTO: 0.55 10*3/MM3 (ref 0.1–0.9)
MONOCYTES NFR BLD AUTO: 8 % (ref 5–12)
N GONORRHOEA RRNA SPEC QL NAA+PROBE: NOT DETECTED
NEUTROPHILS NFR BLD AUTO: 4.42 10*3/MM3 (ref 1.7–7)
NEUTROPHILS NFR BLD AUTO: 64.5 % (ref 42.7–76)
NITRITE UR QL STRIP: NEGATIVE
NRBC BLD AUTO-RTO: 0 /100 WBC (ref 0–0.2)
PH UR STRIP.AUTO: <=5 [PH] (ref 5–8)
PLATELET # BLD AUTO: 248 10*3/MM3 (ref 140–450)
PMV BLD AUTO: 10 FL (ref 6–12)
POTASSIUM SERPL-SCNC: 4.1 MMOL/L (ref 3.5–5.2)
PROT SERPL-MCNC: 7.5 G/DL (ref 6–8.5)
PROT UR QL STRIP: NEGATIVE
RBC # BLD AUTO: 4.54 10*6/MM3 (ref 3.77–5.28)
SODIUM SERPL-SCNC: 140 MMOL/L (ref 136–145)
SP GR UR STRIP: 1.01 (ref 1–1.03)
T VAGINALIS SPEC QL WET PREP: ABNORMAL
UROBILINOGEN UR QL STRIP: NORMAL
WBC # BLD AUTO: 6.86 10*3/MM3 (ref 3.4–10.8)
WBC SPEC QL WET PREP: ABNORMAL
YEAST GENITAL QL WET PREP: ABNORMAL

## 2021-06-16 PROCEDURE — 96374 THER/PROPH/DIAG INJ IV PUSH: CPT

## 2021-06-16 PROCEDURE — 87210 SMEAR WET MOUNT SALINE/INK: CPT | Performed by: NURSE PRACTITIONER

## 2021-06-16 PROCEDURE — 25010000002 HYDROMORPHONE PER 4 MG: Performed by: NURSE PRACTITIONER

## 2021-06-16 PROCEDURE — 96375 TX/PRO/DX INJ NEW DRUG ADDON: CPT

## 2021-06-16 PROCEDURE — 99283 EMERGENCY DEPT VISIT LOW MDM: CPT

## 2021-06-16 PROCEDURE — 85025 COMPLETE CBC W/AUTO DIFF WBC: CPT | Performed by: NURSE PRACTITIONER

## 2021-06-16 PROCEDURE — 87591 N.GONORRHOEAE DNA AMP PROB: CPT | Performed by: NURSE PRACTITIONER

## 2021-06-16 PROCEDURE — 25010000002 KETOROLAC TROMETHAMINE PER 15 MG: Performed by: NURSE PRACTITIONER

## 2021-06-16 PROCEDURE — 81025 URINE PREGNANCY TEST: CPT | Performed by: NURSE PRACTITIONER

## 2021-06-16 PROCEDURE — 74176 CT ABD & PELVIS W/O CONTRAST: CPT

## 2021-06-16 PROCEDURE — 76856 US EXAM PELVIC COMPLETE: CPT

## 2021-06-16 PROCEDURE — 81003 URINALYSIS AUTO W/O SCOPE: CPT | Performed by: NURSE PRACTITIONER

## 2021-06-16 PROCEDURE — 80053 COMPREHEN METABOLIC PANEL: CPT | Performed by: NURSE PRACTITIONER

## 2021-06-16 PROCEDURE — 87491 CHLMYD TRACH DNA AMP PROBE: CPT | Performed by: NURSE PRACTITIONER

## 2021-06-16 RX ORDER — HYDROMORPHONE HYDROCHLORIDE 1 MG/ML
0.5 INJECTION, SOLUTION INTRAMUSCULAR; INTRAVENOUS; SUBCUTANEOUS ONCE
Status: COMPLETED | OUTPATIENT
Start: 2021-06-16 | End: 2021-06-16

## 2021-06-16 RX ORDER — SODIUM CHLORIDE 0.9 % (FLUSH) 0.9 %
10 SYRINGE (ML) INJECTION AS NEEDED
Status: DISCONTINUED | OUTPATIENT
Start: 2021-06-16 | End: 2021-06-16 | Stop reason: HOSPADM

## 2021-06-16 RX ORDER — KETOROLAC TROMETHAMINE 10 MG/1
10 TABLET, FILM COATED ORAL EVERY 6 HOURS PRN
Qty: 12 TABLET | Refills: 0 | Status: SHIPPED | OUTPATIENT
Start: 2021-06-16 | End: 2021-06-19

## 2021-06-16 RX ORDER — KETOROLAC TROMETHAMINE 15 MG/ML
15 INJECTION, SOLUTION INTRAMUSCULAR; INTRAVENOUS ONCE
Status: COMPLETED | OUTPATIENT
Start: 2021-06-16 | End: 2021-06-16

## 2021-06-16 RX ADMIN — KETOROLAC TROMETHAMINE 15 MG: 15 INJECTION, SOLUTION INTRAMUSCULAR; INTRAVENOUS at 08:54

## 2021-06-16 RX ADMIN — HYDROMORPHONE HYDROCHLORIDE 0.5 MG: 1 INJECTION, SOLUTION INTRAMUSCULAR; INTRAVENOUS; SUBCUTANEOUS at 10:11

## 2021-06-16 RX ADMIN — SODIUM CHLORIDE, POTASSIUM CHLORIDE, SODIUM LACTATE AND CALCIUM CHLORIDE 500 ML: 600; 310; 30; 20 INJECTION, SOLUTION INTRAVENOUS at 08:54

## 2021-06-16 NOTE — ED PROVIDER NOTES
Subjective   Patient is a very pleasant 35-year-old female that presents to the ER today with complaint of left flank pain.  Patient states that the pain has been occurring for the past 1 week.  The patient reports that she has no nausea vomiting with this.  She denies abdominal pain with this.  She reports that she has had chills but denies any known fever.  The patient states that she saw her primary care provider several days ago and was started on Macrobid for urinary tract infection.  She states that she was called and told that she did not have a UTI however and is still continuing to have pain.  She denies any vaginal bleeding or discharge.  She presents here today for further evaluation.      History provided by:  Patient   used: No    Flank Pain  Pain location:  L flank  Pain quality: aching and cramping    Pain radiates to:  Does not radiate  Pain severity:  Moderate  Onset quality:  Sudden  Duration:  1 week  Timing:  Constant  Progression:  Unchanged  Chronicity:  New  Context: not alcohol use, not awakening from sleep, not diet changes, not eating, not laxative use, not medication withdrawal, not previous surgeries, not recent illness, not recent sexual activity, not recent travel, not retching, not sick contacts, not suspicious food intake and not trauma    Relieved by:  Nothing  Worsened by:  Nothing  Ineffective treatments:  None tried  Associated symptoms: no anorexia, no belching, no chest pain, no chills, no constipation, no cough, no diarrhea, no dysuria, no fatigue, no fever, no flatus, no hematemesis, no hematochezia, no hematuria, no melena, no nausea, no shortness of breath, no sore throat, no vaginal bleeding, no vaginal discharge and no vomiting    Risk factors: no alcohol abuse, no aspirin use, not elderly, has not had multiple surgeries, no NSAID use, not obese, not pregnant and no recent hospitalization        Review of Systems   Constitutional: Negative for chills,  fatigue and fever.   HENT: Negative for sore throat.    Respiratory: Negative for cough and shortness of breath.    Cardiovascular: Negative for chest pain.   Gastrointestinal: Negative for anorexia, constipation, diarrhea, flatus, hematemesis, hematochezia, melena, nausea and vomiting.   Genitourinary: Positive for flank pain. Negative for dysuria, hematuria, vaginal bleeding and vaginal discharge.   All other systems reviewed and are negative.      Past Medical History:   Diagnosis Date   • Hemangioma of lip 2017    Right Upper Lip   • PONV (postoperative nausea and vomiting)     EXTREME       No Known Allergies    Past Surgical History:   Procedure Laterality Date   • BREAST SURGERY Bilateral     IMPLANTS    •  SECTION     • HEAD/NECK LESION/CYST EXCISION Bilateral 2017    Procedure: Excision of neoplasm of uncertain behavior of the upper lip with linear closure (54380);  Surgeon: Chris Ham MD;  Location: [facility];  Service:    • LIP REPAIR      OVER LAST 26 YEARS HAS HAD 4 OTHER SURGERIES ON LIP   • OTHER SURGICAL HISTORY      Lip       Family History   Problem Relation Age of Onset   • No Known Problems Mother    • No Known Problems Father        Social History     Socioeconomic History   • Marital status:      Spouse name: Not on file   • Number of children: Not on file   • Years of education: Not on file   • Highest education level: Not on file   Tobacco Use   • Smoking status: Never Smoker   • Smokeless tobacco: Never Used   Substance and Sexual Activity   • Alcohol use: No   • Drug use: No   • Sexual activity: Yes     Partners: Male     Birth control/protection: None           Objective   Physical Exam  Vitals and nursing note reviewed. Exam conducted with a chaperone present (Arvind Grant RN).   Constitutional:       Appearance: Normal appearance.   HENT:      Head: Normocephalic and atraumatic.   Eyes:      Conjunctiva/sclera: Conjunctivae normal.   Cardiovascular:       Rate and Rhythm: Normal rate and regular rhythm.   Pulmonary:      Effort: Pulmonary effort is normal.      Breath sounds: Normal breath sounds.   Abdominal:      General: Bowel sounds are normal.      Palpations: Abdomen is soft.      Tenderness: There is abdominal tenderness. There is left CVA tenderness.   Genitourinary:     Comments: Small amt of thick, white discharge noted, no evidence of CMT, no bleeding noted  Skin:     General: Skin is warm and dry.      Capillary Refill: Capillary refill takes less than 2 seconds.   Neurological:      General: No focal deficit present.      Mental Status: She is alert.   Psychiatric:         Mood and Affect: Mood normal.         Procedures           ED Course  ED Course as of Jun 16 1207   Wed Jun 16, 2021   1204 The patient's pelvic ultrasound showed no acute findings.  At this time the patient be discharged home in stable condition.  She is advised to follow-up the primary care provider in 1 to 2 days for recheck.  She has some thickening of the bladder that was noted on the CT scan I advised the patient of this and the need to follow-up for this.  She will need urology consult.  At this time she will be discharged home in stable condition advised return the ER for any new or worsening symptoms.  I will give her a prescription for Toradol for pain.  She advised return ER for any new or worsening symptoms.    [LF]      ED Course User Index  [LF] Manda Narvaez, ISHAN                                   US Pelvis Complete   Final Result   1. The uterus is unremarkable.   2. Ovaries are normal in size. No torsion identified. There are a few   tiny ovarian follicles.   3. No free fluid in the pelvis.       This report was finalized on 06/16/2021 11:45 by Dr Harman Waller, .      CT Abdomen Pelvis Without Contrast   Final Result   1. Circumferential bladder wall thickening may reflect cystitis. Upper   urinary tracts are unremarkable on this noncontrast exam. No  perinephric   stranding or urolithiasis.   2. Bowel is unremarkable.       This report was finalized on 06/16/2021 09:31 by Dr Harman Waller, .        Labs Reviewed   WET PREP, GENITAL - Abnormal; Notable for the following components:       Result Value    WBC'S 3+ WBC's seen (*)     All other components within normal limits   URINALYSIS W/ CULTURE IF INDICATED - Normal    Narrative:     Urine microscopic not indicated.   CBC WITH AUTO DIFFERENTIAL - Normal   POCT PEFORM URINE PREGNANCY - Normal   CHLAMYDIA TRACHOMATIS, NEISSERIA GONORRHOEAE, PCR   COMPREHENSIVE METABOLIC PANEL    Narrative:     GFR Normal >60  Chronic Kidney Disease <60  Kidney Failure <15     CBC AND DIFFERENTIAL    Narrative:     The following orders were created for panel order CBC & Differential.  Procedure                               Abnormality         Status                     ---------                               -----------         ------                     CBC Auto Differential[[id number]]        Normal              Final result                 Please view results for these tests on the individual orders.             MDM  Number of Diagnoses or Management Options  Flank pain: new and requires workup     Amount and/or Complexity of Data Reviewed  Clinical lab tests: ordered and reviewed  Tests in the radiology section of CPT®: ordered and reviewed  Discuss the patient with other providers: yes    Patient Progress  Patient progress: stable      Final diagnoses:   Flank pain       ED Disposition  ED Disposition     ED Disposition Condition Comment    Discharge Stable           THEO Ybarra MD  [address] [id number] [phone]    Call in 1 day           Medication List      New Prescriptions    ketorolac 10 MG tablet  Commonly known as: TORADOL  Take 1 tablet by mouth Every 6 (Six) Hours As Needed for Moderate Pain  for up to 3 days.           Where to Get Your Medications      These medications were sent  to [id number] - [place], [place] - [address]  AT [address] - [phone]  - [phone]  [address] [facility], [facility] [address]    Phone: [phone]   · ketorolac 10 MG tablet          Manda Narvaez, APRN  06/16/21 [id number]

## 2021-06-21 ENCOUNTER — OFFICE VISIT (OUTPATIENT)
Dept: INTERNAL MEDICINE | Facility: CLINIC | Age: 36
End: 2021-06-21

## 2021-06-21 VITALS
OXYGEN SATURATION: 100 % | SYSTOLIC BLOOD PRESSURE: 114 MMHG | WEIGHT: 134 LBS | HEIGHT: 63 IN | RESPIRATION RATE: 15 BRPM | DIASTOLIC BLOOD PRESSURE: 60 MMHG | TEMPERATURE: 98.1 F | HEART RATE: 62 BPM | BODY MASS INDEX: 23.74 KG/M2

## 2021-06-21 DIAGNOSIS — K59.04 CHRONIC IDIOPATHIC CONSTIPATION: Primary | ICD-10-CM

## 2021-06-21 PROCEDURE — 99213 OFFICE O/P EST LOW 20 MIN: CPT | Performed by: INTERNAL MEDICINE

## 2021-06-21 NOTE — PROGRESS NOTES
Chief Complaint   Patient presents with   • Follow-up   • Flank Pain         History:  Luh Tapia is a 35 y.o. female who presents today for evaluation of the above problems.    4-week follow-up.  linzess caused stomach pain and diarrhea  She was seen on June 16 for left flank pain.  Work-up unremarkable.  She ended up going to the hospital and work-up there was negative including pelvic ultrasound, urinalysis.  Symptoms have completely resolved.  The day after she received morphine she was able to work out with weights and go to the gym.    She is interested in trying a lower dose of Linzess.    She has been fighting chronic idiopathic constipation since the age of 17    HPI    ROS:  Review of Systems      Current Outpatient Medications:   •  multivitamin (Daily Vitamins) tablet tablet, Take 1 tablet by mouth Daily., Disp: , Rfl:   •  ondansetron (Zofran) 4 MG tablet, Take 1 tablet by mouth Every 8 (Eight) Hours As Needed for Nausea or Vomiting., Disp: 30 tablet, Rfl: 2  •  phenazopyridine (Pyridium) 200 MG tablet, Take 1 tablet by mouth 3 (Three) Times a Day As Needed for Bladder Spasms., Disp: 10 tablet, Rfl: 0  •  Probiotic Product (PROBIOTIC ADVANCED PO), Take 1 tablet by mouth Daily., Disp: , Rfl:   •  linaclotide (Linzess) 72 MCG capsule capsule, Take 1 capsule by mouth Every Morning Before Breakfast., Disp: 30 capsule, Rfl: 5    Lab Results   Component Value Date    GLUCOSE 86 06/16/2021    BUN 15 06/16/2021    CREATININE 0.60 06/16/2021    EGFRIFNONA 114 06/16/2021    EGFRIFAFRI 139 06/16/2021    BCR 25.0 06/16/2021    K 4.1 06/16/2021    CO2 27.0 06/16/2021    CALCIUM 9.7 06/16/2021    ALBUMIN 4.50 06/16/2021    AST 25 06/16/2021    ALT 14 06/16/2021       WBC   Date Value Ref Range Status   06/16/2021 6.86 3.40 - 10.80 10*3/mm3 Final   05/11/2021 10.2 4.8 - 10.8 K/uL Final     RBC   Date Value Ref Range Status   06/16/2021 4.54 3.77 - 5.28 10*6/mm3 Final   05/11/2021 4.55 4.20 - 5.40 M/uL Final      Hemoglobin   Date Value Ref Range Status   06/16/2021 13.9 12.0 - 15.9 g/dL Final   05/11/2021 14.0 12.0 - 16.0 g/dL Final     Hematocrit   Date Value Ref Range Status   06/16/2021 40.5 34.0 - 46.6 % Final   05/11/2021 41.7 37.0 - 47.0 % Final     MCV   Date Value Ref Range Status   06/16/2021 89.2 79.0 - 97.0 fL Final   05/11/2021 91.6 81.0 - 99.0 fL Final     MCH   Date Value Ref Range Status   06/16/2021 30.6 26.6 - 33.0 pg Final   05/11/2021 30.8 27.0 - 31.0 pg Final     MCHC   Date Value Ref Range Status   06/16/2021 34.3 31.5 - 35.7 g/dL Final   05/11/2021 33.6 33.0 - 37.0 g/dL Final     RDW   Date Value Ref Range Status   06/16/2021 12.6 12.3 - 15.4 % Final   05/11/2021 12.7 11.5 - 14.5 % Final     RDW-SD   Date Value Ref Range Status   06/16/2021 41.1 37.0 - 54.0 fl Final     MPV   Date Value Ref Range Status   06/16/2021 10.0 6.0 - 12.0 fL Final   05/11/2021 9.8 9.4 - 12.3 fL Final     Platelets   Date Value Ref Range Status   06/16/2021 248 140 - 450 10*3/mm3 Final   05/11/2021 256 130 - 400 K/uL Final     Neutrophil Rel %   Date Value Ref Range Status   05/11/2021 64.4 50.0 - 65.0 % Final     Neutrophil %   Date Value Ref Range Status   06/16/2021 64.5 42.7 - 76.0 % Final     Lymphocyte Rel %   Date Value Ref Range Status   05/11/2021 23.9 20.0 - 40.0 % Final     Lymphocyte %   Date Value Ref Range Status   06/16/2021 25.2 19.6 - 45.3 % Final     Monocyte Rel %   Date Value Ref Range Status   05/11/2021 9.4 0.0 - 10.0 % Final     Monocyte %   Date Value Ref Range Status   06/16/2021 8.0 5.0 - 12.0 % Final     Eosinophil Rel %   Date Value Ref Range Status   05/11/2021 1.7 0.0 - 5.0 % Final     Eosinophil %   Date Value Ref Range Status   06/16/2021 1.2 0.3 - 6.2 % Final     Basophil Rel %   Date Value Ref Range Status   05/11/2021 0.4 0.0 - 1.0 % Final     Basophil %   Date Value Ref Range Status   06/16/2021 0.7 0.0 - 1.5 % Final     Immature Grans %   Date Value Ref Range Status   06/16/2021 0.4 0.0  "- 0.5 % Final     Neutrophils Absolute   Date Value Ref Range Status   05/11/2021 6.6 1.5 - 7.5 K/uL Final     Neutrophils, Absolute   Date Value Ref Range Status   06/16/2021 4.42 1.70 - 7.00 10*3/mm3 Final     Lymphocytes Absolute   Date Value Ref Range Status   05/11/2021 2.4 1.1 - 4.5 K/uL Final     Lymphocytes, Absolute   Date Value Ref Range Status   06/16/2021 1.73 0.70 - 3.10 10*3/mm3 Final     Monocytes Absolute   Date Value Ref Range Status   05/11/2021 1.00 (H) 0.00 - 0.90 K/uL Final     Monocytes, Absolute   Date Value Ref Range Status   06/16/2021 0.55 0.10 - 0.90 10*3/mm3 Final     Eosinophils Absolute   Date Value Ref Range Status   05/11/2021 0.20 0.00 - 0.60 K/uL Final     Eosinophils, Absolute   Date Value Ref Range Status   06/16/2021 0.08 0.00 - 0.40 10*3/mm3 Final     Basophils Absolute   Date Value Ref Range Status   05/11/2021 0.00 0.00 - 0.20 K/uL Final     Basophils, Absolute   Date Value Ref Range Status   06/16/2021 0.05 0.00 - 0.20 10*3/mm3 Final     Immature Grans, Absolute   Date Value Ref Range Status   06/16/2021 0.03 0.00 - 0.05 10*3/mm3 Final   05/11/2021 0.0 K/uL Final     nRBC   Date Value Ref Range Status   06/16/2021 0.0 0.0 - 0.2 /100 WBC Final         OBJECTIVE:  Visit Vitals  /60 (BP Location: Left arm, Patient Position: Sitting, Cuff Size: Adult)   Pulse 62   Temp 98.1 °F (36.7 °C) (Oral)   Resp 15   Ht 160 cm (62.99\")   Wt 60.8 kg (134 lb)   LMP 05/29/2021 (Exact Date)   SpO2 100%   BMI 23.74 kg/m²      Physical Exam  Constitutional:       General: She is not in acute distress.     Appearance: She is well-developed. She is not diaphoretic.   HENT:      Head: Normocephalic and atraumatic.   Eyes:      Pupils: Pupils are equal, round, and reactive to light.   Neck:      Thyroid: No thyromegaly.      Trachea: Phonation normal.   Pulmonary:      Effort: No respiratory distress.   Abdominal:      General: Abdomen is flat.      Palpations: Abdomen is soft.      Tenderness: " There is no abdominal tenderness. There is no right CVA tenderness, left CVA tenderness or guarding.   Skin:     Coloration: Skin is not pale.      Findings: No erythema.   Neurological:      Mental Status: She is alert.   Psychiatric:         Behavior: Behavior normal.         Thought Content: Thought content normal.         Judgment: Judgment normal.         Assessment/Plan    Diagnoses and all orders for this visit:    1. Chronic idiopathic constipation (Primary)  -     linaclotide (Linzess) 72 MCG capsule capsule; Take 1 capsule by mouth Every Morning Before Breakfast.  Dispense: 30 capsule; Refill: 5       She is getting her liver ultrasound tomorrow.  We will try low-dose of Linzess to see if this provides benefit without the side effects.    Follow-up 6 months for annual physical with Angi or sooner if indicated    Return in about 6 months (around 12/21/2021) for Annual physical with Angi.    Patient was given instructions and counseling regarding his/her condition or for health maintenance advice. Please see specific information pulled into the AVS if appropriate.     LAURA Ybarra MD   15:22 CDT  6/21/2021

## 2021-06-22 ENCOUNTER — HOSPITAL ENCOUNTER (OUTPATIENT)
Dept: ULTRASOUND IMAGING | Facility: HOSPITAL | Age: 36
Discharge: HOME | End: 2021-06-22
Admitting: INTERNAL MEDICINE

## 2021-06-22 DIAGNOSIS — R10.31 RLQ ABDOMINAL PAIN: ICD-10-CM

## 2021-06-22 DIAGNOSIS — R10.11 RUQ PAIN: ICD-10-CM

## 2021-06-22 PROCEDURE — 76700 US EXAM ABDOM COMPLETE: CPT

## 2021-07-27 ENCOUNTER — TRANSCRIBE ORDERS (OUTPATIENT)
Dept: LAB | Facility: HOSPITAL | Age: 36
End: 2021-07-27

## 2021-07-27 DIAGNOSIS — Z01.818 PREOPERATIVE TESTING: Primary | ICD-10-CM

## 2021-08-09 ENCOUNTER — OFFICE VISIT (OUTPATIENT)
Dept: OBGYN CLINIC | Age: 36
End: 2021-08-09

## 2021-08-09 VITALS
SYSTOLIC BLOOD PRESSURE: 110 MMHG | WEIGHT: 127 LBS | HEIGHT: 62 IN | DIASTOLIC BLOOD PRESSURE: 72 MMHG | BODY MASS INDEX: 23.37 KG/M2

## 2021-08-09 DIAGNOSIS — N64.4 BREAST PAIN, RIGHT: Primary | ICD-10-CM

## 2021-08-09 DIAGNOSIS — N64.4 BREAST PAIN, LEFT: ICD-10-CM

## 2021-08-09 PROCEDURE — 99213 OFFICE O/P EST LOW 20 MIN: CPT | Performed by: OBSTETRICS & GYNECOLOGY

## 2021-08-09 ASSESSMENT — ENCOUNTER SYMPTOMS
GASTROINTESTINAL NEGATIVE: 1
RESPIRATORY NEGATIVE: 1
EYES NEGATIVE: 1

## 2021-08-09 NOTE — PROGRESS NOTES
Pt states she is having pain in her right breast. She thinks it may be from still having some milk. She quit breastfeeding about 5 months ago.

## 2021-08-09 NOTE — PROGRESS NOTES
Tanvir Turner is a 30 y. o.  who presents today for complaints of right breast pain, and occasional left breast pain. Pt has pain in right breast, for the past 5 months, states she stopped breast feeding 5 months ago. Pt states she feels like a 'blocked duct\". Pt still leaking milk. Review of Systems   Constitutional: Negative. HENT: Negative. Eyes: Negative. Respiratory: Negative. Cardiovascular: Negative. Gastrointestinal: Negative. Genitourinary: Negative. Negative for dysuria, frequency, menstrual problem, pelvic pain, urgency and vaginal discharge. Skin: Negative. Neurological: Negative. Psychiatric/Behavioral: Negative.         Past Medical History:   Diagnosis Date    Hemorrhoids        Past Surgical History:   Procedure Laterality Date    BREAST SURGERY      implants     SECTION N/A 2020     SECTION performed by Sandra Brumfield [facility] at [facility]    LIP REPAIR         Family History   Problem Relation Age of Onset    Colon Cancer Neg Hx     Colon Polyps Neg Hx     Esophageal Cancer Neg Hx     Liver Cancer Neg Hx     Liver Disease Neg Hx     Rectal Cancer Neg Hx     Stomach Cancer Neg Hx        Social History     Socioeconomic History    Marital status:      Spouse name: Not on file    Number of children: Not on file    Years of education: Not on file    Highest education level: Not on file   Occupational History    Not on file   Tobacco Use    Smoking status: Never Smoker    Smokeless tobacco: Never Used   Vaping Use    Vaping Use: Never used   Substance and Sexual Activity    Alcohol use: No    Drug use: No    Sexual activity: Yes     Partners: Male   Other Topics Concern    Not on file   Social History Narrative    Not on file     Social Determinants of Health     Financial Resource Strain:     Difficulty of Paying Living Expenses:    Food Insecurity:     Worried About Running Out of Food in the Last Year:     Ran Out of Food in the Last Year:    Transportation Needs:     Lack of Transportation (Medical):  Lack of Transportation (Non-Medical):    Physical Activity:     Days of Exercise per Week:     Minutes of Exercise per Session:    Stress:     Feeling of Stress :    Social Connections:     Frequency of Communication with Friends and Family:     Frequency of Social Gatherings with Friends and Family:     Attends Bahai Services:     Active Member of Clubs or Organizations:     Attends Club or Organization Meetings:     Marital Status:    Intimate Partner Violence:     Fear of Current or Ex-Partner:     Emotionally Abused:     Physically Abused:     Sexually Abused:          Current Outpatient Medications:     ondansetron (ZOFRAN ODT) 4 MG disintegrating tablet, Take 1 tablet by mouth every 8 hours as needed for Nausea or Vomiting (Patient not taking: Reported on 8/9/2021), Disp: 10 tablet, Rfl: 0    No Known Allergies    /72   Ht 5' 2\" (1.575 m)   Wt 127 lb (57.6 kg)   LMP 07/20/2021   Breastfeeding No   BMI 23.23 kg/m²   Physical Exam  Constitutional:       General: She is not in acute distress. Appearance: She is well-developed. She is not diaphoretic. HENT:      Head: Normocephalic and atraumatic. Hair is normal.      Right Ear: Hearing and external ear normal. No decreased hearing noted. Left Ear: Hearing and external ear normal. No decreased hearing noted. Nose: Nose normal. No rhinorrhea. Mouth/Throat:      Dentition: Normal dentition. Eyes:      General:         Right eye: No discharge. Left eye: No discharge. Conjunctiva/sclera: Conjunctivae normal.      Pupils: Pupils are equal, round, and reactive to light. Pulmonary:      Effort: Pulmonary effort is normal. No respiratory distress. Chest:      Breasts:         Right: Tenderness present. No swelling, bleeding, inverted nipple, mass, nipple discharge or skin change.          Left: Tenderness

## 2021-08-12 ENCOUNTER — LAB (OUTPATIENT)
Dept: LAB | Facility: HOSPITAL | Age: 36
End: 2021-08-12

## 2021-08-12 DIAGNOSIS — Z01.818 PREOPERATIVE TESTING: ICD-10-CM

## 2021-08-12 LAB — SARS-COV-2 ORF1AB RESP QL NAA+PROBE: NOT DETECTED

## 2021-08-12 PROCEDURE — C9803 HOPD COVID-19 SPEC COLLECT: HCPCS

## 2021-08-12 PROCEDURE — U0004 COV-19 TEST NON-CDC HGH THRU: HCPCS

## 2021-08-13 ENCOUNTER — HOSPITAL ENCOUNTER (OUTPATIENT)
Dept: WOMENS IMAGING | Age: 36
Discharge: HOME | End: 2021-08-13

## 2021-08-13 DIAGNOSIS — N64.4 BREAST PAIN, LEFT: ICD-10-CM

## 2021-08-13 DIAGNOSIS — N64.4 BREAST PAIN, RIGHT: ICD-10-CM

## 2021-08-13 PROCEDURE — 76642 ULTRASOUND BREAST LIMITED: CPT

## 2021-12-07 ENCOUNTER — OFFICE VISIT (OUTPATIENT)
Dept: OBGYN CLINIC | Age: 36
End: 2021-12-07

## 2021-12-07 VITALS
HEART RATE: 74 BPM | SYSTOLIC BLOOD PRESSURE: 108 MMHG | HEIGHT: 62 IN | BODY MASS INDEX: 23.37 KG/M2 | DIASTOLIC BLOOD PRESSURE: 68 MMHG | WEIGHT: 127 LBS

## 2021-12-07 DIAGNOSIS — N89.8 VAGINAL ODOR: ICD-10-CM

## 2021-12-07 DIAGNOSIS — N76.0 BACTERIAL VAGINOSIS: ICD-10-CM

## 2021-12-07 DIAGNOSIS — B96.89 BACTERIAL VAGINOSIS: ICD-10-CM

## 2021-12-07 DIAGNOSIS — N89.8 VAGINAL DISCHARGE: Primary | ICD-10-CM

## 2021-12-07 DIAGNOSIS — N94.10 DYSPAREUNIA IN FEMALE: ICD-10-CM

## 2021-12-07 PROCEDURE — 99213 OFFICE O/P EST LOW 20 MIN: CPT | Performed by: NURSE PRACTITIONER

## 2021-12-07 ASSESSMENT — ENCOUNTER SYMPTOMS
DIARRHEA: 0
RESPIRATORY NEGATIVE: 1
EYES NEGATIVE: 1
CONSTIPATION: 0
ALLERGIC/IMMUNOLOGIC NEGATIVE: 1
GASTROINTESTINAL NEGATIVE: 1

## 2021-12-07 NOTE — PROGRESS NOTES
Pt states she has vaginal discharge, odor, and itching. She states this has been going on since August. She did have a yeast infection and last dose was last Monday.

## 2021-12-07 NOTE — PATIENT INSTRUCTIONS
Patient Education        Bacterial Vaginosis: Care Instructions  Overview     Bacterial vaginosis is a type of vaginal infection. It is caused by excess growth of certain bacteria that are normally found in the vagina. Symptoms can include itching, swelling, pain when you urinate or have sex, and a gray or yellow discharge with a \"fishy\" odor. It is not considered an infection that is spread through sexual contact. Symptoms can be annoying and uncomfortable. But bacterial vaginosis does not usually cause other health problems. However, if you have it while you are pregnant, it can cause complications. While the infection may go away on its own, most doctors use antibiotics to treat it. You may have been prescribed pills or vaginal cream. With treatment, bacterial vaginosis usually clears up in 5 to 7 days. Follow-up care is a key part of your treatment and safety. Be sure to make and go to all appointments, and call your doctor if you are having problems. It's also a good idea to know your test results and keep a list of the medicines you take. How can you care for yourself at home? · Take your antibiotics as directed. Do not stop taking them just because you feel better. You need to take the full course of antibiotics. · Do not eat or drink anything that contains alcohol if you are taking metronidazole or tinidazole. · Keep using your medicine if you start your period. Use pads instead of tampons while using a vaginal cream or suppository. Tampons can absorb the medicine. · Wear loose cotton clothing. Do not wear nylon and other materials that hold body heat and moisture close to the skin. · Do not scratch. Relieve itching with a cold pack or a cool bath. · Do not wash your vaginal area more than once a day. Use plain water or a mild, unscented soap. Do not douche. When should you call for help?   Watch closely for changes in your health, and be sure to contact your doctor if:    · You have unexpected vaginal bleeding.     · You have a fever.     · You have new or increased pain in your vagina or pelvis.     · You are not getting better after 1 week.     · Your symptoms return after you finish the course of your medicine. Where can you learn more? Go to https://chgoodeb.healthRyzing. org and sign in to your [employer] account. Enter A010 in the ScoreStream box to learn more about \"Bacterial Vaginosis: Care Instructions. \"     If you do not have an account, please click on the \"Sign Up Now\" link. Current as of: February 11, 2021               Content Version: 13.0  © 6307-5629 Healthwise, Incorporated. Care instructions adapted under license by [facility] ([facility]). If you have questions about a medical condition or this instruction, always ask your healthcare professional. [address] any warranty or liability for your use of this information.

## 2021-12-07 NOTE — PROGRESS NOTES
Lakshmi Valverde is a 29 y.o. female who presents today for her medical conditions/ complaints as noted below. Lakshmi Valverde is c/o of Vaginal Odor, Vaginal Discharge, and Vaginal Itching        HPI  Pt presents c/o vaginal odor, discharge and pain, also painful sex for several months. Has tried several treatments OTC and recently took Diflucan without any relief. The odor has been better lately, but still having pain and discharge. No new sexual partners, has been  15 years. No new soaps, lotions or detergents. Patient's last menstrual period was 2021 (exact date).     Past Medical History:   Diagnosis Date    Hemorrhoids      Past Surgical History:   Procedure Laterality Date    BREAST SURGERY      implants     SECTION N/A 2020     SECTION performed by Radha Ramires MD at [address] [facility] L&D OR    LIP REPAIR       Family History   Problem Relation Age of Onset    Colon Cancer Neg Hx     Colon Polyps Neg Hx     Esophageal Cancer Neg Hx     Liver Cancer Neg Hx     Liver Disease Neg Hx     Rectal Cancer Neg Hx     Stomach Cancer Neg Hx      Social History     Tobacco Use    Smoking status: Never Smoker    Smokeless tobacco: Never Used   Substance Use Topics    Alcohol use: No       Current Outpatient Medications   Medication Sig Dispense Refill    Clindamycin Phosphate, 1 Dose, 2 % CREA Place 1 suppository vaginally nightly 1 each 0     No current facility-administered medications for this visit. No Known Allergies  Vitals:    21 1118   BP: 108/68   Pulse: 74     Body mass index is 23.23 kg/m². Review of Systems   Constitutional: Negative. HENT: Negative. Eyes: Negative. Respiratory: Negative. Cardiovascular: Negative. Gastrointestinal: Negative. Negative for constipation and diarrhea. Endocrine: Negative. Genitourinary: Positive for dyspareunia, vaginal discharge and vaginal pain.  Negative for frequency, menstrual problem and urgency. Musculoskeletal: Negative. Skin: Negative. Allergic/Immunologic: Negative. Neurological: Negative. Hematological: Negative. Psychiatric/Behavioral: Negative. All other systems reviewed and are negative. Physical Exam  Vitals and nursing note reviewed. Constitutional:       Appearance: She is well-developed. HENT:      Head: Normocephalic. Right Ear: External ear normal.      Left Ear: External ear normal.      Nose: Nose normal.   Genitourinary:     General: Normal vulva. Vagina: Vaginal discharge (thin white) and tenderness present. Cervix: Normal.      Uterus: Normal.       Adnexa:         Right: Tenderness present. No mass. Left: Tenderness present. No mass. Comments: Swab collected  Musculoskeletal:         General: Normal range of motion. Cervical back: Normal range of motion. Skin:     General: Skin is warm and dry. Neurological:      Mental Status: She is alert and oriented to person, place, and time. Psychiatric:         Attention and Perception: Attention normal.         Mood and Affect: Mood normal.         Speech: Speech normal.         Behavior: Behavior normal.         Thought Content: Thought content normal.         Cognition and Memory: Cognition normal.         Judgment: Judgment normal.          Diagnosis Orders   1. Vaginal discharge  WI WET ALISA/ W PREPARATIONS   2. Vaginal odor  WI WET ALISA/ W PREPARATIONS   3. Bacterial vaginosis     4.  Dyspareunia in female         MEDICATIONS:  Orders Placed This Encounter   Medications    Clindamycin Phosphate, 1 Dose, 2 % CREA     Sig: Place 1 suppository vaginally nightly     Dispense:  1 each     Refill:  0       ORDERS:  Orders Placed This Encounter   Procedures    WI WET ALISA/ W PREPARATIONS       PLAN:  +clue cells on wet prep  Tx given  If no improvement of symptoms, may need u/s and propath- pt states understanding    Patient Instructions     Patient bleeding.     · You have a fever.     · You have new or increased pain in your vagina or pelvis.     · You are not getting better after 1 week.     · Your symptoms return after you finish the course of your medicine. Where can you learn more? Go to https://chpeleathaeweb.[email or web address] [email or web address] org and sign in to your [employer] account. Enter B769 in the [employer] box to learn more about \"Bacterial Vaginosis: Care Instructions. \"     If you do not have an account, please click on the \"Sign Up Now\" link. Current as of: February 11, 2021               Content Version: 13.0  © 0716-0850 Healthwise, Incorporated. Care instructions adapted under license by [address] If you have questions about a medical condition or this instruction, always ask your healthcare professional. [address] any warranty or liability for your use of this information.

## 2022-01-10 RX ORDER — METRONIDAZOLE 500 MG/1
500 TABLET ORAL 2 TIMES DAILY
Qty: 14 TABLET | Refills: 0 | Status: SHIPPED | OUTPATIENT
Start: 2022-01-10 | End: 2022-01-17

## 2022-01-20 ENCOUNTER — OFFICE VISIT (OUTPATIENT)
Dept: OBGYN CLINIC | Age: 37
End: 2022-01-20

## 2022-01-20 VITALS
HEART RATE: 76 BPM | SYSTOLIC BLOOD PRESSURE: 118 MMHG | WEIGHT: 135 LBS | HEIGHT: 62 IN | BODY MASS INDEX: 24.84 KG/M2 | DIASTOLIC BLOOD PRESSURE: 77 MMHG

## 2022-01-20 DIAGNOSIS — N76.0 BACTERIAL VAGINOSIS: ICD-10-CM

## 2022-01-20 DIAGNOSIS — B96.89 BACTERIAL VAGINOSIS: ICD-10-CM

## 2022-01-20 DIAGNOSIS — R10.2 PELVIC PAIN: ICD-10-CM

## 2022-01-20 DIAGNOSIS — R23.2 HOT FLASHES: ICD-10-CM

## 2022-01-20 DIAGNOSIS — R30.0 DYSURIA: Primary | ICD-10-CM

## 2022-01-20 LAB
CORTISOL TOTAL: 6.9 UG/DL
ESTRADIOL LEVEL: 251.1 PG/ML
FOLLICLE STIMULATING HORMONE: 2.2 MIU/ML
PROGESTERONE LEVEL: 8.66 NG/ML
TESTOSTERONE TOTAL: 12.4 NG/DL (ref 8.4–48.1)

## 2022-01-20 PROCEDURE — 99214 OFFICE O/P EST MOD 30 MIN: CPT | Performed by: NURSE PRACTITIONER

## 2022-01-20 RX ORDER — FLUCONAZOLE 150 MG/1
150 TABLET ORAL
Qty: 2 TABLET | Refills: 0 | Status: SHIPPED | OUTPATIENT
Start: 2022-01-20 | End: 2022-03-14 | Stop reason: SDUPTHER

## 2022-01-20 RX ORDER — CLINDAMYCIN HYDROCHLORIDE 300 MG/1
300 CAPSULE ORAL 2 TIMES DAILY
Qty: 14 CAPSULE | Refills: 0 | Status: SHIPPED | OUTPATIENT
Start: 2022-01-20 | End: 2022-01-27

## 2022-01-20 ASSESSMENT — ENCOUNTER SYMPTOMS
GASTROINTESTINAL NEGATIVE: 1
ALLERGIC/IMMUNOLOGIC NEGATIVE: 1
RESPIRATORY NEGATIVE: 1
EYES NEGATIVE: 1
DIARRHEA: 0
CONSTIPATION: 0

## 2022-01-20 NOTE — PROGRESS NOTES
Patient presents today with c/o vaginal discharge ( yellow and white) with odor, also has vaginal pain. She states when she urinates she feels like she doesn't finish.

## 2022-01-20 NOTE — PROGRESS NOTES
Singh Angel is a 29 y.o. female who presents today for her medical conditions/ complaints as noted below. Singh Angel is c/o of Vaginal Discharge (and odor)        HPI  Pt presents with several new issues. Was treated for BV last month and symptoms only improved for about 2 weeks. Now having more vaginal discharge with odor. Feels like she is not emptying her bladder all the way. C/o pelvic pain. Also having hot flashes. Unsure if from having Covid last year or if having some hormone issues. Patient's last menstrual period was 2021 (approximate).     Past Medical History:   Diagnosis Date    Hemorrhoids      Past Surgical History:   Procedure Laterality Date    BREAST SURGERY      implants     SECTION N/A 2020     SECTION performed [facility]ters [facility] at [facility]D [facility]    LIP REPAIR       Family History   Problem Relation Age of Onset    Colon Cancer Neg Hx     Colon Polyps Neg Hx     Esophageal Cancer Neg Hx     Liver Cancer Neg Hx     Liver Disease Neg Hx     Rectal Cancer Neg Hx     Stomach Cancer Neg Hx      Social History     Tobacco Use    Smoking status: Never Smoker    Smokeless tobacco: Never Used   Substance Use Topics    Alcohol use: No       Current Outpatient Medications   Medication Sig Dispense Refill    clindamycin (CLEOCIN) 300 MG capsule Take 1 capsule by mouth 2 times daily for 7 days 14 capsule 0    fluconazole (DIFLUCAN) 150 MG tablet Take 1 tablet by mouth every 72 hours for 6 days 2 tablet 0     No current facility-administered medications for this visit. No Known Allergies  Vitals:    22 1004   BP: 118/77   Pulse: 76     Body mass index is 24.69 kg/m². Review of Systems   Constitutional: Negative. HENT: Negative. Eyes: Negative. Respiratory: Negative. Cardiovascular: Negative. Gastrointestinal: Negative. Negative for constipation and diarrhea. Endocrine: Negative.     Genitourinary: Positive for dysuria, pelvic pain and vaginal discharge. Negative for frequency, menstrual problem and urgency. Musculoskeletal: Negative. Skin: Negative. Allergic/Immunologic: Negative. Neurological: Negative. Hematological: Negative. Psychiatric/Behavioral: Negative. All other systems reviewed and are negative. Physical Exam  Vitals and nursing note reviewed. Constitutional:       Appearance: She is well-developed. HENT:      Head: Normocephalic. Right Ear: External ear normal.      Left Ear: External ear normal.      Nose: Nose normal.   Genitourinary:     General: Normal vulva. Vagina: Vaginal discharge (creamy white) and tenderness present. No erythema. Cervix: No cervical motion tenderness. Uterus: Tender. Adnexa:         Right: Tenderness present. No mass. Left: Tenderness present. No mass. Comments: Propath and wet prep collected  Musculoskeletal:         General: Normal range of motion. Cervical back: Normal range of motion. Skin:     General: Skin is warm and dry. Neurological:      Mental Status: She is alert and oriented to person, place, and time. Psychiatric:         Attention and Perception: Attention normal.         Mood and Affect: Mood normal.         Speech: Speech normal.         Behavior: Behavior normal.         Thought Content: Thought content normal.         Cognition and Memory: Cognition normal.         Judgment: Judgment normal.          Diagnosis Orders   1. Dysuria     2. Hot flashes  Follicle Stimulating Hormone    Testosterone    DHEA-Sulfate    Cortisol Total    Estradiol    Progesterone   3. Pelvic pain  US NON OB TRANSVAGINAL    CT WET ALISA/ W PREPARATIONS   4.  Bacterial vaginosis  CT WET ALISA/ W PREPARATIONS       MEDICATIONS:  Orders Placed This Encounter   Medications    clindamycin (CLEOCIN) 300 MG capsule     Sig: Take 1 capsule by mouth 2 times daily for 7 days     Dispense:  14 capsule     Refill: 0    fluconazole (DIFLUCAN) 150 MG tablet     Sig: Take 1 tablet by mouth every 72 hours for 6 days     Dispense:  2 tablet     Refill:  0       ORDERS:  Orders Placed This Encounter   Procedures    US NON OB TRANSVAGINAL    Follicle Stimulating Hormone    Testosterone    DHEA-Sulfate    Cortisol Total    Estradiol    Progesterone    SC WET ALISA/ W PREPARATIONS       PLAN:  +clue cells on wet prep- tx given  Propath pending  UA negative  Labs pending  TVUS ordered  There are no Patient Instructions on file for this visit.

## 2022-01-22 LAB — DHEAS (DHEA SULFATE): 128 UG/DL (ref 45–270)

## 2022-01-27 ENCOUNTER — TELEPHONE (OUTPATIENT)
Dept: OBGYN CLINIC | Age: 37
End: 2022-01-27

## 2022-01-27 NOTE — TELEPHONE ENCOUNTER
Called and inform pt that vaginal swab is back and per [facility] everything was normal except she was low on good bacteria. She should be taking abx, but also let her know when she is finished she needs to start a compounded vaginal probiotic from [facility]. YOLETTESDEDVIN DUNAWAY will  send in the script.  Pt VU

## 2022-03-14 ENCOUNTER — PATIENT MESSAGE (OUTPATIENT)
Dept: OBGYN CLINIC | Age: 37
End: 2022-03-14

## 2022-03-14 RX ORDER — FLUCONAZOLE 150 MG/1
150 TABLET ORAL
Qty: 2 TABLET | Refills: 0 | Status: SHIPPED | OUTPATIENT
Start: 2022-03-14 | End: 2022-03-20

## 2022-03-14 NOTE — TELEPHONE ENCOUNTER
From: Anthony Cole  To: Dr. Kenji Tamayo: 3/14/2022 8:41 AM CDT  Subject: Vaginal infection and Pap smear     Hi Dr. Darinel Hutton,     I am struggling with vaginal infection again and Im in so much pain. I need an appointment with you and I think is about time for my Pap smear too. I really want to see you because Im having this ongoing issue and Im not getting no where.      Thank you,   Aron Davis

## 2022-03-31 ENCOUNTER — OFFICE VISIT (OUTPATIENT)
Dept: OBGYN CLINIC | Age: 37
End: 2022-03-31

## 2022-03-31 VITALS
WEIGHT: 127 LBS | BODY MASS INDEX: 23.37 KG/M2 | SYSTOLIC BLOOD PRESSURE: 104 MMHG | DIASTOLIC BLOOD PRESSURE: 68 MMHG | HEIGHT: 62 IN

## 2022-03-31 DIAGNOSIS — Z12.39 BREAST CANCER SCREENING OTHER THAN MAMMOGRAM: ICD-10-CM

## 2022-03-31 DIAGNOSIS — R10.2 PELVIC PAIN IN FEMALE: ICD-10-CM

## 2022-03-31 DIAGNOSIS — N94.10 DYSPAREUNIA IN FEMALE: ICD-10-CM

## 2022-03-31 DIAGNOSIS — Z12.4 SCREENING FOR CERVICAL CANCER: ICD-10-CM

## 2022-03-31 DIAGNOSIS — R10.2 VAGINAL PAIN: Primary | ICD-10-CM

## 2022-03-31 DIAGNOSIS — N89.8 VAGINAL DISCHARGE: ICD-10-CM

## 2022-03-31 DIAGNOSIS — Z11.51 SCREENING FOR HPV (HUMAN PAPILLOMAVIRUS): ICD-10-CM

## 2022-03-31 DIAGNOSIS — Z01.419 ENCOUNTER FOR ANNUAL ROUTINE GYNECOLOGICAL EXAMINATION: ICD-10-CM

## 2022-03-31 PROCEDURE — 99214 OFFICE O/P EST MOD 30 MIN: CPT | Performed by: OBSTETRICS & GYNECOLOGY

## 2022-03-31 ASSESSMENT — ENCOUNTER SYMPTOMS
RESPIRATORY NEGATIVE: 1
EYES NEGATIVE: 1
GASTROINTESTINAL NEGATIVE: 1

## 2022-03-31 NOTE — PROGRESS NOTES
Demarco Curtis is a 29 y.o.  who presents today for complaints of vaginal discharge and vaginal pain. Pt here for pap smear and breast exam.   Pt states she has odor, then discharge after every period. Pt states her pain is in her lower pelvis and vagina, she states the pain has increased in intensity and cannot have intercourse. Pt has done Boric Acid suppositories in the past with no relief. She takes Ibuprofen with some relief. Pt would like to have another child. Pt started period last . Review of Systems   Constitutional: Negative. HENT: Negative. Eyes: Negative. Respiratory: Negative. Cardiovascular: Negative. Gastrointestinal: Negative. Genitourinary: Negative. Negative for dysuria, frequency, menstrual problem, pelvic pain, urgency and vaginal discharge. Skin: Negative. Neurological: Negative. Psychiatric/Behavioral: Negative.         Past Medical History:   Diagnosis Date    Hemorrhoids        Past Surgical History:   Procedure Laterality Date    BREAST SURGERY      implants     SECTION N/A 2020     SECTION performed by Sind[facility]our [facility] at [facility]    LIP REPAIR         Family History   Problem Relation Age of Onset    Colon Cancer Neg Hx     Colon Polyps Neg Hx     Esophageal Cancer Neg Hx     Liver Cancer Neg Hx     Liver Disease Neg Hx     Rectal Cancer Neg Hx     Stomach Cancer Neg Hx        Social History     Socioeconomic History    Marital status:      Spouse name: Not on file    Number of children: Not on file    Years of education: Not on file    Highest education level: Not on file   Occupational History    Not on file   Tobacco Use    Smoking status: Never Smoker    Smokeless tobacco: Never Used   Vaping Use    Vaping Use: Never used   Substance and Sexual Activity    Alcohol use: No    Drug use: No    Sexual activity: Yes     Partners: Male   Other Topics Concern    Not on file Social History Narrative    Not on file     Social Determinants of Health     Financial Resource Strain:     Difficulty of Paying Living Expenses: Not on file   Food Insecurity:     Worried About Running Out of Food in the Last Year: Not on file    Immanuel of Food in the Last Year: Not on file   Transportation Needs:     Lack of Transportation (Medical): Not on file    Lack of Transportation (Non-Medical): Not on file   Physical Activity:     Days of Exercise per Week: Not on file    Minutes of Exercise per Session: Not on file   Stress:     Feeling of Stress : Not on file   Social Connections:     Frequency of Communication with Friends and Family: Not on file    Frequency of Social Gatherings with Friends and Family: Not on file    Attends Latter day Services: Not on file    Active Member of [address] [place] [address] [employer] or Organizations: Not on file    Attends Club or Organization Meetings: Not on file    Marital Status: Not on file   Intimate Partner Violence:     Fear of Current or Ex-Partner: Not on file    Emotionally Abused: Not on file    Physically Abused: Not on file    Sexually Abused: Not on file   Housing Stability:     Unable to Pay for Housing in the Last Year: Not on file    Number of [place] in the Last Year: Not on file    Unstable Housing in the Last Year: Not on file       No current outpatient medications on file. No Known Allergies    /68   Ht 5' 2\" (1.575 m)   Wt 127 lb (57.6 kg)   LMP 03/28/2022   BMI 23.23 kg/m²   Physical Exam  Constitutional:       General: She is not in acute distress. Appearance: She is well-developed. HENT:      Head: Normocephalic and atraumatic. Eyes:      Conjunctiva/sclera: Conjunctivae normal.      Pupils: Pupils are equal, round, and reactive to light. Neck:      Thyroid: No thyromegaly. Trachea: No tracheal deviation. Cardiovascular:      Rate and Rhythm: Normal rate and regular rhythm.    Pulmonary:      Effort: Pulmonary effort is normal. No respiratory distress. Breath sounds: Normal breath sounds. Chest:   Breasts: Breasts are symmetrical.      Right: No inverted nipple, mass, nipple discharge, skin change or tenderness. Left: No inverted nipple, mass, nipple discharge, skin change or tenderness. Abdominal:      General: Bowel sounds are normal. There is no distension. Palpations: Abdomen is soft. There is no mass. Tenderness: There is no abdominal tenderness. There is no guarding or rebound. Genitourinary:     Labia:         Right: No rash, tenderness or lesion. Left: No rash, tenderness or lesion. Vagina: Normal.      Cervix: No cervical motion tenderness. Adnexa:         Right: No mass, tenderness or fullness. Left: No mass, tenderness or fullness. Rectum: Normal.      Comments: Uterus 6 wk size  Cervical pain  Anterior vagina pain  Musculoskeletal:         General: No tenderness. Normal range of motion. Cervical back: Normal range of motion and neck supple. Lymphadenopathy:      Cervical: No cervical adenopathy. Skin:     General: Skin is warm and dry. Findings: No rash. Neurological:      Mental Status: She is alert and oriented to person, place, and time. Cranial Nerves: No cranial nerve deficit. Psychiatric:         Speech: Speech normal.         Behavior: Behavior normal.         Thought Content: Thought content normal.         Judgment: Judgment normal.               Assessment   Diagnosis Orders   1. Vaginal pain     2. Vaginal discharge     3. Encounter for annual routine gynecological examination     4. Screening for cervical cancer     5. Screening for HPV (human papillomavirus)     6. Breast cancer screening other than mammogram     7. Dyspareunia in female     8. Pelvic pain in female         Plan     1. Pap smear not done due to period  2. Propath sent  3. Pt told to have her pelvic ultrasound done  4.  Discussed possible infection or endometriosis  5. RTC 2 weeks for pap and results. Isabella Wesley, am scribing for and in the presence of Dr. Kitty Hernández. I, Dr. Kitty Hernández, personally performed the services described in this documentation as scribed by Chandler Watkins in my presence, and it is both accurate and complete    Greater than 50% of this 30 minute visit was spent face-to-face reviewing findings, counseling and coordinating care  .

## 2022-03-31 NOTE — PROGRESS NOTES
Pt is here for breast exam and pap smear. Pt is still having vaginal discharge and having vaginal pain. Pt says when she is standing she feels like a \"kinfe feeling in her vagina. \"  Also, pain during intercourse. Pt states the vaginal pain is getting worse. Also, the pain and discharge goes away when she takes her medication and then it comes back and is worse after her period. Last mammogram:  na  Last pap smear:  10/19/2020  Contraception:  na  :  1  Para:  1  AB:  0  Last bone density:  na  Last colonoscopy:   na    GYN:  12 / irreg / 3-7.

## 2022-03-31 NOTE — PATIENT INSTRUCTIONS
Patient Education        Breast Self-Exam: Care Instructions  Your Care Instructions     A breast self-exam is when you check your breasts for lumps or changes. This regular exam helps you learn how your breasts normally look and feel. Mostbreast problems or changes are not because of cancer. Breast self-exam is not a substitute for a mammogram. Having regular breast exams by your doctor and regular mammograms improve your chances of finding anyproblems with your breasts. Some women set a time each month to do a step-by-step breast self-exam. Other women like a less formal system. They might look at their breasts as they brushtheir teeth, or feel their breasts once in a while in the shower. If you notice a change in your breast, tell your doctor. Follow-up care is a key part of your treatment and safety. Be sure to make and go to all appointments, and call your doctor if you are having problems. It's also a good idea to know your test results and keep alist of the medicines you take. How do you do a breast self-exam?   The best time to examine your breasts is usually one week after your menstrual period begins. Your breasts should not be tender then. If you do not have periods, you might do your exam on a day of the month that is easy to remember.  To examine your breasts:  ? Remove all your clothes above the waist and lie down. When you are lying down, your breast tissue spreads evenly over your chest wall, which makes it easier to feel all your breast tissue. ? Use the pads--not the fingertips--of the 3 middle fingers of your left hand to check your right breast. Move your fingers slowly in small coin-sized circles that overlap. ? Use three levels of pressure to feel of all your breast tissue. Use light pressure to feel the tissue close to the skin surface. Use medium pressure to feel a little deeper. Use firm pressure to feel your tissue close to your breastbone and ribs.  Use each pressure level to feel your breast tissue before moving on to the next spot. ? Check your entire breast, moving up and down as if following a strip from the collarbone to the bra line, and from the armpit to the ribs. Repeat until you have covered the entire breast.  ? Repeat this procedure for your left breast, using the pads of the 3 middle fingers of your right hand.  To examine your breasts while in the shower:  ? Place one arm over your head and lightly soap your breast on that side. ? Using the pads of your fingers, gently move your hand over your breast (in the strip pattern described above), feeling carefully for any lumps or changes. ? Repeat for the other breast.   Have your doctor inspect anything you notice to see if you need further testing. Where can you learn more? Go to https://[employer]. org and sign in to your hhgregg account. Enter P148 in the [employer] box to learn more about \"Breast Self-Exam: Care Instructions. \"     If you do not have an account, please click on the \"Sign Up Now\" link. Current as of: September 8, 2021               Content Version: 13.2  © 2006-2022 Healthwise, Incorporated. Care instructions adapted under license by [facility] ([facility]). If you have questions about a medical condition or this instruction, always ask your healthcare professional. [address] any warranty or liability for your use of this information.

## 2022-04-06 ENCOUNTER — HOSPITAL ENCOUNTER (OUTPATIENT)
Dept: ULTRASOUND IMAGING | Age: 37
Discharge: HOME | End: 2022-04-06

## 2022-04-06 DIAGNOSIS — R10.2 PELVIC PAIN: ICD-10-CM

## 2022-04-06 PROCEDURE — 76830 TRANSVAGINAL US NON-OB: CPT

## 2022-05-02 ENCOUNTER — OFFICE VISIT (OUTPATIENT)
Dept: OBGYN CLINIC | Age: 37
End: 2022-05-02

## 2022-05-02 VITALS
SYSTOLIC BLOOD PRESSURE: 102 MMHG | DIASTOLIC BLOOD PRESSURE: 64 MMHG | BODY MASS INDEX: 23.19 KG/M2 | HEIGHT: 62 IN | WEIGHT: 126 LBS

## 2022-05-02 DIAGNOSIS — N89.8 VAGINAL DISCHARGE: ICD-10-CM

## 2022-05-02 DIAGNOSIS — Z11.51 SCREENING FOR HPV (HUMAN PAPILLOMAVIRUS): ICD-10-CM

## 2022-05-02 DIAGNOSIS — N92.6 IRREGULAR PERIODS: ICD-10-CM

## 2022-05-02 DIAGNOSIS — Z71.2 ENCOUNTER TO DISCUSS TEST RESULTS: ICD-10-CM

## 2022-05-02 DIAGNOSIS — Z12.4 SCREENING FOR CERVICAL CANCER: Primary | ICD-10-CM

## 2022-05-02 LAB
BACTERIAL VAGINOSIS: NOT DETECTED
CANDIDA GLABRATA: DETECTED
CANDIDA KRUSEI: NOT DETECTED
CANDIDA SPP: NOT DETECTED
TRICHOMONAS VAGINALIS: NEGATIVE

## 2022-05-02 ASSESSMENT — ENCOUNTER SYMPTOMS
GASTROINTESTINAL NEGATIVE: 1
EYES NEGATIVE: 1
RESPIRATORY NEGATIVE: 1

## 2022-05-02 NOTE — PROGRESS NOTES
Rika Fournier is a 29 y.o.  who presents today for pap smear and to discuss her propath and ultrasound results. Pt states her period is 2 weeks late, states she is not pregnant. Review of Systems   Constitutional: Negative. HENT: Negative. Eyes: Negative. Respiratory: Negative. Cardiovascular: Negative. Gastrointestinal: Negative. Genitourinary: Negative. Negative for dysuria, frequency, menstrual problem, pelvic pain, urgency and vaginal discharge. Skin: Negative. Neurological: Negative. Psychiatric/Behavioral: Negative.         Past Medical History:   Diagnosis Date    Hemorrhoids        Past Surgical History:   Procedure Laterality Date    BREAST SURGERY      implants     SECTION N/A 2020     SECTION performed by [facility] at [facility]    LIP REPAIR         Family History   Problem Relation Age of Onset    Colon Cancer Neg Hx     Colon Polyps Neg Hx     Esophageal Cancer Neg Hx     Liver Cancer Neg Hx     Liver Disease Neg Hx     Rectal Cancer Neg Hx     Stomach Cancer Neg Hx        Social History     Socioeconomic History    Marital status:      Spouse name: Not on file    Number of children: Not on file    Years of education: Not on file    Highest education level: Not on file   Occupational History    Not on file   Tobacco Use    Smoking status: Never Smoker    Smokeless tobacco: Never Used   Vaping Use    Vaping Use: Never used   Substance and Sexual Activity    Alcohol use: No    Drug use: No    Sexual activity: Not Currently     Partners: Male   Other Topics Concern    Not on file   Social History Narrative    Not on file     Social Determinants of Health     Financial Resource Strain:     Difficulty of Paying Living Expenses: Not on file   Food Insecurity:     Worried About Running Out of Food in the Last Year: Not on file    Immanuel of Food in the Last Year: Not on file   Transportation Needs: is soft. There is no mass. Tenderness: There is no abdominal tenderness. There is no guarding or rebound. Genitourinary:     Labia:         Right: No rash, tenderness or lesion. Left: No rash, tenderness or lesion. Vagina: Normal.      Cervix: No cervical motion tenderness, discharge or friability. Adnexa:         Right: No mass, tenderness or fullness. Left: No mass, tenderness or fullness. Rectum: Normal.   Musculoskeletal:         General: No tenderness or deformity. Normal range of motion. Cervical back: Normal range of motion. Skin:     General: Skin is warm and dry. Coloration: Skin is not pale. Findings: No erythema or rash. Neurological:      Mental Status: She is alert and oriented to person, place, and time. Cranial Nerves: No cranial nerve deficit. Psychiatric:         Speech: Speech normal.         Behavior: Behavior normal.         Thought Content: Thought content normal.         Judgment: Judgment normal.               Assessment   Diagnosis Orders   1. Screening for cervical cancer  Human papillomavirus (HPV) DNA probe thin prep high risk    PAP SMEAR   2. Screening for HPV (human papillomavirus)  Human papillomavirus (HPV) DNA probe thin prep high risk    PAP SMEAR   3. Encounter to discuss test results     4. Irregular periods  TSH    Follicle Stimulating Hormone    HCG, Quantitative, Pregnancy   5. Vaginal discharge  Chlamydia, Gonorrhea, Trichomoniasis Swab       Plan     1. Pap smear and HPV  2. Ultrasound results discussed: told pt her ultrasound is normal.   3. Propath results discussed: told she has a microbial imbalance. 4. Treatment options discussed: discussed Diagnostic Lap, Pelvic Floor Therapy  5. Patrizia vag panel sent, will notify of results. Jonas Alexander, am scribing for and in the presence of Dr. Gloria Orona.    I, Dr. Gloria Orona, personally performed the services described in this documentation as scribed by Jack Funes in my presence, and it is both accurate and complete.

## 2022-05-02 NOTE — PATIENT INSTRUCTIONS
Patient Education        Laparoscopy: What to Expect at Home  Your Recovery  After laparoscopic surgery, you are likely to have pain for the next several days. You may have a low fever and feel tired and sick to your stomach. This iscommon. You should feel better after 1 to 2 weeks. This care sheet gives you a general idea about how long it will take for you to recover. But each person recovers at a different pace. Follow the steps belowto get better as quickly as possible. How can you care for yourself at home? Activity     Rest when you feel tired. Getting enough sleep will help you recover.      Try to walk each day. Start by walking a little more than you did the day before. Bit by bit, increase the amount you walk. Walking boosts blood flow and helps prevent pneumonia and constipation.      Avoid strenuous activities, such as bicycle riding, jogging, weight lifting, or aerobic exercise, until your doctor says it is okay.      Avoid lifting anything that would make you strain. This may include a child, heavy grocery bags and milk containers, a heavy briefcase or backpack, cat litter or dog food bags, or a vacuum .      You may also have some shoulder or back pain. This pain is caused by the gas your doctor used to inflate your belly to help see your organs better. The pain usually lasts about 1 or 2 days.      You may drive when you are no longer taking pain medicine and can quickly move your foot from the gas pedal to the brake. You must also be able to sit comfortably for a long period of time, even if you do not plan to go far. You might get caught in traffic.      You may need to take a few days to a few weeks off work. It depends on the type of work you do and how you feel.      You may shower 24 to 48 hours after surgery, if your doctor okays it. Pat the cut (incision) dry. Do not take a bath for the first 2 weeks, or until your doctor tells you it is okay.    Diet     If your stomach is upset, try bland, low-fat foods such as plain rice, broiled chicken, toast, and yogurt.      Drink plenty of fluids to prevent dehydration. Choose water and other clear liquids. If you have kidney, heart, or liver disease and have to limit fluids, talk with your doctor before you increase the amount of fluids you drink.      You may notice that your bowel movements are not regular right after your surgery. This is common. Avoid constipation and straining with bowel movements. You may want to take a fiber supplement every day. If you have not had a bowel movement after a couple of days, ask your doctor about taking a mild laxative. Medicines     Your doctor will tell you if and when you can restart your medicines. You will also get instructions about taking any new medicines.      If you take aspirin or some other blood thinner, ask your doctor if and when to start taking it again. Make sure that you understand exactly what your doctor wants you to do.      Take pain medicines exactly as directed. ? If the doctor gave you a prescription medicine for pain, take it as prescribed. ? If you are not taking a prescription pain medicine, ask your doctor if you can take an over-the-counter medicine.      If your doctor prescribed antibiotics, take them as directed. Do not stop taking them just because you feel better. You need to take the full course of antibiotics.      If you think your pain medicine is making you sick to your stomach:  ? Take your medicine after meals (unless your doctor has told you not to). ? Ask your doctor for a different pain medicine. Incision care     If you have strips of tape on the incision, leave the tape on for a week or until it falls off.      Wash the area daily with warm, soapy water and pat it dry. Don't use hydrogen peroxide or alcohol, which can slow healing. You may cover the area with a gauze bandage if it weeps or rubs against clothing. Change the bandage every day. Follow-up care is a key part of your treatment and safety. Be sure to make and go to all appointments, and call your doctor if you are having problems. It's also a good idea to know your test results and keep alist of the medicines you take. When should you call for help? Call 911 anytime you think you may need emergency care. For example, call if:     You passed out (lost consciousness).      You are short of breath. Call your doctor now or seek immediate medical care if:     You have pain that does not get better after you take pain medicine.      You have loose stitches, or your incision comes open.      Bright red blood has soaked through your bandage.      You have signs of infection, such as:  ? Increased pain, swelling, warmth, or redness. ? Red streaks leading from the incision. ? Pus draining from the incision. ? A fever.      You are sick to your stomach or cannot keep fluids down.      You have signs of a blood clot in your leg (called a deep vein thrombosis), such as:  ? Pain in your calf, back of the knee, thigh, or groin. ? Redness and swelling in your leg or groin.      You cannot pass stools or gas. Watch closely for any changes in your health, and be sure to contact yourdoctor if you have any problems. Where can you learn more? Go to [email or web address]://[email or web address] org and sign in to your [employer] account. Enter X802 in the [facility] box to learn more about \"Laparoscopy: What to Expect at Home. \"     If you do not have an account, please click on the \"Sign Up Now\" link. Current as of: September 8, 2021               Content Version: 13.2  © 5022-8866 Healthwise, Incorporated. Care instructions adapted under license by [facility] ([facility]). If you have questions about a medical condition or this instruction, always ask your healthcare professional. [address] any warranty or liability for your use of this information.        Patient Education        Endometriosis: Care Instructions  Overview     Cells that are like the cells that line the inside of your uterus sometimes grow on the outside of the uterus. This is called endometriosis. These clumps of cells can cause pain and problems with your periods. They can become inflamed and may bleed. Scar tissue that forms over time can make it difficultto get pregnant. Medicines and sometimes surgery can relieve pain and may help you get pregnant. Follow-up care is a key part of your treatment and safety. Be sure to make and go to all appointments, and call your doctor if you are having problems. It's also a good idea to know your test results and keep alist of the medicines you take. How can you care for yourself at home?  Take your medicines exactly as prescribed. Call your doctor if you think you are having a problem with your medicine.  Take pain medicines exactly as directed. ? If the doctor gave you a prescription medicine for pain, take it as prescribed. ? If you are not taking a prescription pain medicine, ask your doctor if you can take an over-the-counter medicine.  Apply heat, such as a hot water bottle or a heating pad set on low, to your lower belly. Or take a warm bath. Heat may relieve pain.  Lie down and put a pillow under your knees to raise your legs. This will relieve pressure on your back. When should you call for help? Call your doctor now or seek immediate medical care if:     You have severe vaginal bleeding.      You have new or worse pain in your belly or pelvis. Watch closely for changes in your health, and be sure to contact your doctor if:     You have unusual vaginal bleeding.      You do not get better as expected. Where can you learn more? Go to [email or web address] org and sign in to your [employer] account. Enter L315 in the [facility] box to learn more about \"Endometriosis: Care Instructions. \"     If you do not have an account, please click on the \"Sign Up Now\" link. Current as of: November 22, 2021               Content Version: 13.2  © 2006-2022 Healthwise, Incorporated. Care instructions adapted under license by [facility] ([facility]). If you have questions about a medical condition or this instruction, always ask your healthcare professional. [address] any warranty or liability for your use of this information.

## 2022-05-02 NOTE — PROGRESS NOTES
Pt is here for breast exam and pap smear. Pt states she missed her period last month, but she is not pregnant, she hasn't been sexually active in a while. Pt states she has been worrying about her issues that she has been having regarding her results. Last mammogram:  na  Last pap smear:  10/19/2020  Contraception:  No intercourse.   :  1  Para:  1  AB:  0  Last bone density: na  Last colonoscopy:  na     GYN:  13 / irreg / 3.-6.

## 2022-05-03 LAB
C TRACH DNA GENITAL QL NAA+PROBE: NEGATIVE
N. GONORRHOEAE DNA: NEGATIVE
TRICHOMONAS VAGINALIS DNA: NEGATIVE

## 2022-05-03 RX ORDER — TERCONAZOLE 80 MG/1
80 SUPPOSITORY VAGINAL NIGHTLY
Qty: 7 SUPPOSITORY | Refills: 0 | Status: SHIPPED | OUTPATIENT
Start: 2022-05-03 | End: 2022-05-16 | Stop reason: SDUPTHER

## 2022-05-11 LAB
HPV COMMENT: NORMAL
HPV TYPE 16: NOT DETECTED
HPV TYPE 18: NOT DETECTED
HPVOH (OTHER TYPES): NOT DETECTED

## 2022-05-16 RX ORDER — TERCONAZOLE 80 MG/1
80 SUPPOSITORY VAGINAL NIGHTLY
Qty: 7 SUPPOSITORY | Refills: 0 | Status: SHIPPED | OUTPATIENT
Start: 2022-05-16 | End: 2022-05-16 | Stop reason: CLARIF

## 2022-09-16 DIAGNOSIS — R10.2 PELVIC PAIN IN FEMALE: Primary | ICD-10-CM

## 2022-09-29 DIAGNOSIS — N91.2 AMENORRHEA: Primary | ICD-10-CM

## 2022-12-08 ENCOUNTER — PATIENT MESSAGE (OUTPATIENT)
Dept: OBGYN CLINIC | Age: 37
End: 2022-12-08

## 2022-12-08 DIAGNOSIS — R11.0 NAUSEOUS: Primary | ICD-10-CM

## 2022-12-09 ENCOUNTER — TELEPHONE (OUTPATIENT)
Dept: OBGYN CLINIC | Age: 37
End: 2022-12-09

## 2022-12-09 NOTE — TELEPHONE ENCOUNTER
Izabella Gutierrez called to schedule a  pregnancy confirmation  . Pt wants to see Samantha    Please be advised that the best time to call her to accommodate their needs is Anytime. Thank you.

## 2022-12-19 NOTE — TELEPHONE ENCOUNTER
From: Carri Rosenthal  To: Dr. Marlin Cherry  Sent: 12/8/2022 8:13 AM CST  Subject: Positive pregnancy test    Good morning Dr. Afia Segundo,     I have a positive pregnancy test and I need to schedule an appointment with you.      Thank you- Lupe Morales

## 2022-12-20 RX ORDER — HYDROCORTISONE 25 MG/G
CREAM TOPICAL
Qty: 1 EACH | Refills: 0 | Status: SHIPPED | OUTPATIENT
Start: 2022-12-20

## 2022-12-20 RX ORDER — ONDANSETRON 4 MG/1
4 TABLET, FILM COATED ORAL DAILY PRN
Qty: 30 TABLET | Refills: 0 | Status: SHIPPED | OUTPATIENT
Start: 2022-12-20

## 2022-12-22 ENCOUNTER — PATIENT MESSAGE (OUTPATIENT)
Dept: OBGYN CLINIC | Age: 37
End: 2022-12-22

## 2022-12-22 ENCOUNTER — OFFICE VISIT (OUTPATIENT)
Dept: SURGERY | Age: 37
End: 2022-12-22
Payer: MEDICAID

## 2022-12-22 VITALS
HEIGHT: 62 IN | BODY MASS INDEX: 23.55 KG/M2 | WEIGHT: 128 LBS | TEMPERATURE: 99.2 F | SYSTOLIC BLOOD PRESSURE: 108 MMHG | DIASTOLIC BLOOD PRESSURE: 64 MMHG

## 2022-12-22 DIAGNOSIS — R11.0 NAUSEOUS: ICD-10-CM

## 2022-12-22 DIAGNOSIS — R11.0 NAUSEOUS: Primary | ICD-10-CM

## 2022-12-22 DIAGNOSIS — K64.4 EXTERNAL HEMORRHOIDS: Primary | ICD-10-CM

## 2022-12-22 PROCEDURE — 99213 OFFICE O/P EST LOW 20 MIN: CPT | Performed by: SURGERY

## 2022-12-22 RX ORDER — PROMETHAZINE HYDROCHLORIDE 25 MG/1
25 TABLET ORAL 4 TIMES DAILY PRN
Qty: 20 TABLET | Refills: 0 | Status: SHIPPED | OUTPATIENT
Start: 2022-12-22 | End: 2022-12-29

## 2022-12-22 RX ORDER — PROMETHAZINE HYDROCHLORIDE 25 MG/1
25 TABLET ORAL 4 TIMES DAILY PRN
Qty: 20 TABLET | Refills: 0 | Status: SHIPPED | OUTPATIENT
Start: 2022-12-22 | End: 2022-12-22 | Stop reason: SDUPTHER

## 2022-12-22 ASSESSMENT — ENCOUNTER SYMPTOMS
ABDOMINAL PAIN: 0
COUGH: 0
SHORTNESS OF BREATH: 0
NAUSEA: 0
SORE THROAT: 0
COLOR CHANGE: 0
EYE PAIN: 0
CHEST TIGHTNESS: 0
EYE REDNESS: 0
ABDOMINAL DISTENTION: 0
BACK PAIN: 0
CONSTIPATION: 1
DIARRHEA: 0
VOMITING: 0

## 2022-12-22 NOTE — TELEPHONE ENCOUNTER
From: Coleman Saint  To: Dr. Nereida Brown: 12/22/2022 8:41 AM CST  Subject: Extremely nauseous     Good morning,     I have been taking ondasetron for nausea but those pills are not helping none. Im still suffering from severe nausea and I have try everything that [employer] and [employer] said will help and nothing seems to help me at all. HELP PLEASE!      Gregg Castro

## 2022-12-22 NOTE — PROGRESS NOTES
SUBJECTIVE:  Ms. Simi Becker is a 40 y.o. female who presents today with burning and itching at her anus. She states this has been ongoing in the last few weeks. She is 8 weeks pregnant, and very nauseated and not able to eat much. She tries to take her fiber but is not always able to tolerate it due to her nausea associated with her pregnancy. Not a lot of pain, more burning and itching. She does also think that her powerlifting is contributing to her hemorrhoids. Patient's medications, allergies, past medical, surgical, social and family histories werereviewed and updated as appropriate. Review of Systems   Constitutional:  Negative for fatigue, fever and unexpected weight change. HENT:  Negative for hearing loss, nosebleeds and sore throat. Eyes:  Negative for pain, redness and visual disturbance. Respiratory:  Negative for cough, chest tightness and shortness of breath. Cardiovascular:  Negative for chest pain, palpitations and leg swelling. Gastrointestinal:  Positive for constipation. Negative for abdominal distention, abdominal pain, diarrhea, nausea and vomiting. Endocrine: Negative for cold intolerance, heat intolerance and polydipsia. Genitourinary:  Negative for difficulty urinating, frequency and urgency. Musculoskeletal:  Negative for back pain, joint swelling and neck pain. Skin:  Negative for color change, rash and wound. Allergic/Immunologic: Negative for environmental allergies and food allergies. Neurological:  Negative for seizures, light-headedness and headaches. Hematological:  Negative for adenopathy. Does not bruise/bleed easily. Psychiatric/Behavioral:  Negative for confusion, sleep disturbance and suicidal ideas. OBJECTIVE:  /64 (Site: Left Upper Arm, Position: Sitting, Cuff Size: Medium Adult)   Temp 99.2 °F (37.3 °C) (Temporal)   Ht 5' 2\" (1.575 m)   Wt 128 lb (58.1 kg)   BMI 23.41 kg/m²   Physical Exam  Vitals reviewed.  Exam conducted with a chaperone present. Constitutional:       Appearance: She is well-developed. HENT:      Head: Normocephalic and atraumatic. Eyes:      Pupils: Pupils are equal, round, and reactive to light. Cardiovascular:      Rate and Rhythm: Normal rate and regular rhythm. Heart sounds: Normal heart sounds. Pulmonary:      Effort: Pulmonary effort is normal.      Breath sounds: Normal breath sounds. No wheezing or rales. Abdominal:      General: Bowel sounds are normal. There is no distension. Palpations: Abdomen is soft. Tenderness: There is no abdominal tenderness. There is no guarding or rebound. Genitourinary:     Exam position: Knee-chest position. Rectum: External hemorrhoid present. No mass, tenderness, anal fissure or internal hemorrhoid. Comments: Circumferential hemorrhoidal skin tags. No signs of thrombosis. No bleeding. No internal hemorrhoids on TELMA. No blood. .   Musculoskeletal:         General: Normal range of motion. Cervical back: Normal range of motion. Lymphadenopathy:      Cervical: No cervical adenopathy. Skin:     General: Skin is warm and dry. Neurological:      Mental Status: She is alert and oriented to person, place, and time. Deep Tendon Reflexes: Reflexes are normal and symmetric. Psychiatric:         Behavior: Behavior normal.         Thought Content: Thought content normal.         Judgment: Judgment normal.       ASSESSMENT:   Diagnosis Orders   1. External hemorrhoids            PLAN:  No orders of the defined types were placed in this encounter. No orders of the defined types were placed in this encounter. Reviewed hemorrhoidal pathophysiology with the patient who notes understanding. Recommend sitz baths, fiber supplementation. Will send rectal rockets. Once her pregnancy is complete and she has recovered, happy to proceed with hemorrhoidectomy. She will follow in the office to discuss at that time. Return for PRN.     Lawrence DO Adams 12/22/2022 2:03 PM

## 2022-12-28 ENCOUNTER — TELEPHONE (OUTPATIENT)
Dept: SURGERY | Age: 37
End: 2022-12-28

## 2022-12-28 DIAGNOSIS — N91.2 AMENORRHEA: ICD-10-CM

## 2022-12-28 LAB — GONADOTROPIN, CHORIONIC (HCG) QUANT: ABNORMAL MIU/ML (ref 0–5.3)

## 2022-12-28 NOTE — TELEPHONE ENCOUNTER
Called patient and confirmed that script was called in for rectal rockets to [employer] location. She said ok and thanked me.

## 2022-12-30 ENCOUNTER — HOSPITAL ENCOUNTER (EMERGENCY)
Facility: HOSPITAL | Age: 37
Discharge: LEFT BEFORE DISCHARGE | End: 2022-12-30
Payer: MEDICAID

## 2022-12-30 VITALS
DIASTOLIC BLOOD PRESSURE: 71 MMHG | SYSTOLIC BLOOD PRESSURE: 109 MMHG | HEART RATE: 62 BPM | BODY MASS INDEX: 23.92 KG/M2 | RESPIRATION RATE: 16 BRPM | TEMPERATURE: 98.7 F | OXYGEN SATURATION: 100 % | HEIGHT: 62 IN | WEIGHT: 130 LBS

## 2022-12-30 PROCEDURE — 99211 OFF/OP EST MAY X REQ PHY/QHP: CPT

## 2022-12-30 RX ORDER — SODIUM CHLORIDE 0.9 % (FLUSH) 0.9 %
10 SYRINGE (ML) INJECTION AS NEEDED
Status: DISCONTINUED | OUTPATIENT
Start: 2022-12-30 | End: 2022-12-30 | Stop reason: HOSPADM

## 2022-12-30 NOTE — PATIENT INSTRUCTIONS
Patient Education        Weeks 6 to 10 of Your Pregnancy: Care Instructions  Your baby is growing very quickly. You may start to feel different, both in your body and your emotions. Each pregnancy is different, so there's no \"right\" way to feel. These early weeks are a time to make healthy choices for you and your baby. Take a daily prenatal vitamin. Choose one with folic acid in it. Avoid alcohol, tobacco, and drugs (including marijuana). They can harm your baby. Drink plenty of liquids. Be sure to drink enough water. And limit sodas, other sweetened drinks, and caffeine. Choose foods that are good sources of calcium, iron, and folate. You can try dark leafy greens, fortified orange juice and cereals, almonds, broccoli, dried fruit, and beans. Avoid foods that may harm your baby. Don't eat raw meat, deli meat, raw seafood, or raw eggs. Avoid soft cheese and unpasteurized dairy, like Brie and blue cheese. And don't eat fish that contains a lot of mercury, like shark and swordfish. Don't touch baljinder litter or cat poop. They can cause an infection that could harm your baby. Avoid things that can make your body too hot. For example, avoid hot tubs and saunas. Soothe morning sickness. Try eating 5 or 6 small meals a day, getting some fresh air, or using lee to control symptoms. Follow-up care is a key part of your treatment and safety. Be sure to make and go to all appointments, and call your doctor if you are having problems. It's also a good idea to know your test results and keep a list of the medicines you take. Where can you learn more? Go to http://www.woods.com/ and enter G112 to learn more about \"Weeks 6 to 10 of Your Pregnancy: Care Instructions. \"  Current as of: February 23, 2022               Content Version: 13.5  © 2155-8384 Healthwise, Incorporated. Care instructions adapted under license by [facility] ([facility]).  If you have questions about a medical condition or this instruction, always ask your healthcare professional. [address] any warranty or liability for your use of this information. Patient Education        Managing Morning Sickness: Care Instructions  Overview     Morning sickness can be the toughest part of early pregnancy. Some people feel mildly sick to their stomach, and others are running to the bathroom. The good news? Morning sickness usually gets better in the second trimester. It's likely that your hormones are to blame for morning sickness. But you can do things to feel better, like changing what you eat, avoiding certain foods and smells, and asking your doctor about medicines you can try. Follow-up care is a key part of your treatment and safety. Be sure to make and go to all appointments, and call your doctor if you are having problems. It's also a good idea to know your test results and keep a list of the medicines you take. How can you care for yourself at home? Keep food in your stomach, but not too much at once. Your nausea may be worse if your stomach is empty. Eat five or six small meals a day instead of three large meals. For morning nausea, eat a small snack, such as a couple of crackers or dry biscuits, before rising. Allow a few minutes for your stomach to settle before you get out of bed slowly. Drink plenty of fluids. If you have kidney, heart, or liver disease and have to limit fluids, talk with your doctor before you increase the amount of fluids you drink. Some women find that peppermint tea helps with nausea. Eat more protein, such as chicken, fish, lean meat, beans, nuts, and seeds. Eat carbohydrate foods, such as potatoes, whole-grain cereals, rice, and pasta. Avoid smells and foods that make you feel nauseated. Spicy or high-fat foods, citrus juice, milk, coffee, and tea with caffeine often make nausea worse. Do not drink alcohol. Do not smoke.  Try not to be around others who smoke. If you need help quitting, talk to your doctor about stop-smoking programs and medicines. These can increase your chances of quitting for good. If you are taking iron supplements, ask your doctor if they are necessary. Iron can make nausea worse. Get lots of rest. Stress and fatigue can make your morning sickness worse. Ask your doctor about taking prescription medicine, or over-the-counter products such as vitamin B6, doxylamine, or lee, to relieve your symptoms. Your doctor can tell you the doses that are safe for you. Take your prenatal vitamins at night on a full stomach. When should you call for help? Call 911 anytime you think you may need emergency care. For example, call if:    You passed out (lost consciousness). Call your doctor now or seek immediate medical care if:    You are sick to your stomach or cannot drink fluids. You have symptoms of dehydration, such as:  Dry eyes and a dry mouth. Passing only a little urine. Feeling thirstier than usual.     You are not able to keep down your medicine. You have pain in your belly or pelvis. Watch closely for changes in your health, and be sure to contact your doctor if:    You do not get better as expected. Where can you learn more? Go to http://www.parker.com/ and enter W450 to learn more about \"Managing Morning Sickness: Care Instructions. \"  Current as of: February 23, 2022               Content Version: 13.5  © 6382-4501 Healthwise, Incorporated. Care instructions adapted under license by [facility] ([facility]). If you have questions about a medical condition or this instruction, always ask your healthcare professional. [place] [address] any warranty or liability for your use of this information.        Patient Education        Screening Tests for Birth Defects: Care Instructions  Your Care Instructions     Screening tests for birth defects are done during pregnancy to look for possible problems with the baby (fetus). They show the chance that a baby has a certain birth defect. Down syndrome, spina bifida, and trisomy 25 are examples. There are many types of screening tests you may have during your pregnancy. During your first trimester you may have:  Blood tests at 10 to 13 weeks. Nuchal translucency test at 11 to 14 weeks. Cell free fetal DNA test at 10 weeks or later. During your second trimester, you may have:  Triple or quad screening at 15 to 20 weeks. Ultrasound at 18 to 20 weeks. Follow-up care is a key part of your treatment and safety. Be sure to make and go to all appointments, and call your doctor if you are having problems. It's also a good idea to know your test results and keep a list of the medicines you take. Why are these tests done? Blood tests measure the amounts of certain substances in your blood. For these tests, a health professional takes a sample of your blood. Cell free fetal DNA test is used to help find genetic problems. Triple or quad screening are blood tests that can be used to find out if there is a risk of certain health problems. If any of these tests point to a problem, your doctor will suggest other tests to find out for sure if there is a problem. Nuchal translucency test uses ultrasound to measure the thickness of the area at the back of the baby's neck. An increase in thickness can be an early sign of certain birth defects. Ultrasound is a tool that uses sound waves to make pictures of your baby and placenta inside the uterus. Ultrasound lets your doctor see an image of your baby. It can help your doctor look for problems of the heart, spine, belly, or other areas. Many pregnant women choose to have these tests done as a routine part of their care. Some choose to have tests if they are at higher risk for having a baby with a birth defect. What happens after testing?   You can return right away to your usual activities for this stage of pregnancy, unless your doctor gives you different instructions. If you are concerned or worried about the results of your tests, talk with loved ones or friends. You can also talk to a genetic counselor or your doctor. When should you call for help? Watch closely for changes in your health, and be sure to contact your doctor if you have any problems. Where can you learn more? Go to https://chpepiceweb.Sendori. org and sign in to your OxThera account. Enter 052 [id number] in the UsingMiles box to learn more about \"Screening Tests for Birth Defects: Care Instructions. \"     If you do not have an account, please click on the \"Sign Up Now\" link. Current as of: June 16, 2021               Content Version: 13.1  © 3842-2934 Healthwise, [employer]. Care instructions adapted under license by [facility] ([facility]). If you have questions about a medical condition or this instruction, always ask your healthcare professional. [place] [address] any warranty or liability for your use of this information. Patient Education        Nutrition During Pregnancy: Care Instructions  Overview     Healthy eating when you are pregnant is important for you and your baby. It can help you feel well and have a successful pregnancy and delivery. During pregnancy your nutrition needs increase. Even if you have excellent eating habits, your doctor may recommend a multivitamin to make sure you get enough iron and folic acid. You may wonder how much weight you should gain. In general, if you were at a healthy weight before you became pregnant, then you should gain between 25 and 35 pounds. If you were overweight before pregnancy, then you'll likely be advised to gain 15 to 25 pounds. If you were underweight before pregnancy, then you'll probably be advised to gain 28 to 40 pounds. Your doctor will work with you to set a weight goal that is right for you. Gaining a healthy amount of weight helps you have a healthy baby.   Follow-up care is a key part of your treatment and safety. Be sure to make and go to all appointments, and call your doctor if you are having problems. It's also a good idea to know your test results and keep a list of the medicines you take. How can you care for yourself at home? Eat plenty of fruits and vegetables. Include a variety of orange, yellow, and leafy dark-green vegetables every day. Choose whole-grain bread, cereal, and pasta. Good choices include whole wheat bread, whole wheat pasta, brown rice, and oatmeal.  Get 4 or more servings of milk and milk products each day. Good choices include nonfat or low-fat milk, yogurt, and cheese. If you cannot eat milk products, you can get calcium from calcium-fortified products such as orange juice, soy milk, and tofu. Other non-milk sources of calcium include leafy green vegetables, such as broccoli, kale, mustard greens, turnip greens, bok jessica, and brussels sprouts. If you eat meat, pick lower-fat types. Good choices include lean cuts of meat and chicken or turkey without the skin. Do not eat shark, swordfish, juan mackerel, or tilefish. They have high levels of mercury, which is dangerous to your baby. You can eat up to 12 ounces a week of fish or shellfish that have low mercury levels. Good choices include shrimp, wild salmon, pollock, and catfish. Limit some other types of fish, such as white (albacore) tuna, to 4 oz (0.1 kg) a week. Heat lunch meats (such as turkey, ham, or bologna) to 165°F before you eat them. This reduces your risk of getting sick from a kind of bacteria that can be found in lunch meats. Do not eat unpasteurized soft cheeses, such as brie, feta, fresh mozzarella, and blue cheese. They have a bacteria that could harm your baby. Limit caffeine. If you drink coffee or tea, have no more than 1 cup a day. Caffeine is also found in kyara. Do not drink any alcohol. No amount of alcohol has been found to be safe during pregnancy.   Do not diet or try to lose weight. For example, do not follow a low-carbohydrate diet. If you are overweight at the start of your pregnancy, your doctor will work with you to manage your weight gain. Tell your doctor about all vitamins and supplements you take. When should you call for help? Watch closely for changes in your health, and be sure to contact your doctor if you have any problems. Where can you learn more? Go to http://www.woods.com/ and enter Y785 to learn more about \"Nutrition During Pregnancy: Care Instructions. \"  Current as of: May 9, 2022               Content Version: 13.5  © 3340-6652 [employer]. Care instructions adapted under license by [facility] ([facility]). If you have questions about a medical condition or this instruction, always ask your healthcare professional. [address] any warranty or liability for your use of this information. Patient Education        Pregnancy Precautions: Care Instructions  Your Care Instructions     There is no sure way to prevent labor before your due date ( labor) or to prevent most other pregnancy problems. But there are things you can do to increase your chances of a healthy pregnancy. Go to your appointments, follow your doctor's advice, and take good care of yourself. Eat well, and exercise (if your doctor agrees). And make sure to drink plenty of water. Follow-up care is a key part of your treatment and safety. Be sure to make and go to all appointments, and call your doctor if you are having problems. It's also a good idea to know your test results and keep a list of the medicines you take. How can you care for yourself at home? Make sure you go to your prenatal appointments. At each visit, your doctor will check your blood pressure. Your doctor will also check to see if you have protein in your urine. High blood pressure and protein in urine are signs of preeclampsia.  This condition can be dangerous for you and your baby.  Drink plenty of fluids. Dehydration can cause contractions. If you have kidney, heart, or liver disease and have to limit fluids, talk with your doctor before you increase the amount of fluids you drink. Tell your doctor right away if you notice any symptoms of an infection, such as:  Burning when you urinate. A foul-smelling discharge from your vagina. Vaginal itching. Unexplained fever. Unusual pain or soreness in your uterus or lower belly. Eat a balanced diet. Include plenty of foods that are high in calcium and iron. Foods high in calcium include milk, cheese, yogurt, almonds, and broccoli. Foods high in iron include red meat, shellfish, poultry, eggs, beans, raisins, whole-grain bread, and leafy green vegetables. Do not smoke. If you need help quitting, talk to your doctor about stop-smoking programs and medicines. These can increase your chances of quitting for good. Do not drink alcohol or use marijuana or illegal drugs. Follow your doctor's directions about activity. Your doctor will let you know how much, if any, exercise you can do. Ask your doctor if you can have sex. If you are at risk for early labor, your doctor may ask you to not have sex. Take care to prevent falls. During pregnancy, your joints are loose, and your balance is off. Sports such as bicycling, skiing, or in-line skating can increase your risk of falling. And don't ride horses or motorcycles, dive, water ski, scuba dive, or parachute jump while you are pregnant. Avoid things that can make your body too hot and may be harmful to your baby, such as a hot tub or sauna. Or talk with your doctor before doing anything that raises your body temperature. Your doctor can tell you if it's safe. Do not take any over-the-counter or herbal medicines or supplements without talking to your doctor or pharmacist first.  When should you call for help? Call 911  anytime you think you may need emergency care.  For example, call if: You passed out (lost consciousness). You have a seizure. You have severe vaginal bleeding. You have severe pain in your belly or pelvis. You have had fluid gushing or leaking from your vagina and you know or think the umbilical cord is bulging into your vagina. If this happens, immediately get down on your knees so your rear end (buttocks) is higher than your head. This will decrease the pressure on the cord until help arrives. Call your doctor now or seek immediate medical care if:    You have signs of preeclampsia, such as:  Sudden swelling of your face, hands, or feet. New vision problems (such as dimness, blurring, or seeing spots). A severe headache. You have any vaginal bleeding. You have belly pain or cramping. You have a fever. You have had regular contractions (with or without pain) for an hour. This means that you have 8 or more within 1 hour or 4 or more in 20 minutes after you change your position and drink fluids. You have a sudden release of fluid from your vagina. You have low back pain or pelvic pressure that does not go away. You notice that your baby has stopped moving or is moving much less than normal.   Watch closely for changes in your health, and be sure to contact your doctor if you have any problems. Where can you learn more? Go to http://www.woods.com/ and enter Y951 to learn more about \"Pregnancy Precautions: Care Instructions. \"  Current as of: February 23, 2022               Content Version: 13.5  © 2006-2022 Healthwise, Incorporated. Care instructions adapted under license by [facility] ([facility]). If you have questions about a medical condition or this instruction, always ask your healthcare professional. [address] any warranty or liability for your use of this information.

## 2023-01-03 ENCOUNTER — INITIAL PRENATAL (OUTPATIENT)
Dept: OBGYN CLINIC | Age: 38
End: 2023-01-03
Payer: MEDICAID

## 2023-01-03 VITALS — WEIGHT: 129 LBS | SYSTOLIC BLOOD PRESSURE: 90 MMHG | BODY MASS INDEX: 23.59 KG/M2 | DIASTOLIC BLOOD PRESSURE: 60 MMHG

## 2023-01-03 DIAGNOSIS — Z36.9 ANTENATAL SCREENING ENCOUNTER: ICD-10-CM

## 2023-01-03 DIAGNOSIS — O09.521 AMA (ADVANCED MATERNAL AGE) MULTIGRAVIDA 35+, FIRST TRIMESTER: Primary | ICD-10-CM

## 2023-01-03 DIAGNOSIS — O34.211 MATERNAL CARE DUE TO LOW TRANSVERSE UTERINE SCAR FROM PREVIOUS CESAREAN DELIVERY: ICD-10-CM

## 2023-01-03 PROCEDURE — 99213 OFFICE O/P EST LOW 20 MIN: CPT | Performed by: ADVANCED PRACTICE MIDWIFE

## 2023-01-03 RX ORDER — METOCLOPRAMIDE 10 MG/1
10 TABLET ORAL
Qty: 30 TABLET | Refills: 3 | Status: SHIPPED | OUTPATIENT
Start: 2023-01-03

## 2023-01-03 NOTE — PROGRESS NOTES
CNM Prenatal Office Note  Subjective:  Demetrio Walsh is here for initial obstetrical visit. She has had confirmation of pregnancy. Her history has been reviewed and OB complications identified. Today she is 9w2d weeks EGA. OB Complications Identified:  Hx LTUI  AMA    Problems/Complaints today:  Constipation - mild, last BM yesterday  N/v - zofran and phenergan hasn't helped  Objective: Mother's Prenatal Vitals  BP: 90/60  Weight: 129 lb (58.5 kg)  Patient Position: Sitting  Prenatal Fetal Information  Movement: Absent  Pt is A&Ox3, in no acute distress. Normocephalic, atraumatic. PERRL. Resp even and non-labored. Skin pink, warm & dry. Gravid abdomen. BIRD's well. Gait steady. Assessment:    IUP at 9w2d wks      Diagnosis Orders   1. AMA (advanced maternal age) multigravida 33+, first trimester        2.  screening encounter  Chlamydia/N. Gonorrhoeae/T. Vaginalis    Herpes simplex virus (HSV) I/II antibodies IgG & IgM w/ reflex    HIV Obstetric Panel    Culture, Urine    Hepatitis C Antibody    Varicella Zoster Antibody, IgG      3. Maternal care due to low transverse uterine scar from previous  delivery          Plan:  Problems/Complaints Management Plan:  N/v - will try reglan 10mg ac & hs  Constipation - increase fiber and water, limit zofran, add reglan  Routine OB Management Plan:  Pt counseled on balanced nutrition, adequate fluid intake, taking PNV daily, and exercise. Continue with routine prenatal care. RTC in 4 wks for prenatal visit, u/s, physical exam, and results  MEDICATIONS:  Orders Placed This Encounter   Medications    metoclopramide (REGLAN) 10 MG tablet     Sig: Take 1 tablet by mouth 3 times daily (with meals)     Dispense:  30 tablet     Refill:  3     ORDERS:  Orders Placed This Encounter   Procedures    Chlamydia/N. Gonorrhoeae/T. Vaginalis    Culture, Urine    Herpes simplex virus (HSV) I/II antibodies IgG & IgM w/ reflex    HIV Obstetric Panel    Hepatitis C Antibody    Varicella Zoster Antibody, IgG

## 2023-01-03 NOTE — PROGRESS NOTES
Patient presents today for routine prenatal care. Pt denies any vaginal leaking bleeding or contractions. + Fetal movement. Having rash all over body that started a few weeks ago. Comes and goes. Pt does have niece with cleft palate another with unknown brain issues. Wanting genetic testing. Pt was having BV every month. Wants checked. Going to [facility] at 12:30 today.

## 2023-01-04 DIAGNOSIS — K64.4 EXTERNAL HEMORRHOIDS: Primary | ICD-10-CM

## 2023-01-04 RX ORDER — HYDROCORTISONE 25 MG/G
CREAM TOPICAL
Qty: 1 EACH | Refills: 3 | Status: SHIPPED | OUTPATIENT
Start: 2023-01-04

## 2023-01-12 DIAGNOSIS — O09.521 AMA (ADVANCED MATERNAL AGE) MULTIGRAVIDA 35+, FIRST TRIMESTER: Primary | ICD-10-CM

## 2023-01-12 DIAGNOSIS — O34.211 MATERNAL CARE DUE TO LOW TRANSVERSE UTERINE SCAR FROM PREVIOUS CESAREAN DELIVERY: ICD-10-CM

## 2023-01-23 DIAGNOSIS — Z36.9 ANTENATAL SCREENING ENCOUNTER: ICD-10-CM

## 2023-01-23 LAB
ABO/RH: NORMAL
ANTIBODY SCREEN: NORMAL
HEPATITIS C ANTIBODY INTERPRETATION: NORMAL

## 2023-01-24 ENCOUNTER — PATIENT MESSAGE (OUTPATIENT)
Dept: OBGYN CLINIC | Age: 38
End: 2023-01-24

## 2023-01-24 DIAGNOSIS — O09.529 ANTEPARTUM MULTIGRAVIDA OF ADVANCED MATERNAL AGE: Primary | ICD-10-CM

## 2023-01-24 LAB
C. TRACHOMATIS DNA ,URINE: NOT DETECTED
N. GONORRHOEAE DNA, URINE: NOT DETECTED
TRICHOMONAS VAGINALIS DNA, URINE: NOT DETECTED

## 2023-01-25 DIAGNOSIS — O09.529 ANTEPARTUM MULTIGRAVIDA OF ADVANCED MATERNAL AGE: ICD-10-CM

## 2023-01-25 LAB
ORGANISM: ABNORMAL
URINE CULTURE, ROUTINE: ABNORMAL
URINE CULTURE, ROUTINE: ABNORMAL

## 2023-01-25 NOTE — TELEPHONE ENCOUNTER
From: Janina Franco  To: Dodie Haile APRN - CNM  Sent: 1/24/2023 11:09 AM CST  Subject: Gender reveal     Good morning Samantha, I forgot to mention that we are having a gender reveal with our family. When you found out about our baby gender can you please let know so I can  the envelope with the results.        Thank you- Rajinder Garnica

## 2023-01-26 LAB
BASOPHILS ABSOLUTE: 0 K/UL (ref 0–0.2)
BASOPHILS RELATIVE PERCENT: 0.3 % (ref 0–1)
EOSINOPHILS ABSOLUTE: 0.2 K/UL (ref 0–0.6)
EOSINOPHILS RELATIVE PERCENT: 1.9 % (ref 0–5)
HCT VFR BLD CALC: 38.7 % (ref 37–47)
HEMOGLOBIN: 13.4 G/DL (ref 12–16)
HEPATITIS B SURFACE ANTIGEN INTERPRETATION: ABNORMAL
HIV-1 P24 AG: ABNORMAL
IMMATURE GRANULOCYTES #: 0 K/UL
LYMPHOCYTES ABSOLUTE: 1.6 K/UL (ref 1.1–4.5)
LYMPHOCYTES RELATIVE PERCENT: 19.7 % (ref 20–40)
MCH RBC QN AUTO: 31.5 PG (ref 27–31)
MCHC RBC AUTO-ENTMCNC: 34.6 G/DL (ref 33–37)
MCV RBC AUTO: 90.8 FL (ref 81–99)
MONOCYTES ABSOLUTE: 0.7 K/UL (ref 0–0.9)
MONOCYTES RELATIVE PERCENT: 8.4 % (ref 0–10)
NEUTROPHILS ABSOLUTE: 5.5 K/UL (ref 1.5–7.5)
NEUTROPHILS RELATIVE PERCENT: 69.6 % (ref 50–65)
PDW BLD-RTO: 12.1 % (ref 11.5–14.5)
PLATELET # BLD: 210 K/UL (ref 130–400)
PMV BLD AUTO: 10.6 FL (ref 9.4–12.3)
RAPID HIV 1&2: ABNORMAL
RBC # BLD: 4.26 M/UL (ref 4.2–5.4)
RPR: ABNORMAL
RUBELLA ANTIBODY IGG: REACTIVE
WBC # BLD: 7.9 K/UL (ref 4.8–10.8)

## 2023-01-27 ENCOUNTER — HOSPITAL ENCOUNTER (EMERGENCY)
Age: 38
Discharge: HOME | End: 2023-01-27
Payer: MEDICAID

## 2023-01-27 VITALS
SYSTOLIC BLOOD PRESSURE: 101 MMHG | TEMPERATURE: 97.8 F | OXYGEN SATURATION: 100 % | RESPIRATION RATE: 16 BRPM | DIASTOLIC BLOOD PRESSURE: 69 MMHG | HEIGHT: 62 IN | WEIGHT: 128 LBS | HEART RATE: 66 BPM | BODY MASS INDEX: 23.55 KG/M2

## 2023-01-27 DIAGNOSIS — O21.9 NAUSEA AND VOMITING IN PREGNANCY: Primary | ICD-10-CM

## 2023-01-27 LAB
ALBUMIN SERPL-MCNC: 4.3 G/DL (ref 3.5–5.2)
ALP BLD-CCNC: 53 U/L (ref 35–104)
ALT SERPL-CCNC: 11 U/L (ref 5–33)
ANION GAP SERPL CALCULATED.3IONS-SCNC: 10 MMOL/L (ref 7–19)
AST SERPL-CCNC: 20 U/L (ref 5–32)
BACTERIA: NEGATIVE /HPF
BASOPHILS ABSOLUTE: 0.1 K/UL (ref 0–0.2)
BASOPHILS RELATIVE PERCENT: 0.6 % (ref 0–1)
BILIRUB SERPL-MCNC: 0.4 MG/DL (ref 0.2–1.2)
BILIRUBIN URINE: NEGATIVE
BLOOD, URINE: NEGATIVE
BUN BLDV-MCNC: 12 MG/DL (ref 6–20)
CALCIUM SERPL-MCNC: 9.4 MG/DL (ref 8.6–10)
CHLORIDE BLD-SCNC: 101 MMOL/L (ref 98–111)
CLARITY: ABNORMAL
CO2: 24 MMOL/L (ref 22–29)
COLOR: YELLOW
CREAT SERPL-MCNC: 0.5 MG/DL (ref 0.5–0.9)
CRYSTALS, UA: ABNORMAL /HPF
EOSINOPHILS ABSOLUTE: 0.1 K/UL (ref 0–0.6)
EOSINOPHILS RELATIVE PERCENT: 1.2 % (ref 0–5)
EPITHELIAL CELLS, UA: 13 /HPF (ref 0–5)
GFR SERPL CREATININE-BSD FRML MDRD: >60 ML/MIN/{1.73_M2}
GLUCOSE BLD-MCNC: 91 MG/DL (ref 74–109)
GLUCOSE URINE: NEGATIVE MG/DL
GONADOTROPIN, CHORIONIC (HCG) QUANT: ABNORMAL MIU/ML (ref 0–5.3)
HCT VFR BLD CALC: 39.6 % (ref 37–47)
HEMOGLOBIN: 13.3 G/DL (ref 12–16)
HYALINE CASTS: 5 /HPF (ref 0–8)
IMMATURE GRANULOCYTES #: 0 K/UL
KETONES, URINE: 40 MG/DL
LEUKOCYTE ESTERASE, URINE: ABNORMAL
LIPASE: 35 U/L (ref 13–60)
LYMPHOCYTES ABSOLUTE: 2.2 K/UL (ref 1.1–4.5)
LYMPHOCYTES RELATIVE PERCENT: 27.1 % (ref 20–40)
MCH RBC QN AUTO: 30.5 PG (ref 27–31)
MCHC RBC AUTO-ENTMCNC: 33.6 G/DL (ref 33–37)
MCV RBC AUTO: 90.8 FL (ref 81–99)
MONOCYTES ABSOLUTE: 0.6 K/UL (ref 0–0.9)
MONOCYTES RELATIVE PERCENT: 7.2 % (ref 0–10)
NEUTROPHILS ABSOLUTE: 5.1 K/UL (ref 1.5–7.5)
NEUTROPHILS RELATIVE PERCENT: 63.5 % (ref 50–65)
NITRITE, URINE: NEGATIVE
PDW BLD-RTO: 12 % (ref 11.5–14.5)
PH UA: 7 (ref 5–8)
PLATELET # BLD: 217 K/UL (ref 130–400)
PMV BLD AUTO: 10.2 FL (ref 9.4–12.3)
POTASSIUM SERPL-SCNC: 4.3 MMOL/L (ref 3.5–5)
PROTEIN UA: NEGATIVE MG/DL
RBC # BLD: 4.36 M/UL (ref 4.2–5.4)
RBC UA: 6 /HPF (ref 0–4)
SODIUM BLD-SCNC: 135 MMOL/L (ref 136–145)
SPECIFIC GRAVITY UA: 1.01 (ref 1–1.03)
TOTAL PROTEIN: 7.3 G/DL (ref 6.6–8.7)
UROBILINOGEN, URINE: 0.2 E.U./DL
WBC # BLD: 8 K/UL (ref 4.8–10.8)
WBC UA: 5 /HPF (ref 0–5)

## 2023-01-27 PROCEDURE — 84702 CHORIONIC GONADOTROPIN TEST: CPT

## 2023-01-27 PROCEDURE — 87086 URINE CULTURE/COLONY COUNT: CPT

## 2023-01-27 PROCEDURE — 81001 URINALYSIS AUTO W/SCOPE: CPT

## 2023-01-27 PROCEDURE — 99284 EMERGENCY DEPT VISIT MOD MDM: CPT | Performed by: PEDIATRICS

## 2023-01-27 PROCEDURE — 36415 COLL VENOUS BLD VENIPUNCTURE: CPT

## 2023-01-27 PROCEDURE — 83690 ASSAY OF LIPASE: CPT

## 2023-01-27 PROCEDURE — 2580000003 HC RX 258: Performed by: PHYSICIAN ASSISTANT

## 2023-01-27 PROCEDURE — 96374 THER/PROPH/DIAG INJ IV PUSH: CPT

## 2023-01-27 PROCEDURE — 85025 COMPLETE CBC W/AUTO DIFF WBC: CPT

## 2023-01-27 PROCEDURE — 80053 COMPREHEN METABOLIC PANEL: CPT

## 2023-01-27 PROCEDURE — 6360000002 HC RX W HCPCS: Performed by: PHYSICIAN ASSISTANT

## 2023-01-27 RX ORDER — ONDANSETRON 2 MG/ML
4 INJECTION INTRAMUSCULAR; INTRAVENOUS ONCE
Status: COMPLETED | OUTPATIENT
Start: 2023-01-27 | End: 2023-01-27

## 2023-01-27 RX ORDER — METOCLOPRAMIDE 10 MG/1
10 TABLET ORAL 4 TIMES DAILY
Qty: 20 TABLET | Refills: 3 | Status: SHIPPED | OUTPATIENT
Start: 2023-01-27

## 2023-01-27 RX ORDER — 0.9 % SODIUM CHLORIDE 0.9 %
1000 INTRAVENOUS SOLUTION INTRAVENOUS ONCE
Status: COMPLETED | OUTPATIENT
Start: 2023-01-27 | End: 2023-01-27

## 2023-01-27 RX ADMIN — SODIUM CHLORIDE 1000 ML: 9 INJECTION, SOLUTION INTRAVENOUS at 19:52

## 2023-01-27 RX ADMIN — ONDANSETRON 4 MG: 2 INJECTION INTRAMUSCULAR; INTRAVENOUS at 19:52

## 2023-01-27 ASSESSMENT — ENCOUNTER SYMPTOMS
VOMITING: 1
ABDOMINAL PAIN: 0
DIARRHEA: 0
BLOOD IN STOOL: 0
NAUSEA: 1
CONSTIPATION: 0
ABDOMINAL DISTENTION: 0
SHORTNESS OF BREATH: 0

## 2023-01-27 ASSESSMENT — PAIN SCALES - GENERAL: PAINLEVEL_OUTOF10: 2

## 2023-01-27 ASSESSMENT — PAIN - FUNCTIONAL ASSESSMENT: PAIN_FUNCTIONAL_ASSESSMENT: 0-10

## 2023-01-27 NOTE — Clinical Note
Gin Duarte was seen and treated in our emergency department on 1/27/2023. She may return to work on 01/30/2023. If you have any questions or concerns, please don't hesitate to call.       Lola Garcia

## 2023-01-28 ENCOUNTER — HOSPITAL ENCOUNTER (EMERGENCY)
Age: 38
Discharge: HOME | End: 2023-01-28
Payer: MEDICAID

## 2023-01-28 ENCOUNTER — APPOINTMENT (OUTPATIENT)
Dept: ULTRASOUND IMAGING | Age: 38
End: 2023-01-28
Payer: MEDICAID

## 2023-01-28 VITALS
HEART RATE: 65 BPM | DIASTOLIC BLOOD PRESSURE: 64 MMHG | SYSTOLIC BLOOD PRESSURE: 100 MMHG | TEMPERATURE: 98.2 F | RESPIRATION RATE: 16 BRPM | OXYGEN SATURATION: 98 %

## 2023-01-28 DIAGNOSIS — R10.9 ABDOMINAL PAIN DURING PREGNANCY IN FIRST TRIMESTER: ICD-10-CM

## 2023-01-28 DIAGNOSIS — O21.9 VOMITING OF PREGNANCY, ANTEPARTUM: Primary | ICD-10-CM

## 2023-01-28 DIAGNOSIS — O26.891 ABDOMINAL PAIN DURING PREGNANCY IN FIRST TRIMESTER: ICD-10-CM

## 2023-01-28 LAB
ALBUMIN SERPL-MCNC: 3.8 G/DL (ref 3.5–5.2)
ALP BLD-CCNC: 38 U/L (ref 35–104)
ALT SERPL-CCNC: 11 U/L (ref 5–33)
ANION GAP SERPL CALCULATED.3IONS-SCNC: 8 MMOL/L (ref 7–19)
AST SERPL-CCNC: 17 U/L (ref 5–32)
BASOPHILS ABSOLUTE: 0 K/UL (ref 0–0.2)
BASOPHILS RELATIVE PERCENT: 0.5 % (ref 0–1)
BILIRUB SERPL-MCNC: 0.3 MG/DL (ref 0.2–1.2)
BILIRUBIN URINE: NEGATIVE
BLOOD, URINE: NEGATIVE
BUN BLDV-MCNC: 12 MG/DL (ref 6–20)
CALCIUM SERPL-MCNC: 8.6 MG/DL (ref 8.6–10)
CHLORIDE BLD-SCNC: 101 MMOL/L (ref 98–111)
CLARITY: CLEAR
CO2: 24 MMOL/L (ref 22–29)
COLOR: YELLOW
CREAT SERPL-MCNC: 0.4 MG/DL (ref 0.5–0.9)
EOSINOPHILS ABSOLUTE: 0.1 K/UL (ref 0–0.6)
EOSINOPHILS RELATIVE PERCENT: 1.1 % (ref 0–5)
GFR SERPL CREATININE-BSD FRML MDRD: >60 ML/MIN/{1.73_M2}
GLUCOSE BLD-MCNC: 84 MG/DL (ref 74–109)
GLUCOSE URINE: NEGATIVE MG/DL
HCT VFR BLD CALC: 35.9 % (ref 37–47)
HEMOGLOBIN: 12.4 G/DL (ref 12–16)
IMMATURE GRANULOCYTES #: 0 K/UL
KETONES, URINE: =>160 MG/DL
LEUKOCYTE ESTERASE, URINE: NEGATIVE
LYMPHOCYTES ABSOLUTE: 1.7 K/UL (ref 1.1–4.5)
LYMPHOCYTES RELATIVE PERCENT: 21.5 % (ref 20–40)
MCH RBC QN AUTO: 31 PG (ref 27–31)
MCHC RBC AUTO-ENTMCNC: 34.5 G/DL (ref 33–37)
MCV RBC AUTO: 89.8 FL (ref 81–99)
MONOCYTES ABSOLUTE: 0.6 K/UL (ref 0–0.9)
MONOCYTES RELATIVE PERCENT: 7.5 % (ref 0–10)
NEUTROPHILS ABSOLUTE: 5.4 K/UL (ref 1.5–7.5)
NEUTROPHILS RELATIVE PERCENT: 68.9 % (ref 50–65)
NITRITE, URINE: NEGATIVE
PDW BLD-RTO: 12 % (ref 11.5–14.5)
PH UA: 7 (ref 5–8)
PLATELET # BLD: 208 K/UL (ref 130–400)
PMV BLD AUTO: 10 FL (ref 9.4–12.3)
POTASSIUM REFLEX MAGNESIUM: 3.9 MMOL/L (ref 3.5–5)
PROTEIN UA: NEGATIVE MG/DL
RBC # BLD: 4 M/UL (ref 4.2–5.4)
SODIUM BLD-SCNC: 133 MMOL/L (ref 136–145)
SPECIFIC GRAVITY UA: 1.02 (ref 1–1.03)
TOTAL PROTEIN: 6.4 G/DL (ref 6.6–8.7)
UROBILINOGEN, URINE: 1 E.U./DL
WBC # BLD: 7.9 K/UL (ref 4.8–10.8)

## 2023-01-28 PROCEDURE — 81003 URINALYSIS AUTO W/O SCOPE: CPT

## 2023-01-28 PROCEDURE — 99284 EMERGENCY DEPT VISIT MOD MDM: CPT

## 2023-01-28 PROCEDURE — 76801 OB US < 14 WKS SINGLE FETUS: CPT | Performed by: RADIOLOGY

## 2023-01-28 PROCEDURE — 85025 COMPLETE CBC W/AUTO DIFF WBC: CPT

## 2023-01-28 PROCEDURE — 76801 OB US < 14 WKS SINGLE FETUS: CPT

## 2023-01-28 PROCEDURE — 6370000000 HC RX 637 (ALT 250 FOR IP): Performed by: NURSE PRACTITIONER

## 2023-01-28 PROCEDURE — 96374 THER/PROPH/DIAG INJ IV PUSH: CPT

## 2023-01-28 PROCEDURE — 6360000002 HC RX W HCPCS: Performed by: NURSE PRACTITIONER

## 2023-01-28 PROCEDURE — 36415 COLL VENOUS BLD VENIPUNCTURE: CPT

## 2023-01-28 PROCEDURE — 80053 COMPREHEN METABOLIC PANEL: CPT

## 2023-01-28 PROCEDURE — 2580000003 HC RX 258: Performed by: NURSE PRACTITIONER

## 2023-01-28 PROCEDURE — 96375 TX/PRO/DX INJ NEW DRUG ADDON: CPT

## 2023-01-28 RX ORDER — METOCLOPRAMIDE HYDROCHLORIDE 5 MG/ML
10 INJECTION INTRAMUSCULAR; INTRAVENOUS ONCE
Status: COMPLETED | OUTPATIENT
Start: 2023-01-28 | End: 2023-01-28

## 2023-01-28 RX ORDER — ACETAMINOPHEN 500 MG
1000 TABLET ORAL ONCE
Status: COMPLETED | OUTPATIENT
Start: 2023-01-28 | End: 2023-01-28

## 2023-01-28 RX ORDER — ONDANSETRON 2 MG/ML
4 INJECTION INTRAMUSCULAR; INTRAVENOUS ONCE
Status: COMPLETED | OUTPATIENT
Start: 2023-01-28 | End: 2023-01-28

## 2023-01-28 RX ORDER — 0.9 % SODIUM CHLORIDE 0.9 %
1000 INTRAVENOUS SOLUTION INTRAVENOUS ONCE
Status: COMPLETED | OUTPATIENT
Start: 2023-01-28 | End: 2023-01-28

## 2023-01-28 RX ORDER — ONDANSETRON 4 MG/1
4 TABLET, ORALLY DISINTEGRATING ORAL ONCE
Status: DISCONTINUED | OUTPATIENT
Start: 2023-01-28 | End: 2023-01-28

## 2023-01-28 RX ADMIN — ONDANSETRON 4 MG: 2 INJECTION INTRAMUSCULAR; INTRAVENOUS at 17:19

## 2023-01-28 RX ADMIN — SODIUM CHLORIDE 1000 ML: 9 INJECTION, SOLUTION INTRAVENOUS at 15:37

## 2023-01-28 RX ADMIN — SODIUM CHLORIDE 1000 ML: 9 INJECTION, SOLUTION INTRAVENOUS at 18:39

## 2023-01-28 RX ADMIN — METOCLOPRAMIDE 10 MG: 5 INJECTION, SOLUTION INTRAMUSCULAR; INTRAVENOUS at 18:36

## 2023-01-28 RX ADMIN — ACETAMINOPHEN 1000 MG: 500 TABLET ORAL at 17:13

## 2023-01-28 ASSESSMENT — PAIN - FUNCTIONAL ASSESSMENT: PAIN_FUNCTIONAL_ASSESSMENT: 0-10

## 2023-01-28 ASSESSMENT — ENCOUNTER SYMPTOMS
ABDOMINAL PAIN: 1
DIARRHEA: 1
VOMITING: 1

## 2023-01-28 ASSESSMENT — PAIN DESCRIPTION - LOCATION: LOCATION: ABDOMEN

## 2023-01-28 ASSESSMENT — PAIN SCALES - GENERAL: PAINLEVEL_OUTOF10: 4

## 2023-01-28 NOTE — ED PROVIDER NOTES
[facility]  eMERGENCY dEPARTMENT eNCOUnter      Pt Name: Jian Tierney  MRN: [id number]  [place] 1985  Date of evaluation: 1/27/2023  Provider: Yani Lr Dozier Rimma       Chief Complaint   Patient presents with    Nausea    Emesis     Pt states she is 10 weeks pregnant, onset of worsening nausea/emesis since yesterday         HISTORY OF PRESENT ILLNESS   (Location/Symptom, Timing/Onset,Context/Setting, Quality, Duration, Modifying Factors, Severity)  Note limiting factors. Jian Tierney is a G2, P1 approximately 10-week pregnant 40 y.o. female who presents to the emergency department with complaint of nausea and vomiting. She states that she has been having difficulty with nausea and vomiting over the last few weeks. She states it is gotten a lot worse since yesterday. She states she is barely been able to hold down any liquids and was concerned about mild dehydration. She is established with lakshmi Vance here at [facility]. She denies any associated abdominal pain, cramping, vaginal bleeding, or vaginal discharge. She denies any associated blood in her vomit. She denies stool changes. She denies dysuria, hematuria, or changes in frequency. She denies fever or chills. NursingNotes were reviewed. REVIEW OF SYSTEMS    (2-9 systems for level 4, 10 or more for level 5)     Review of Systems   Constitutional:  Negative for chills and fever. Respiratory:  Negative for shortness of breath. Cardiovascular:  Negative for chest pain. Gastrointestinal:  Positive for nausea and vomiting. Negative for abdominal distention, abdominal pain, blood in stool, constipation and diarrhea. Genitourinary:  Negative for dysuria, flank pain, frequency, hematuria, vaginal bleeding and vaginal discharge. Musculoskeletal:  Negative for myalgias. Neurological:  Negative for headaches. All other systems reviewed and are negative.          PAST MEDICALHISTORY     Past Medical History:   Diagnosis Date    Hemorrhoids          SURGICAL HISTORY       Past Surgical History:   Procedure Laterality Date    BREAST SURGERY      implants     SECTION N/A 2020     SECTION performed by Gaylyn Paget, MD at [address] [place] [address] L&D [address] [place] [address],[address]     Discharge Medication List as of 2023  9:31 PM        CONTINUE these medications which have NOT CHANGED    Details   Prenatal MV-Min-Fe Fum-FA-DHA (PRENATAL 1 PO) Take by mouthHistorical Med      !! hydrocortisone (ANUSOL-HC) 2.5 % CREA rectal cream Apply to rectum TID for itching., Disp-1 each, R-3, Normal      ondansetron (ZOFRAN) 4 MG tablet Take 1 tablet by mouth daily as needed for Nausea or Vomiting, Disp-30 tablet, R-0Normal      !! hydrocortisone (ANUSOL-HC) 2.5 % CREA rectal cream Use twice daily as needed, Disp-1 each, R-0, Normal       !! - Potential duplicate medications found. Please discuss with provider. ALLERGIES     Patient has no known allergies.     FAMILY HISTORY       Family History   Problem Relation Age of Onset    Colon Cancer Neg Hx     Colon Polyps Neg Hx     Esophageal Cancer Neg Hx     Liver Cancer Neg Hx     Liver Disease Neg Hx     Rectal Cancer Neg Hx     Stomach Cancer Neg Hx           SOCIAL HISTORY       Social History     Socioeconomic History    Marital status:      Spouse name: None    Number of children: None    Years of education: None    Highest education level: None   Tobacco Use    Smoking status: Never    Smokeless tobacco: Never   Vaping Use    Vaping Use: Never used   Substance and Sexual Activity    Alcohol use: No    Drug use: No    Sexual activity: Not Currently     Partners: Male       SCREENINGS    Eastman Coma Scale  Eye Opening: Spontaneous  Best Verbal Response: Oriented  Best Motor Response: Obeys commands  Bree Coma Scale Score: 15        PHYSICAL EXAM    (up to 7 for level 4, 8 or more for level 5)     ED Triage Vitals [01/27/23 1656]   BP Temp Temp Source Heart Rate Resp SpO2 Height Weight   109/74 98.1 °F (36.7 °C) Oral 69 18 99 % 5' 2\" (1.575 m) 128 lb (58.1 kg)       Physical Exam  Vitals and nursing note reviewed. Constitutional:       General: She is not in acute distress. Appearance: Normal appearance. She is normal weight. She is not ill-appearing, toxic-appearing or diaphoretic. HENT:      Head: Normocephalic and atraumatic. Cardiovascular:      Rate and Rhythm: Normal rate and regular rhythm. Pulses: Normal pulses. Heart sounds: Normal heart sounds. Pulmonary:      Effort: Pulmonary effort is normal. No respiratory distress. Breath sounds: Normal breath sounds. Chest:      Chest wall: No tenderness. Abdominal:      General: There is no distension. Palpations: Abdomen is soft. Tenderness: There is no abdominal tenderness. Musculoskeletal:      Cervical back: Normal range of motion and neck supple. No rigidity or tenderness. Lymphadenopathy:      Cervical: No cervical adenopathy. Skin:     General: Skin is warm and dry. Neurological:      General: No focal deficit present. Mental Status: She is alert and oriented to person, place, and time.        DIAGNOSTIC RESULTS     LABS:  Labs Reviewed   COMPREHENSIVE METABOLIC PANEL - Abnormal; Notable for the following components:       Result Value    Sodium 135 (*)     All other components within normal limits   URINALYSIS WITH REFLEX TO CULTURE - Abnormal; Notable for the following components:    Clarity, UA CLOUDY (*)     Ketones, Urine 40 (*)     Leukocyte Esterase, Urine TRACE (*)     All other components within normal limits   HCG, QUANTITATIVE, PREGNANCY - Abnormal; Notable for the following components:    hCG Quant 373416.0 (*)     All other components within normal limits   MICROSCOPIC URINALYSIS - Abnormal; Notable for the following components:    Bacteria, UA NEGATIVE (*)     Crystals, UA NEG (*)     RBC, UA 6 (*) All other components within normal limits   CBC WITH AUTO DIFFERENTIAL   LIPASE       All other labs were within normal range or not returned as of this dictation. EMERGENCY DEPARTMENT COURSE and DIFFERENTIAL DIAGNOSIS/MDM:   Vitals:    Vitals:    01/27/23 1656 01/27/23 2115   BP: 109/74 101/69   Pulse: 69 66   Resp: 18 16   Temp: 98.1 °F (36.7 °C) 97.8 °F (36.6 °C)   TempSrc: Oral    SpO2: 99% 100%   Weight: 128 lb (58.1 kg)    Height: 5' 2\" (1.575 m)        MDM  Patient is a 28 6Y female presents the ER with vomiting for the last few weeks with pregnancy that has worsened over the last 24 to 48 hours. Her vital signs in the ER were within normal limits. CBC did not show leukocytosis or anemia. CMP was negative for significant electrolyte disturbance, KAMLA, or LFT elevation. Lipase is normal so low suspicion for pancreatitis. On her urinalysis, she did have 5 white blood cells but there was also 13 epithelial cells so I have concern for contaminated sample. She had no bacteria or nitrates supporting UTI. There was signs of ketones supporting mild dehydration. After a liter of fluids and a dose of Zofran in the ER, the patient felt significantly improved and was ready to be discharged. Due to the poor sample, I did encourage that she repeat urinalysis when following up with OB/GYN. I will also monitor for urine culture to see if antibiotic needs to be added. I did refill her Reglan outpatient and stressed the importance of follow-up. All of her questions were answered. I went over return precautions with her and she verbalized understanding. FINAL IMPRESSION      1.  Nausea and vomiting in pregnancy          DISPOSITION/PLAN   DISPOSITION Decision To Discharge 01/27/2023 09:18:30 PM      PATIENT REFERRED TO:  SHAYNE Martinez CNM  [facility] [address]  [phone]            DISCHARGE MEDICATIONS:  Discharge Medication List as of 1/27/2023  9:31 PM             (Please note that portions of this note were completed with a voice recognition program.  Efforts were made to edit thedictations but occasionally words are mis-transcribed.)    BARB Kathleen (electronically signed)     Lola Kathleen  01/27/23 5301

## 2023-01-28 NOTE — ED PROVIDER NOTES
[facility] EMERGENCY DEPT  EMERGENCY DEPARTMENT ENCOUNTER      Pt Name: Ceci Cuevas  MRN: [id number]  [place] 1985  Date of evaluation: 1/28/2023  Provider: Brianne Vallejo [place] Katrina       Chief Complaint   Patient presents with    Abdominal Pain     Patient states that she was seen here last night for nausea/vomiting, that has continued today and is now having lower abdominal pain; patient will be 11 weeks pregnant tomorrow          PHYSICAL EXAM    (up to 7 for level 4, 8 or more for level 5)     ED Triage Vitals [01/28/23 1459]   BP Temp Temp src Heart Rate Resp SpO2 Height Weight   108/67 98.2 °F (36.8 °C) -- 62 18 97 % -- --       Physical Exam    DIAGNOSTIC RESULTS     EKG: All EKG's are interpreted by the Emergency Department Physician who either signs or Co-signs this chart in the absence of a cardiologist.        RADIOLOGY:   Non-plain film images such as CT, Ultrasound and MRI are read by the radiologist. Moni Gore radiographicimages are visualized and preliminarily interpreted by the emergency physician with the below findings:        US OB LESS THAN 14 WEEKS SINGLE OR FIRST GESTATION   Final Result   SINGLE INTRAUTERINE PREGNANCY APPROXIMATELY 12 WEEKS :1 DAYS WITH NORMAL CARDIAC   SOMATIC MOVEMENT. I WOULD RECOMMEND A MORE DETAILED FETAL ANATOMICAL SURVEY AT   22 WEEKS. No acute findings         LABS:  Labs Reviewed   CBC WITH AUTO DIFFERENTIAL - Abnormal; Notable for the following components:       Result Value    RBC 4.00 (*)     Hematocrit 35.9 (*)     Neutrophils % 68.9 (*)     All other components within normal limits   COMPREHENSIVE METABOLIC PANEL W/ REFLEX TO MG FOR LOW K - Abnormal; Notable for the following components:    Sodium 133 (*)     Creatinine 0.4 (*)     Total Protein 6.4 (*)     All other components within normal limits   URINALYSIS WITH REFLEX TO CULTURE       All other labs were within normal range or not returned as of this dictation.     EMERGENCY DEPARTMENT COURSE and DIFFERENTIALDIAGNOSIS/MDM:   Vitals:    Vitals:    01/28/23 1459 01/28/23 1523   BP: 108/67    Pulse: 62 62   Resp: 18    Temp: 98.2 °F (36.8 °C)    SpO2: 97%        MDM     Amount and/or Complexity of Data Reviewed  Clinical lab tests: ordered and reviewed    Risk of Complications, Morbidity, and/or Mortality  Presenting problems: low  Diagnostic procedures: low  Management options: low    Patient Progress  Patient progress: stable      Reassessment  Pt is resting on cot, no s/sx of distress. No vomiting. Lower abdominal pain mild. Patient is to receive another liter of normal saline and additional meds for nausea. Vital signs are stable. Patient instructed to follow-up with her OB. Continue to take her Zofran as needed for nausea at home. She is welcome to return here with any worsening or persistence of her symptoms. CONSULTS:  None    PROCEDURES:  Unless otherwise noted below, none     Procedures        FINAL IMPRESSION   No diagnosis found. DISPOSITION/PLAN   DISPOSITION     PATIENT REFERRED TO:  No follow-up provider specified.     DISCHARGE MEDICATIONS:  New Prescriptions    No medications on file          (Please note that portions of this note were completed with a voice recognition program.  Efforts were made to edit the dictations but occasionally words aremis-transcribed.)    SHAYNE De Leon CNP (electronically signed)  Emergency Physician        SHAYNE De Leon CNP  01/28/23 2401

## 2023-01-28 NOTE — ED PROVIDER NOTES
[facility] EMERGENCY DEPT  eMERGENCY dEPARTMENT eNCOUnter      Pt Name: Vanita Ashby  MRN: [id number]  [place] 1985  Date of evaluation: 2023  Provider: SHAYNE Langford    CHIEF COMPLAINT       Chief Complaint   Patient presents with    Abdominal Pain     Patient states that she was seen here last night for nausea/vomiting, that has continued today and is now having lower abdominal pain; patient will be 11 weeks pregnant tomorrow          HISTORY OF PRESENT ILLNESS   (Location/Symptom, Timing/Onset,Context/Setting, Quality, Duration, Modifying Factors, Severity)  Note limiting factors. Vanita Ashby is a 40 y.o. female who presents to the emergency department with vomiting and low abd pain. Pt was seen here yesterday and given fluids because of vomiting. She is 11 weeks pregnant with her second child. Just woke up this am with low abd pain. No vaginal bleeding    The history is provided by the patient. Abdominal Pain  Pain location:  Suprapubic  Pain quality: aching    Pain severity:  Moderate  Onset quality:  Sudden  Duration:  1 day  Timing:  Constant  Associated symptoms: diarrhea and vomiting    Associated symptoms: no dysuria, no fever and no vaginal bleeding      NursingNotes were reviewed. REVIEW OF SYSTEMS    (2-9 systems for level 4, 10 or more for level 5)     Review of Systems   Constitutional:  Negative for fever. Gastrointestinal:  Positive for abdominal pain, diarrhea and vomiting. Genitourinary:  Negative for dysuria and vaginal bleeding. Except as noted above the remainder of the review of systems was reviewed and negative.        PAST MEDICAL HISTORY     Past Medical History:   Diagnosis Date    Hemorrhoids          SURGICALHISTORY       Past Surgical History:   Procedure Laterality Date    BREAST SURGERY      implants     SECTION N/A 2020     SECTION performed by Benny Waggoner MD at [facility] L&D OR    [address] Discharge Medication List as of 1/28/2023  6:48 PM        CONTINUE these medications which have NOT CHANGED    Details   Prenatal MV-Min-Fe Fum-FA-DHA (PRENATAL 1 PO) Take by mouthHistorical Med      metoclopramide (REGLAN) 10 MG tablet Take 1 tablet by mouth 4 times daily, Disp-20 tablet, R-3Normal      !! hydrocortisone (ANUSOL-HC) 2.5 % CREA rectal cream Apply to rectum TID for itching., Disp-1 each, R-3, Normal      ondansetron (ZOFRAN) 4 MG tablet Take 1 tablet by mouth daily as needed for Nausea or Vomiting, Disp-30 tablet, R-0Normal      !! hydrocortisone (ANUSOL-HC) 2.5 % CREA rectal cream Use twice daily as needed, Disp-1 each, R-0, Normal       !! - Potential duplicate medications found. Please discuss with provider. Patient has no known allergies. FAMILY HISTORY       Family History   Problem Relation Age of Onset    Colon Cancer Neg Hx     Colon Polyps Neg Hx     Esophageal Cancer Neg Hx     Liver Cancer Neg Hx     Liver Disease Neg Hx     Rectal Cancer Neg Hx     Stomach Cancer Neg Hx           SOCIAL HISTORY       Social History     Socioeconomic History    Marital status:      Spouse name: None    Number of children: None    Years of education: None    Highest education level: None   Tobacco Use    Smoking status: Never    Smokeless tobacco: Never   Vaping Use    Vaping Use: Never used   Substance and Sexual Activity    Alcohol use: No    Drug use: No    Sexual activity: Not Currently     Partners: Male       SCREENINGS    Bree Coma Scale  Eye Opening: Spontaneous  Best Verbal Response: Oriented  Best Motor Response: Obeys commands  Bree Coma Scale Score: 15        PHYSICAL EXAM    (up to 7 for level 4, 8 or more for level 5)     ED Triage Vitals [01/28/23 1459]   BP Temp Temp src Heart Rate Resp SpO2 Height Weight   108/67 98.2 °F (36.8 °C) -- 62 18 97 % -- --       Physical Exam  Vitals and nursing note reviewed.    Constitutional:       Appearance: Normal appearance. She is well-developed. HENT:      Head: Normocephalic and atraumatic. Eyes:      General: No scleral icterus. Right eye: No discharge. Left eye: No discharge. Cardiovascular:      Rate and Rhythm: Normal rate. Pulmonary:      Effort: No respiratory distress. Abdominal:      General: Abdomen is flat. Bowel sounds are normal.      Palpations: Abdomen is soft. Tenderness: There is abdominal tenderness in the suprapubic area. There is no guarding or rebound. Hernia: No hernia is present. Musculoskeletal:      Cervical back: Normal range of motion and neck supple. Neurological:      Mental Status: She is alert and oriented to person, place, and time. Psychiatric:         Behavior: Behavior normal.       RESULTS     EKG: All EKG's are interpreted by the Emergency Department Physician who either signs or Co-signsthis chart in the absence of a cardiologist.        RADIOLOGY:   Non-plain filmimages such as CT, Ultrasound and MRI are read by the radiologist. Plain radiographic images are visualized and preliminarily interpreted by the emergency physician with the below findings:      Interpretation per the Radiologist below, if available at the time of this note:    US OB LESS THAN 14 [address] [facility]   Final Result   SINGLE INTRAUTERINE PREGNANCY APPROXIMATELY 12 WEEKS :1 [address] BARBER WOULD RECOMMEND A MORE DETAILED FETAL ANATOMICAL SURVEY AT   22 WEEKS.             ED BEDSIDEULTRASOUND:   Performed by ED Physician -none    LABS:  Labs Reviewed   CBC WITH AUTO DIFFERENTIAL - Abnormal; Notable for the following components:       Result Value    RBC 4.00 (*)     Hematocrit 35.9 (*)     Neutrophils % 68.9 (*)     All other components within normal limits   COMPREHENSIVE METABOLIC PANEL W/ REFLEX TO MG FOR LOW K - Abnormal; Notable for the following components:    Sodium 133 (*)     Creatinine 0.4 (*)     Total Protein 6.4 (*) All other components within normal limits   URINALYSIS WITH REFLEX TO CULTURE       All other labs were within normal range or not returned as of this dictation. EMERGENCY DEPARTMENT COURSE and DIFFERENTIALDIAGNOSIS/MDM:   Vitals:    Vitals:    01/28/23 1459 01/28/23 1523 01/28/23 1856   BP: 108/67  100/64   Pulse: 62 62 65   Resp: 18  16   Temp: 98.2 °F (36.8 °C)     SpO2: 97%  98%           MDM  Left pt in care of Gilles Carcamo. Awaiting US results and another liter of fluids to infuse           CONSULTS:  None    PROCEDURES:  Unless otherwise noted below, none     Procedures    FINAL IMPRESSION      1. Vomiting of pregnancy, antepartum    2.  Abdominal pain during pregnancy in first trimester        DISPOSITION/PLAN   DISPOSITION Decision To Discharge 01/28/2023 06:56:20 PM      PATIENT REFERRED TO:  Trevor Babin MD  [address]  [address] [phone]    Schedule an appointment as soon as possible for a visit in 2 days  may return to ED as needed    DISCHARGE MEDICATIONS:  Discharge Medication List as of 1/28/2023  6:48 PM             (Please note that portions of this note were completed with a voice recognitionprogram.  Efforts were made to edit the dictations but occasionally words are mis-transcribed.)    SHAYNE Everett (electronically signed)           SHAYNE Everett  01/29/23 9596

## 2023-01-29 LAB — URINE CULTURE, ROUTINE: NORMAL

## 2023-01-30 LAB
ORGANISM: ABNORMAL
URINE CULTURE, ROUTINE: ABNORMAL
URINE CULTURE, ROUTINE: ABNORMAL
VZV IGG SER QL IA: 1.19

## 2023-02-02 RX ORDER — PRENATAL 71/IRON/FOLIC AC/DHA 30-1.4-2
1 CAPSULE,IMMEDIATE, DELAY RELEASE,BIPHASE ORAL DAILY
Qty: 30 CAPSULE | Refills: 5 | Status: SHIPPED | OUTPATIENT
Start: 2023-02-02 | End: 2023-02-15 | Stop reason: SDUPTHER

## 2023-02-15 ENCOUNTER — ROUTINE PRENATAL (OUTPATIENT)
Dept: OBGYN CLINIC | Age: 38
End: 2023-02-15

## 2023-02-15 VITALS — BODY MASS INDEX: 23.96 KG/M2 | DIASTOLIC BLOOD PRESSURE: 60 MMHG | WEIGHT: 131 LBS | SYSTOLIC BLOOD PRESSURE: 100 MMHG

## 2023-02-15 DIAGNOSIS — Z36.9 ANTENATAL SCREENING ENCOUNTER: ICD-10-CM

## 2023-02-15 DIAGNOSIS — O34.211 MATERNAL CARE DUE TO LOW TRANSVERSE UTERINE SCAR FROM PREVIOUS CESAREAN DELIVERY: ICD-10-CM

## 2023-02-15 DIAGNOSIS — O09.521 AMA (ADVANCED MATERNAL AGE) MULTIGRAVIDA 35+, FIRST TRIMESTER: ICD-10-CM

## 2023-02-15 DIAGNOSIS — O09.529 ANTEPARTUM MULTIGRAVIDA OF ADVANCED MATERNAL AGE: ICD-10-CM

## 2023-02-15 DIAGNOSIS — Z3A.15 15 WEEKS GESTATION OF PREGNANCY: Primary | ICD-10-CM

## 2023-02-15 DIAGNOSIS — R11.0 NAUSEOUS: ICD-10-CM

## 2023-02-15 RX ORDER — PRENATAL 71/IRON/FOLIC AC/DHA 30-1.4-2
1 CAPSULE,IMMEDIATE, DELAY RELEASE,BIPHASE ORAL DAILY
Qty: 30 CAPSULE | Refills: 5 | Status: SHIPPED | OUTPATIENT
Start: 2023-02-15

## 2023-02-15 RX ORDER — ONDANSETRON 4 MG/1
4 TABLET, FILM COATED ORAL DAILY PRN
Qty: 30 TABLET | Refills: 0 | Status: SHIPPED | OUTPATIENT
Start: 2023-02-15

## 2023-02-15 NOTE — PROGRESS NOTES
CNM Prenatal Office Note  Subjective:  Claudio Pedraza is here for a return obstetrical visit. Today she is 14w1d weeks EGA. She is taking her prenatal vitamins and is aware of nutrition needs. She reports the following:    Problems/complaints today:  N/v has improved  Not taking prenatal viatmins  Objective: Mother's Prenatal Vitals  BP: 100/60  Weight: 131 lb (59.4 kg)  Patient Position: Sitting  Prenatal Fetal Information  Fetal HR: 160  Movement: Absent  Pt is A&Ox3, in no acute distress. Normocephalic, atraumatic. PERRL. Resp even and non-labored. Skin pink, warm & dry. Gravid abdomen. BIRD's well. Gait steady. Assessment:    IUP at 14w1d wks      Diagnosis Orders   1.  screening encounter        2. 15 weeks gestation of pregnancy        3. Antepartum multigravida of advanced maternal age        3. AMA (advanced maternal age) multigravida 33+, first trimester        5. Nauseous        6. Maternal care due to low transverse uterine scar from previous  delivery          Plan:  Problems/complaints Management Plan:  none  Routine OB Management Plan:  Pt counseled on balanced nutrition, adequate fluid intake, taking PNV daily, and exercise along with  nutrition and vitamin. Continue with routine prenatal care. RTC in 4 wks for prenatal visit. MEDICATIONS:  No orders of the defined types were placed in this encounter. ORDERS:  No orders of the defined types were placed in this encounter. More than 50% of this 20 min visit was education and counseling. Derrell Interiano

## 2023-02-15 NOTE — PATIENT INSTRUCTIONS
Patient Education        Weeks 14 to 18 of Your Pregnancy: Care Instructions  Around this time, you may start to look pregnant. Your baby is now able to pass urine. And the first stool (meconium) is starting to collect in your baby's intestines. Hair is starting to grow on your baby's head. You may notice some skin changes, such as itchy spots on your palms or acne on your face. At your next doctor visit, you may have an ultrasound. So you might think about whether you want to know the sex of your baby. Also ask your doctor about flu and COVID-19 shots. How to reduce stress   Ask for help when you need it. Try to avoid things that cause you stress. Seek out things that relieve stress, such as a warm bath or yoga. How to get exercise   If you don't usually exercise, start slowly with short walks. Try to be active 30 minutes a day, at least 5 days a week. Avoid activities where you're more likely to fall. Use light weights to reduce stress on your joints. How to stay at a healthy weight for you   Talk to your doctor or midwife about how much weight you should gain. It's generally best to gain:  About 28 to 40 pounds if you're underweight. About 25 to 35 pounds if you're at a healthy weight. About 15 to 25 pounds if you're overweight. About 11 to 20 pounds if you're very overweight (obese). Follow-up care is a key part of your treatment and safety. Be sure to make and go to all appointments, and call your doctor if you are having problems. It's also a good idea to know your test results and keep a list of the medicines you take. Where can you learn more? Go to http://www.woods.com/ and enter I453 to learn more about \"Weeks 14 to 18 of Your Pregnancy: Care Instructions. \"  Current as of: February 23, 2022               Content Version: 13.5  © 5510-3335 Healthwise, Incorporated. Care instructions adapted under license by [facility] ([facility]).  If you have questions about a medical condition or this instruction, always ask your healthcare professional. [address] any warranty or liability for your use of this information. Patient Education        Learning About Dental Care During Pregnancy  Why is dental care important during pregnancy? It's important to take care of your body when you are pregnant. This includes your teeth and gums. A healthy mouth--and good dental habits--will help you and your baby. Taking care of your teeth while you are pregnant helps prevent cavities and other dental problems. Brush, floss, and try to limit sugary foods and drinks. Getting the right vitamins and nutrients is good for your baby's teeth, which begin to form before birth. And remember that it's safe--and a good idea--to visit the dentist during your pregnancy. What changes in your mouth during pregnancy? Some changes in your mouth during pregnancy are normal and should go away after your baby is born. You have more blood flow to the mucous membranes of the mouth and gums when you are pregnant. This may cause bleeding in your gums, especially when you brush your teeth. Your gums may be more swollen and tender than usual. Try using a toothbrush with soft bristles. Your teeth might feel a little loose. This usually does not cause any problems and goes away after pregnancy. If you have morning sickness or digestive problems like reflux, stomach acid in your mouth can weaken your teeth. This may make them feel more sensitive and makes cavities more likely. Regular brushing can help. Keep brushing gently, even if your teeth feel more sensitive or your gums bleed. How can you care for your teeth during pregnancy? Keep brushing your teeth twice a day and try to floss once a day. It's fine to use toothpaste with fluoride in it while you are pregnant. Fluoride helps prevent tooth decay.   If you gag when brushing, try using a toothbrush with a smaller head or toothpaste that doesn't foam when you brush. Eat a balanced, nutritious diet and get the right amount of vitamins and minerals. You may have cravings for sugary snacks and drinks, but too much sugar can lead to cavities. If you have reflux or morning sickness, rinse out your mouth. Try rinsing with a mixture of one teaspoon of baking soda in a cup of water. Wait at least 20 minutes before brushing. You might also ask your doctor if you can take over-the-counter medicine for reflux. Be safe with medicines. Read and follow all instructions on the label. Do not smoke when you are pregnant or allow others to smoke around you. If you need help quitting, talk to your doctor about stop-smoking programs and medicines. These can increase your chances of quitting for good. Regular visits to your dentist during pregnancy are important to prevent problems. Tell your dentist that you are pregnant. Dental X-rays and local anesthesia are generally safe during pregnancy. Most dental work can be done while you are pregnant. If you go to the dentist during the second or third trimesters, you may be more comfortable sitting in a different position in the dental chair. Delaying dental care can make a problem worse. Follow-up care is a key part of your treatment and safety. Be sure to make and go to all appointments, and call your doctor if you are having problems. It's also a good idea to know your test results and keep a list of the medicines you take. Where can you learn more? Go to http://www.woods.com/ and enter G168 to learn more about \"Learning About Dental Care During Pregnancy. \"  Current as of: February 23, 2022               Content Version: 13.5  © 2006-2022 Healthwise, Incorporated. Care instructions adapted under license by [facility] ([facility]).  If you have questions about a medical condition or this instruction, always ask your healthcare professional. [place] [address] any warranty or liability for your use of this information.

## 2023-02-15 NOTE — PROGRESS NOTES
Patient presents today for routine prenatal care. Pt denies any vaginal leaking bleeding or contractions. - Fetal movement. Needs zofran refilled and the PNV was making her sick. States PNV weren't called in.  Having some lower abdomen pain

## 2023-03-15 ENCOUNTER — INITIAL PRENATAL (OUTPATIENT)
Dept: OBSTETRICS AND GYNECOLOGY | Facility: CLINIC | Age: 38
End: 2023-03-15
Payer: MEDICAID

## 2023-03-15 VITALS — WEIGHT: 157 LBS | BODY MASS INDEX: 28.72 KG/M2 | SYSTOLIC BLOOD PRESSURE: 108 MMHG | DIASTOLIC BLOOD PRESSURE: 62 MMHG

## 2023-03-15 DIAGNOSIS — Z34.82 MULTIGRAVIDA IN SECOND TRIMESTER: Primary | ICD-10-CM

## 2023-03-15 DIAGNOSIS — O34.219 PREVIOUS CESAREAN DELIVERY, ANTEPARTUM: ICD-10-CM

## 2023-03-15 DIAGNOSIS — O09.529 ANTEPARTUM MULTIGRAVIDA OF ADVANCED MATERNAL AGE: ICD-10-CM

## 2023-03-15 DIAGNOSIS — Z78.9 NON-SMOKER: ICD-10-CM

## 2023-03-15 PROBLEM — D37.01 NEOPLASM OF UNCERTAIN BEHAVIOR OF LIP: Status: RESOLVED | Noted: 2017-06-08 | Resolved: 2023-03-15

## 2023-03-15 PROBLEM — K13.79 LESION OF HARD PALATE: Status: RESOLVED | Noted: 2017-06-08 | Resolved: 2023-03-15

## 2023-03-15 LAB
GLUCOSE UR STRIP-MCNC: NEGATIVE MG/DL
PROT UR STRIP-MCNC: NEGATIVE MG/DL

## 2023-03-15 PROCEDURE — 99213 OFFICE O/P EST LOW 20 MIN: CPT | Performed by: ADVANCED PRACTICE MIDWIFE

## 2023-03-15 RX ORDER — HYDROCORTISONE 25 MG/G
CREAM TOPICAL
COMMUNITY
Start: 2023-01-04

## 2023-03-15 NOTE — PROGRESS NOTES
37-year old patient arrived to initiate prenatal care, as a transfer from another practice/hospital.    HPI: 37-year old . Patient's last menstrual period was 10/24/2022 (approximate). Reports she is doing well. She voices no concerns. Denies vaginal bleeding, leaking fluid, pelvic pain, or cramping. Prepregnancy weight of 129 pounds.    Previous prenatal history significant for: C/S x 1 (failure to progress - arrest of dilation)  History significant for: hemangioma of the lip    The following portion of the patient's history were reviewed and updated as needed: allergies, current medications, past family history, past medical history, social history, surgical history, and problem list.    ROS: All systems reviewed and are negative.      US ordered today, reviewed and shows IUP of 18w5d gestation and Estimated Date of Delivery: 23  Last Pap Smear: 2022 NILM    Exam:  Wt: 157 lb for TWG of 12.7 kg (28 lb), B/P 108/62, FHTs 140   General Appearance:  healthy-appearing .  HEENT:  NCAT, EOMI, neck supple, no thyroidmegaly.  HR str and reg. No murmur. Lungs clear. Resp even and unlabored.  Abd: Soft, nontender. Bowel sounds active. Uterus is consistent with EGA.  Ext: NT, nontender. No cyanosis or edema.    Diagnoses and all orders for this visit:    1. Multigravida in second trimester (Primary)  - Reviewed information in new OB packet, including OTC medications for use during pregnancy, second timester of pregnancy and discomforts, regular OB routine.  Second Trimester of Pregnancy video and Round Ligament Pain education included in AVS.  - Advised to maintain regular activity.  - Reviewed and encouraged pt to report vaginal bleeding, pelvic pain or cramping, any prolonged illness or infection, or any other concerns.  - RTO in 4 weeks with Dr. Sinha and as needed with concerns  - Reviewed ffDNA screening.  -     ToxASSURE Select 13 (MW) - Urine, Clean Catch  -     Ambulatory Referral to  MFM/Perinatology    2. Previous  delivery, antepartum  - Previous for  delivery for failure to progress - arrest of dilation. Patient would like for TOLAC. If she has a repeat  section, she would like to have bilateral salpingectomy at the same time.    3. Antepartum multigravida of advanced maternal age    4. Non-smoker        This note has been signed electronically.    Jena Ramon, DNP, APRN, CNM, RNC-OB

## 2023-03-17 ENCOUNTER — REFERRAL TRIAGE (OUTPATIENT)
Dept: LABOR AND DELIVERY | Facility: HOSPITAL | Age: 38
End: 2023-03-17
Payer: MEDICAID

## 2023-03-21 ENCOUNTER — PATIENT OUTREACH (OUTPATIENT)
Dept: LABOR AND DELIVERY | Facility: HOSPITAL | Age: 38
End: 2023-03-21
Payer: MEDICAID

## 2023-03-21 LAB — DRUGS UR: NORMAL

## 2023-03-21 NOTE — OUTREACH NOTE
[facility]  Enrollment    Current Estimated Gestational Age: 19w4d    Questions/Answers    Flowsheet Row Responses   Would like to participate? Yes   Date of Intake Visit 03/21/23          [facility]  Intake    Current Estimated Gestational Age: 19w4d    Intake Assessment    Flowsheet Row Responses   Best Method for Contacting Cell   Able to keep appointments as scheduled Yes   Gender(s) and Name(s) MALE   Do you have a dentist? Yes  [DENTAL CLINICS SENT TO CLIENT IN RESOUCE LETTER]   Have you seen a dentist in the last 6 months No   Resources Presently Utilizing: WIC (Women, Infant, Children)   Maternal Warning Signs Provided  [SENT TO CLIENT IN MYCHART]   Other: Provided  [SENT TO CLIENT IN AVS]   Other Education HANDS, How to find a dentist, How to find a pediatrician, How to find a primary care provider, Insurance benefits/Incentives, Meds to Beds, Mental Health Services, Smoking/Vaping Cessation, SNAP Benefits, Substance Use Disorder Treatment, Transportation Assistance, WIC Benefits          Learning Assessment    Flowsheet Row Responses   Relationship Patient   Does the learner have any barriers to learning? No Barriers   What is the preferred language of the learner for medical teaching? English   How does the learner prefer to learn new concepts? Listening, Reading, Demonstration, Pictures/Video          Resource letter, prenatal education and maternal warning signs sent to client in her mychart.       Keily Burt RN  Maternity Nurse Navigator    3/21/2023, 15:46 CDT          Keily Burt RN  Maternity Nurse Navigator    3/21/2023, 15:46 CDT

## 2023-03-22 ENCOUNTER — TELEPHONE (OUTPATIENT)
Dept: OBSTETRICS AND GYNECOLOGY | Facility: CLINIC | Age: 38
End: 2023-03-22
Payer: MEDICAID

## 2023-03-22 NOTE — TELEPHONE ENCOUNTER
Patient called back and stated she could not do that day or time, I gave patient phone number for MFM to reschedule the appointment. Patient understood

## 2023-03-22 NOTE — TELEPHONE ENCOUNTER
Called patient on 3/22/2023 to give her the date and time of her upcoming appointment with Eddie. I was only able to leave a VM. Spoke with Hafsa at [facility] & she will try to call her as well with her appointment. This is scheduled for 3/23/23 at 9:30 am.

## 2023-04-13 ENCOUNTER — ROUTINE PRENATAL (OUTPATIENT)
Dept: OBSTETRICS AND GYNECOLOGY | Facility: CLINIC | Age: 38
End: 2023-04-13
Payer: COMMERCIAL

## 2023-04-13 VITALS — BODY MASS INDEX: 25.79 KG/M2 | WEIGHT: 141 LBS | SYSTOLIC BLOOD PRESSURE: 110 MMHG | DIASTOLIC BLOOD PRESSURE: 70 MMHG

## 2023-04-13 DIAGNOSIS — O34.219 PREVIOUS CESAREAN DELIVERY, ANTEPARTUM: Primary | ICD-10-CM

## 2023-04-13 DIAGNOSIS — O09.529 ANTEPARTUM MULTIGRAVIDA OF ADVANCED MATERNAL AGE: ICD-10-CM

## 2023-04-13 LAB
GLUCOSE UR STRIP-MCNC: NEGATIVE MG/DL
PROT UR STRIP-MCNC: NEGATIVE MG/DL

## 2023-04-13 NOTE — PROGRESS NOTES
Good fetal movement  Prior  for labor arrest at 7cm with 9 pound baby  Discussed desire for TOLAC, chance of success, risk of uterine rupture  Reviewed normal anatomy US, fetal echo scheduled for heart history of first child  Glucola and Hgb ordered for next visit    Diagnoses and all orders for this visit:    1. Previous  delivery, antepartum (Primary)    2. Antepartum multigravida of advanced maternal age

## 2023-05-11 ENCOUNTER — ROUTINE PRENATAL (OUTPATIENT)
Dept: OBSTETRICS AND GYNECOLOGY | Facility: CLINIC | Age: 38
End: 2023-05-11
Payer: COMMERCIAL

## 2023-05-11 VITALS — SYSTOLIC BLOOD PRESSURE: 108 MMHG | WEIGHT: 148 LBS | DIASTOLIC BLOOD PRESSURE: 62 MMHG | BODY MASS INDEX: 27.07 KG/M2

## 2023-05-11 DIAGNOSIS — O34.219 PREVIOUS CESAREAN DELIVERY, ANTEPARTUM: Primary | ICD-10-CM

## 2023-05-11 DIAGNOSIS — O09.529 ANTEPARTUM MULTIGRAVIDA OF ADVANCED MATERNAL AGE: ICD-10-CM

## 2023-05-11 NOTE — PROGRESS NOTES
Good fetal movement  Glucola and Hgb today, Rh positive  Discussed [place] Acosta contractions    Diagnoses and all orders for this visit:    1. Previous  delivery, antepartum (Primary)  -     Gestational Screen 1 Hr ([facility])  -     Hemoglobin    2. Antepartum multigravida of advanced maternal age  -     Gestational Screen 1 Hr ([facility])  -     Hemoglobin

## 2023-05-12 LAB
GLUCOSE 1H P 50 G GLC PO SERPL-MCNC: 154 MG/DL (ref 65–139)
HGB BLD-MCNC: 11.6 G/DL (ref 12–15.9)

## 2023-05-15 ENCOUNTER — TELEPHONE (OUTPATIENT)
Dept: OBSTETRICS AND GYNECOLOGY | Facility: CLINIC | Age: 38
End: 2023-05-15
Payer: COMMERCIAL

## 2023-05-15 ENCOUNTER — PATIENT OUTREACH (OUTPATIENT)
Dept: LABOR AND DELIVERY | Facility: HOSPITAL | Age: 38
End: 2023-05-15
Payer: COMMERCIAL

## 2023-05-15 DIAGNOSIS — R73.09 ABNORMAL GLUCOSE TOLERANCE TEST: Primary | ICD-10-CM

## 2023-05-15 NOTE — TELEPHONE ENCOUNTER
----- Message from Aakash Sinha MD sent at 5/15/2023 11:33 AM CDT -----  Please notify patient that her 1 hour glucose is elevated.  She needs to do a 3-hour glucose test.  Her hemoglobin is good.

## 2023-05-15 NOTE — OUTREACH NOTE
[facility]  Unable to Reach       Questions/Answers      Flowsheet Row Responses   Pending Outreach Prenatal Check-in   Call Attempt First   Outcome No answer/busy, MyChart message sent to patient                Keily Burt RN  Maternity Nurse Navigator    5/15/2023, 13:36 CDT

## 2023-05-15 NOTE — TELEPHONE ENCOUNTER
Pt informed by phone of abnormal 1h GTT  and the need to do 3h gtt. Pt informed of testing instructions and voiced understanding, will come tomorrow for test. Pt also informed of normal hgb and voiced understanding.

## 2023-05-15 NOTE — OUTREACH NOTE
[facility]  Check-In    Current Estimated Gestational Age: 27w3d      Questions/Answers      Flowsheet Row Responses   Best Method for Contacting Cell   Able to keep appointments as scheduled Yes   Gender(s) and Name(s) male   Baby Active/Feeling Fetal Movemen Yes   How are you presently feeling? client states she is feeling so much better.   Supplies ready for baby Car Seat, Clothing, Crib, Diapers, Feeding Supplies   Resource/Environmental Concerns None   Do you have any questions related to your care experience, your pregnancy, plans for delivery, any concerns, etc? No            Tour of unit completed.  Resource letter and maternal warning signs sent to client in her mychart.       Keily Burt RN  Maternity Nurse Navigator    5/15/2023, 15:22 CDT

## 2023-05-17 LAB
GLUCOSE 1H P 100 G GLC PO SERPL-MCNC: 119 MG/DL (ref 65–179)
GLUCOSE 2H P 100 G GLC PO SERPL-MCNC: 78 MG/DL (ref 65–154)
GLUCOSE 3H P 100 G GLC PO SERPL-MCNC: 83 MG/DL (ref 65–139)
GLUCOSE P FAST SERPL-MCNC: 71 MG/DL (ref 65–94)

## 2023-05-23 ENCOUNTER — ROUTINE PRENATAL (OUTPATIENT)
Dept: OBSTETRICS AND GYNECOLOGY | Facility: CLINIC | Age: 38
End: 2023-05-23
Payer: COMMERCIAL

## 2023-05-23 VITALS — SYSTOLIC BLOOD PRESSURE: 106 MMHG | DIASTOLIC BLOOD PRESSURE: 68 MMHG | WEIGHT: 150 LBS | BODY MASS INDEX: 27.44 KG/M2

## 2023-05-23 DIAGNOSIS — Z71.85 IMMUNIZATION COUNSELING: ICD-10-CM

## 2023-05-23 DIAGNOSIS — Z3A.28 28 WEEKS GESTATION OF PREGNANCY: ICD-10-CM

## 2023-05-23 DIAGNOSIS — O34.219 PREVIOUS CESAREAN DELIVERY, ANTEPARTUM: ICD-10-CM

## 2023-05-23 DIAGNOSIS — O26.893 PREGNANCY RELATED HIP PAIN IN THIRD TRIMESTER, ANTEPARTUM: ICD-10-CM

## 2023-05-23 DIAGNOSIS — Z23 NEED FOR TDAP VACCINATION: ICD-10-CM

## 2023-05-23 DIAGNOSIS — Z78.9 NON-SMOKER: ICD-10-CM

## 2023-05-23 DIAGNOSIS — M25.559 PREGNANCY RELATED HIP PAIN IN THIRD TRIMESTER, ANTEPARTUM: ICD-10-CM

## 2023-05-23 DIAGNOSIS — O09.529 ANTEPARTUM MULTIGRAVIDA OF ADVANCED MATERNAL AGE: ICD-10-CM

## 2023-05-23 DIAGNOSIS — Z34.83 MULTIGRAVIDA IN THIRD TRIMESTER: Primary | ICD-10-CM

## 2023-05-23 LAB
GLUCOSE UR STRIP-MCNC: NEGATIVE MG/DL
PROT UR STRIP-MCNC: NEGATIVE MG/DL

## 2023-05-23 NOTE — PROGRESS NOTES
Reason for visit: Routine OB visit at 28w4d     CC:  Patient reports good fetal movement. Concerned with sciatic nerve pain, unresolved with chiropractor care or use of maternity support belt. Denies VB, LOF, contractions, h/a, visual changes, and right upper quadrant pain.     ROS: All systems reviewed and are negative with exception of the following: Hip pain, pelvic pain, back pain    Wt 150 lb for a TWG of 9.526 kg (21 lb), /68, FHTs 148, FH 26 cm  Urine today and reviewed: negative glucose, negative protein    20-week Anatomy scan: normal; anterior placenta without evidence of placenta previa    Exam:  General Appearance:  Healthy appearing . Normal mood and behavior.  HEENT: NCAT, EOMI  HR str and reg. Lungs clear. Resp even and unlabored.  Abdomen is soft and nontender. No CVA tenderness. Uterus is consistent with EGA  Ext: no edema, nontender, no trauma, or cyanosis.    Impression  Diagnoses and all orders for this visit:    1. Multigravida in third trimester (Primary)  - Discussed third trimester of pregnancy, discomforts and measures of support, fetal movement counts,  labor warnings, preeclampsia warnings, and signs and symptoms to report. Third Trimester of Pregnancy video included in the AVS.  - Reviewed glucose tolerance test and hemoglobin results with patient.  - Discussed and encouraged to come to the hospital or call with vaginal bleeding, leaking of fluid, contractions, or other concerns.  - Return to the office in two weeks for routine OBV with Dr. Sinha and as needed with concerns.  - Patient would also desire possible TOLAC. Discussed  pelvic floor consultation with regards to perineal preservation/support during labor and plan for postpartum.   -     Ambulatory Referral to Physical Therapy Pelvic Floor    2. 28 weeks gestation of pregnancy    3. Pregnancy related hip pain in third trimester, antepartum  - Discussed measures of support for hip/sciatic nerve pain,  including heat, cool compresses, stretching, good body mechanics, continued chiropractor care, continued use of maternity support belt, OMT consultation, physical therapy referral, use of analgesic rub such as Biofreeze. Back Pain in Pregnancy education included in the AVS.   -     Ambulatory Referral to Physical Therapy Pelvic Floor    4. Immunization counseling  - Discussed Tdap immunization recommendations during pregnancy. Patient consents to receive the Tdap immunization during this clinic visit. Tdap (Tetanus, Diptheria, Pertussis) Vaccine: What You Need to Know (VIS) education included in the AVS.    5. Need for Tdap vaccination  -     Tdap Vaccine Greater Than or Equal To 8yo IM    6. Previous  delivery, antepartum  - Patient would also desire possible TOLAC. Discussed  pelvic floor consultation with regards to perineal preservation/support during labor and plan for postpartum.   -     Ambulatory Referral to Physical Therapy Pelvic Floor    7. Antepartum multigravida of advanced maternal age    8. Non-smoker          This note has been signed electronically.    Jena Ramon, DNP, APRN, CNM, RNC-OB

## 2023-05-28 PROBLEM — Z82.79 FAMILY HISTORY OF CONGENITAL HEART DEFECT: Status: ACTIVE | Noted: 2023-03-23

## 2023-06-09 ENCOUNTER — ROUTINE PRENATAL (OUTPATIENT)
Dept: OBSTETRICS AND GYNECOLOGY | Facility: CLINIC | Age: 38
End: 2023-06-09
Payer: COMMERCIAL

## 2023-06-09 VITALS — SYSTOLIC BLOOD PRESSURE: 104 MMHG | WEIGHT: 149 LBS | BODY MASS INDEX: 27.25 KG/M2 | DIASTOLIC BLOOD PRESSURE: 72 MMHG

## 2023-06-09 DIAGNOSIS — N64.59 BREAST ENGORGEMENT: ICD-10-CM

## 2023-06-09 DIAGNOSIS — O09.529 ANTEPARTUM MULTIGRAVIDA OF ADVANCED MATERNAL AGE: Primary | ICD-10-CM

## 2023-06-09 RX ORDER — BREAST PUMP
1 EACH MISCELLANEOUS AS NEEDED
Qty: 1 EACH | Refills: 0 | Status: SHIPPED | OUTPATIENT
Start: 2023-06-09

## 2023-06-09 NOTE — PROGRESS NOTES
Good fetal movement  No contractions, no reflux  Reviewed normal 3 hour Glucola and Hgb  Growth US next visit for size < dates  Tubal papers today   labor precautions    Diagnoses and all orders for this visit:    1. Antepartum multigravida of advanced maternal age (Primary)    2. Breast engorgement  -     Misc. Devices (Breast Pump) misc; 1 each As Needed (breast engorgement).  Dispense: 1 each; Refill: 0

## 2023-07-28 ENCOUNTER — ROUTINE PRENATAL (OUTPATIENT)
Dept: OBSTETRICS AND GYNECOLOGY | Facility: CLINIC | Age: 38
End: 2023-07-28
Payer: COMMERCIAL

## 2023-07-28 VITALS — BODY MASS INDEX: 30.18 KG/M2 | DIASTOLIC BLOOD PRESSURE: 64 MMHG | SYSTOLIC BLOOD PRESSURE: 112 MMHG | WEIGHT: 165 LBS

## 2023-07-28 DIAGNOSIS — O34.219 PREVIOUS CESAREAN DELIVERY, ANTEPARTUM: Primary | ICD-10-CM

## 2023-07-28 DIAGNOSIS — O09.529 ANTEPARTUM MULTIGRAVIDA OF ADVANCED MATERNAL AGE: ICD-10-CM

## 2023-07-28 NOTE — PROGRESS NOTES
Good fetal movement  No contractions  Cervix 1cm, head still high  Reviewed GBS positive  Labor instructions    Diagnoses and all orders for this visit:    1. Previous  delivery, antepartum (Primary)    2. Antepartum multigravida of advanced maternal age

## 2023-08-03 ENCOUNTER — ROUTINE PRENATAL (OUTPATIENT)
Dept: OBSTETRICS AND GYNECOLOGY | Facility: CLINIC | Age: 38
End: 2023-08-03
Payer: COMMERCIAL

## 2023-08-03 VITALS — DIASTOLIC BLOOD PRESSURE: 80 MMHG | BODY MASS INDEX: 30.18 KG/M2 | WEIGHT: 165 LBS | SYSTOLIC BLOOD PRESSURE: 118 MMHG

## 2023-08-03 DIAGNOSIS — Z34.83 MULTIGRAVIDA IN THIRD TRIMESTER: ICD-10-CM

## 2023-08-03 DIAGNOSIS — O34.219 PREVIOUS CESAREAN DELIVERY, ANTEPARTUM: Primary | ICD-10-CM

## 2023-08-08 ENCOUNTER — ROUTINE PRENATAL (OUTPATIENT)
Dept: OBSTETRICS AND GYNECOLOGY | Facility: CLINIC | Age: 38
End: 2023-08-08
Payer: COMMERCIAL

## 2023-08-08 VITALS — BODY MASS INDEX: 30.73 KG/M2 | WEIGHT: 168 LBS | DIASTOLIC BLOOD PRESSURE: 78 MMHG | SYSTOLIC BLOOD PRESSURE: 112 MMHG

## 2023-08-08 DIAGNOSIS — O34.219 PREVIOUS CESAREAN DELIVERY, ANTEPARTUM: Primary | ICD-10-CM

## 2023-08-08 DIAGNOSIS — O09.529 ANTEPARTUM MULTIGRAVIDA OF ADVANCED MATERNAL AGE: ICD-10-CM

## 2023-08-08 NOTE — PROGRESS NOTES
Good fetal movement  Has had some contractions  Reviewed GBS positive  Labor instructions, still desires TOLAC, discussed plan if labor doesn't happen    Diagnoses and all orders for this visit:    1. Previous  delivery, antepartum (Primary)    2. Antepartum multigravida of advanced maternal age

## 2023-08-12 ENCOUNTER — ANESTHESIA EVENT (OUTPATIENT)
Dept: LABOR AND DELIVERY | Facility: HOSPITAL | Age: 38
End: 2023-08-12
Payer: COMMERCIAL

## 2023-08-12 ENCOUNTER — ANESTHESIA (OUTPATIENT)
Dept: LABOR AND DELIVERY | Facility: HOSPITAL | Age: 38
End: 2023-08-12
Payer: COMMERCIAL

## 2023-08-12 ENCOUNTER — HOSPITAL ENCOUNTER (INPATIENT)
Facility: HOSPITAL | Age: 38
LOS: 2 days | Discharge: HOME | End: 2023-08-14
Attending: OBSTETRICS & GYNECOLOGY | Admitting: OBSTETRICS & GYNECOLOGY
Payer: COMMERCIAL

## 2023-08-12 DIAGNOSIS — O34.219 PREVIOUS CESAREAN DELIVERY, ANTEPARTUM: Primary | ICD-10-CM

## 2023-08-12 PROBLEM — Z37.9 NORMAL LABOR: Status: ACTIVE | Noted: 2023-08-12

## 2023-08-12 PROBLEM — Z37.9 NORMAL LABOR: Status: RESOLVED | Noted: 2023-08-12 | Resolved: 2023-08-12

## 2023-08-12 LAB
ABO GROUP BLD: NORMAL
BASOPHILS # BLD AUTO: 0.05 10*3/MM3 (ref 0–0.2)
BASOPHILS NFR BLD AUTO: 0.4 % (ref 0–1.5)
BLD GP AB SCN SERPL QL: NEGATIVE
DEPRECATED RDW RBC AUTO: 45.4 FL (ref 37–54)
EOSINOPHIL # BLD AUTO: 0.07 10*3/MM3 (ref 0–0.4)
EOSINOPHIL NFR BLD AUTO: 0.6 % (ref 0.3–6.2)
ERYTHROCYTE [DISTWIDTH] IN BLOOD BY AUTOMATED COUNT: 14.1 % (ref 12.3–15.4)
HCT VFR BLD AUTO: 39.5 % (ref 34–46.6)
HGB BLD-MCNC: 12.7 G/DL (ref 12–15.9)
IMM GRANULOCYTES # BLD AUTO: 0.1 10*3/MM3 (ref 0–0.05)
IMM GRANULOCYTES NFR BLD AUTO: 0.9 % (ref 0–0.5)
LYMPHOCYTES # BLD AUTO: 2.04 10*3/MM3 (ref 0.7–3.1)
LYMPHOCYTES NFR BLD AUTO: 17.6 % (ref 19.6–45.3)
MCH RBC QN AUTO: 28.7 PG (ref 26.6–33)
MCHC RBC AUTO-ENTMCNC: 32.2 G/DL (ref 31.5–35.7)
MCV RBC AUTO: 89.4 FL (ref 79–97)
MONOCYTES # BLD AUTO: 0.76 10*3/MM3 (ref 0.1–0.9)
MONOCYTES NFR BLD AUTO: 6.6 % (ref 5–12)
NEUTROPHILS NFR BLD AUTO: 73.9 % (ref 42.7–76)
NEUTROPHILS NFR BLD AUTO: 8.58 10*3/MM3 (ref 1.7–7)
NRBC BLD AUTO-RTO: 0 /100 WBC (ref 0–0.2)
PLATELET # BLD AUTO: 217 10*3/MM3 (ref 140–450)
PMV BLD AUTO: 11.2 FL (ref 6–12)
RBC # BLD AUTO: 4.42 10*6/MM3 (ref 3.77–5.28)
RH BLD: POSITIVE
T&S EXPIRATION DATE: NORMAL
WBC NRBC COR # BLD: 11.6 10*3/MM3 (ref 3.4–10.8)

## 2023-08-12 PROCEDURE — 58611 LIGATE OVIDUCT(S) ADD-ON: CPT | Performed by: OBSTETRICS & GYNECOLOGY

## 2023-08-12 PROCEDURE — 0UB70ZZ EXCISION OF BILATERAL FALLOPIAN TUBES, OPEN APPROACH: ICD-10-PCS | Performed by: OBSTETRICS & GYNECOLOGY

## 2023-08-12 PROCEDURE — 25010000002 MORPHINE PER 10 MG: Performed by: OBSTETRICS & GYNECOLOGY

## 2023-08-12 PROCEDURE — 94799 UNLISTED PULMONARY SVC/PX: CPT

## 2023-08-12 PROCEDURE — 25010000002 OXYTOCIN PER 10 UNITS: Performed by: NURSE ANESTHETIST, CERTIFIED REGISTERED

## 2023-08-12 PROCEDURE — 25010000002 NALOXONE PER 1 MG: Performed by: NURSE ANESTHETIST, CERTIFIED REGISTERED

## 2023-08-12 PROCEDURE — 25010000002 HYDROMORPHONE 1 MG/ML SOLUTION: Performed by: NURSE ANESTHETIST, CERTIFIED REGISTERED

## 2023-08-12 PROCEDURE — 25010000002 ONDANSETRON PER 1 MG: Performed by: NURSE ANESTHETIST, CERTIFIED REGISTERED

## 2023-08-12 PROCEDURE — 85025 COMPLETE CBC W/AUTO DIFF WBC: CPT | Performed by: OBSTETRICS & GYNECOLOGY

## 2023-08-12 PROCEDURE — 86850 RBC ANTIBODY SCREEN: CPT | Performed by: OBSTETRICS & GYNECOLOGY

## 2023-08-12 PROCEDURE — 25010000002 NALBUPHINE PER 10 MG: Performed by: NURSE ANESTHETIST, CERTIFIED REGISTERED

## 2023-08-12 PROCEDURE — 86900 BLOOD TYPING SEROLOGIC ABO: CPT | Performed by: OBSTETRICS & GYNECOLOGY

## 2023-08-12 PROCEDURE — 25010000002 CEFAZOLIN PER 500 MG: Performed by: OBSTETRICS & GYNECOLOGY

## 2023-08-12 PROCEDURE — 25010000002 PENICILLIN G POTASSIUM PER 600000 UNITS: Performed by: OBSTETRICS & GYNECOLOGY

## 2023-08-12 PROCEDURE — 86901 BLOOD TYPING SEROLOGIC RH(D): CPT | Performed by: OBSTETRICS & GYNECOLOGY

## 2023-08-12 PROCEDURE — 88302 TISSUE EXAM BY PATHOLOGIST: CPT | Performed by: OBSTETRICS & GYNECOLOGY

## 2023-08-12 PROCEDURE — 25010000002 KETOROLAC TROMETHAMINE PER 15 MG: Performed by: NURSE ANESTHETIST, CERTIFIED REGISTERED

## 2023-08-12 PROCEDURE — 25010000002 AZITHROMYCIN PER 500 MG: Performed by: OBSTETRICS & GYNECOLOGY

## 2023-08-12 PROCEDURE — 51702 INSERT TEMP BLADDER CATH: CPT

## 2023-08-12 PROCEDURE — 25010000002 DEXAMETHASONE PER 1 MG: Performed by: NURSE ANESTHETIST, CERTIFIED REGISTERED

## 2023-08-12 PROCEDURE — 59515 CESAREAN DELIVERY: CPT | Performed by: OBSTETRICS & GYNECOLOGY

## 2023-08-12 PROCEDURE — 25010000002 METOCLOPRAMIDE PER 10 MG: Performed by: NURSE ANESTHETIST, CERTIFIED REGISTERED

## 2023-08-12 PROCEDURE — 25010000002 KETOROLAC TROMETHAMINE PER 15 MG: Performed by: OBSTETRICS & GYNECOLOGY

## 2023-08-12 RX ORDER — SODIUM CHLORIDE, SODIUM LACTATE, POTASSIUM CHLORIDE, CALCIUM CHLORIDE 600; 310; 30; 20 MG/100ML; MG/100ML; MG/100ML; MG/100ML
125 INJECTION, SOLUTION INTRAVENOUS CONTINUOUS
Status: DISCONTINUED | OUTPATIENT
Start: 2023-08-12 | End: 2023-08-12

## 2023-08-12 RX ORDER — ONDANSETRON 4 MG/1
4 TABLET, FILM COATED ORAL EVERY 8 HOURS PRN
Status: DISCONTINUED | OUTPATIENT
Start: 2023-08-12 | End: 2023-08-14 | Stop reason: HOSPADM

## 2023-08-12 RX ORDER — OXYTOCIN/0.9 % SODIUM CHLORIDE 30/500 ML
250 PLASTIC BAG, INJECTION (ML) INTRAVENOUS CONTINUOUS
Status: ACTIVE | OUTPATIENT
Start: 2023-08-12 | End: 2023-08-12

## 2023-08-12 RX ORDER — TERBUTALINE SULFATE 1 MG/ML
0.25 INJECTION, SOLUTION SUBCUTANEOUS AS NEEDED
Status: DISCONTINUED | OUTPATIENT
Start: 2023-08-12 | End: 2023-08-12

## 2023-08-12 RX ORDER — ONDANSETRON 2 MG/ML
4 INJECTION INTRAMUSCULAR; INTRAVENOUS EVERY 6 HOURS PRN
Status: DISCONTINUED | OUTPATIENT
Start: 2023-08-12 | End: 2023-08-12 | Stop reason: HOSPADM

## 2023-08-12 RX ORDER — ACETAMINOPHEN 325 MG/1
650 TABLET ORAL EVERY 4 HOURS PRN
Status: DISCONTINUED | OUTPATIENT
Start: 2023-08-12 | End: 2023-08-12 | Stop reason: HOSPADM

## 2023-08-12 RX ORDER — DOCUSATE SODIUM 100 MG/1
100 CAPSULE, LIQUID FILLED ORAL 2 TIMES DAILY PRN
Status: DISCONTINUED | OUTPATIENT
Start: 2023-08-12 | End: 2023-08-14 | Stop reason: HOSPADM

## 2023-08-12 RX ORDER — DEXAMETHASONE SODIUM PHOSPHATE 4 MG/ML
INJECTION, SOLUTION INTRA-ARTICULAR; INTRALESIONAL; INTRAMUSCULAR; INTRAVENOUS; SOFT TISSUE AS NEEDED
Status: DISCONTINUED | OUTPATIENT
Start: 2023-08-12 | End: 2023-08-12 | Stop reason: SURG

## 2023-08-12 RX ORDER — FAMOTIDINE 10 MG/ML
20 INJECTION, SOLUTION INTRAVENOUS ONCE AS NEEDED
Status: COMPLETED | OUTPATIENT
Start: 2023-08-12 | End: 2023-08-12

## 2023-08-12 RX ORDER — ONDANSETRON 2 MG/ML
4 INJECTION INTRAMUSCULAR; INTRAVENOUS EVERY 6 HOURS PRN
Status: DISCONTINUED | OUTPATIENT
Start: 2023-08-12 | End: 2023-08-14 | Stop reason: HOSPADM

## 2023-08-12 RX ORDER — FAMOTIDINE 20 MG/1
20 TABLET, FILM COATED ORAL ONCE AS NEEDED
Status: DISCONTINUED | OUTPATIENT
Start: 2023-08-12 | End: 2023-08-12 | Stop reason: HOSPADM

## 2023-08-12 RX ORDER — OXYTOCIN 10 [USP'U]/ML
INJECTION, SOLUTION INTRAMUSCULAR; INTRAVENOUS AS NEEDED
Status: DISCONTINUED | OUTPATIENT
Start: 2023-08-12 | End: 2023-08-12 | Stop reason: SURG

## 2023-08-12 RX ORDER — EPHEDRINE SULFATE 50 MG/ML
INJECTION, SOLUTION INTRAVENOUS AS NEEDED
Status: DISCONTINUED | OUTPATIENT
Start: 2023-08-12 | End: 2023-08-12 | Stop reason: SURG

## 2023-08-12 RX ORDER — KETOROLAC TROMETHAMINE 30 MG/ML
INJECTION, SOLUTION INTRAMUSCULAR; INTRAVENOUS AS NEEDED
Status: DISCONTINUED | OUTPATIENT
Start: 2023-08-12 | End: 2023-08-12 | Stop reason: SURG

## 2023-08-12 RX ORDER — ACETAMINOPHEN 325 MG/1
650 TABLET ORAL EVERY 6 HOURS
Status: DISCONTINUED | OUTPATIENT
Start: 2023-08-13 | End: 2023-08-14 | Stop reason: HOSPADM

## 2023-08-12 RX ORDER — ACETAMINOPHEN 500 MG
1000 TABLET ORAL EVERY 6 HOURS
Status: DISPENSED | OUTPATIENT
Start: 2023-08-12 | End: 2023-08-13

## 2023-08-12 RX ORDER — HYDROMORPHONE HYDROCHLORIDE 1 MG/ML
0.5 INJECTION, SOLUTION INTRAMUSCULAR; INTRAVENOUS; SUBCUTANEOUS
Status: DISCONTINUED | OUTPATIENT
Start: 2023-08-12 | End: 2023-08-14 | Stop reason: HOSPADM

## 2023-08-12 RX ORDER — NALBUPHINE HYDROCHLORIDE 10 MG/ML
2.5 INJECTION, SOLUTION INTRAMUSCULAR; INTRAVENOUS; SUBCUTANEOUS EVERY 4 HOURS PRN
Status: DISPENSED | OUTPATIENT
Start: 2023-08-12 | End: 2023-08-13

## 2023-08-12 RX ORDER — METOCLOPRAMIDE HYDROCHLORIDE 5 MG/ML
10 INJECTION INTRAMUSCULAR; INTRAVENOUS ONCE
Status: COMPLETED | OUTPATIENT
Start: 2023-08-12 | End: 2023-08-12

## 2023-08-12 RX ORDER — MISOPROSTOL 200 UG/1
800 TABLET ORAL AS NEEDED
Status: DISCONTINUED | OUTPATIENT
Start: 2023-08-12 | End: 2023-08-14 | Stop reason: HOSPADM

## 2023-08-12 RX ORDER — NALOXONE HCL 0.4 MG/ML
0.4 VIAL (ML) INJECTION
Status: DISCONTINUED | OUTPATIENT
Start: 2023-08-12 | End: 2023-08-12 | Stop reason: HOSPADM

## 2023-08-12 RX ORDER — KETOROLAC TROMETHAMINE 30 MG/ML
30 INJECTION, SOLUTION INTRAMUSCULAR; INTRAVENOUS ONCE
Status: DISCONTINUED | OUTPATIENT
Start: 2023-08-12 | End: 2023-08-12

## 2023-08-12 RX ORDER — MORPHINE SULFATE 10 MG/ML
10 INJECTION INTRAMUSCULAR; INTRAVENOUS; SUBCUTANEOUS
Status: DISCONTINUED | OUTPATIENT
Start: 2023-08-12 | End: 2023-08-12

## 2023-08-12 RX ORDER — ONDANSETRON 2 MG/ML
INJECTION INTRAMUSCULAR; INTRAVENOUS AS NEEDED
Status: DISCONTINUED | OUTPATIENT
Start: 2023-08-12 | End: 2023-08-12 | Stop reason: SURG

## 2023-08-12 RX ORDER — NALOXONE HCL 0.4 MG/ML
0.2 VIAL (ML) INJECTION
Status: DISCONTINUED | OUTPATIENT
Start: 2023-08-12 | End: 2023-08-14 | Stop reason: HOSPADM

## 2023-08-12 RX ORDER — ACETAMINOPHEN 500 MG
1000 TABLET ORAL ONCE
Status: COMPLETED | OUTPATIENT
Start: 2023-08-12 | End: 2023-08-12

## 2023-08-12 RX ORDER — ONDANSETRON 4 MG/1
4 TABLET, FILM COATED ORAL EVERY 6 HOURS PRN
Status: DISCONTINUED | OUTPATIENT
Start: 2023-08-12 | End: 2023-08-12 | Stop reason: HOSPADM

## 2023-08-12 RX ORDER — HYDROMORPHONE HYDROCHLORIDE 1 MG/ML
0.5 INJECTION, SOLUTION INTRAMUSCULAR; INTRAVENOUS; SUBCUTANEOUS
Status: DISCONTINUED | OUTPATIENT
Start: 2023-08-12 | End: 2023-08-12 | Stop reason: HOSPADM

## 2023-08-12 RX ORDER — CARBOPROST TROMETHAMINE 250 UG/ML
250 INJECTION, SOLUTION INTRAMUSCULAR AS NEEDED
Status: DISCONTINUED | OUTPATIENT
Start: 2023-08-12 | End: 2023-08-14 | Stop reason: HOSPADM

## 2023-08-12 RX ORDER — DROPERIDOL 2.5 MG/ML
0.62 INJECTION, SOLUTION INTRAMUSCULAR; INTRAVENOUS ONCE AS NEEDED
Status: DISCONTINUED | OUTPATIENT
Start: 2023-08-12 | End: 2023-08-14 | Stop reason: HOSPADM

## 2023-08-12 RX ORDER — OXYCODONE HYDROCHLORIDE 5 MG/1
10 TABLET ORAL EVERY 4 HOURS PRN
Status: DISCONTINUED | OUTPATIENT
Start: 2023-08-12 | End: 2023-08-14 | Stop reason: HOSPADM

## 2023-08-12 RX ORDER — OXYTOCIN/0.9 % SODIUM CHLORIDE 30/500 ML
999 PLASTIC BAG, INJECTION (ML) INTRAVENOUS ONCE
Status: DISCONTINUED | OUTPATIENT
Start: 2023-08-12 | End: 2023-08-14 | Stop reason: HOSPADM

## 2023-08-12 RX ORDER — PENICILLIN G 3000000 [IU]/50ML
3 INJECTION, SOLUTION INTRAVENOUS EVERY 4 HOURS
Status: DISCONTINUED | OUTPATIENT
Start: 2023-08-12 | End: 2023-08-12

## 2023-08-12 RX ORDER — NALOXONE HCL 0.4 MG/ML
0.4 VIAL (ML) INJECTION
Status: ACTIVE | OUTPATIENT
Start: 2023-08-12 | End: 2023-08-13

## 2023-08-12 RX ORDER — METHYLERGONOVINE MALEATE 0.2 MG/ML
200 INJECTION INTRAVENOUS AS NEEDED
Status: DISCONTINUED | OUTPATIENT
Start: 2023-08-12 | End: 2023-08-14 | Stop reason: HOSPADM

## 2023-08-12 RX ORDER — FAMOTIDINE 10 MG/ML
20 INJECTION, SOLUTION INTRAVENOUS ONCE AS NEEDED
Status: DISCONTINUED | OUTPATIENT
Start: 2023-08-12 | End: 2023-08-12 | Stop reason: HOSPADM

## 2023-08-12 RX ORDER — SIMETHICONE 80 MG
80 TABLET,CHEWABLE ORAL 4 TIMES DAILY PRN
Status: DISCONTINUED | OUTPATIENT
Start: 2023-08-12 | End: 2023-08-14 | Stop reason: HOSPADM

## 2023-08-12 RX ORDER — METHYLERGONOVINE MALEATE 0.2 MG/ML
200 INJECTION INTRAVENOUS ONCE AS NEEDED
Status: DISCONTINUED | OUTPATIENT
Start: 2023-08-12 | End: 2023-08-14 | Stop reason: HOSPADM

## 2023-08-12 RX ORDER — NALOXONE HCL 0.4 MG/ML
0.1 VIAL (ML) INJECTION
Status: DISCONTINUED | OUTPATIENT
Start: 2023-08-12 | End: 2023-08-14 | Stop reason: HOSPADM

## 2023-08-12 RX ORDER — IBUPROFEN 600 MG/1
600 TABLET ORAL EVERY 6 HOURS
Status: DISCONTINUED | OUTPATIENT
Start: 2023-08-13 | End: 2023-08-14 | Stop reason: HOSPADM

## 2023-08-12 RX ORDER — PRENATAL VIT/IRON FUM/FOLIC AC 27MG-0.8MG
1 TABLET ORAL DAILY
Status: DISCONTINUED | OUTPATIENT
Start: 2023-08-12 | End: 2023-08-14 | Stop reason: HOSPADM

## 2023-08-12 RX ORDER — MISOPROSTOL 200 UG/1
800 TABLET ORAL ONCE AS NEEDED
Status: DISCONTINUED | OUTPATIENT
Start: 2023-08-12 | End: 2023-08-14 | Stop reason: HOSPADM

## 2023-08-12 RX ORDER — MISOPROSTOL 200 UG/1
800 TABLET ORAL ONCE AS NEEDED
Status: DISCONTINUED | OUTPATIENT
Start: 2023-08-12 | End: 2023-08-12 | Stop reason: HOSPADM

## 2023-08-12 RX ORDER — CITRIC ACID/SODIUM CITRATE 334-500MG
15 SOLUTION, ORAL ORAL ONCE
Status: COMPLETED | OUTPATIENT
Start: 2023-08-12 | End: 2023-08-12

## 2023-08-12 RX ORDER — ONDANSETRON 2 MG/ML
4 INJECTION INTRAMUSCULAR; INTRAVENOUS EVERY 6 HOURS PRN
Status: DISCONTINUED | OUTPATIENT
Start: 2023-08-12 | End: 2023-08-12 | Stop reason: SDUPTHER

## 2023-08-12 RX ORDER — CARBOPROST TROMETHAMINE 250 UG/ML
250 INJECTION, SOLUTION INTRAMUSCULAR AS NEEDED
Status: DISCONTINUED | OUTPATIENT
Start: 2023-08-12 | End: 2023-08-12 | Stop reason: HOSPADM

## 2023-08-12 RX ORDER — CITRIC ACID/SODIUM CITRATE 334-500MG
30 SOLUTION, ORAL ORAL ONCE AS NEEDED
Status: DISCONTINUED | OUTPATIENT
Start: 2023-08-12 | End: 2023-08-12

## 2023-08-12 RX ORDER — OXYCODONE HYDROCHLORIDE 5 MG/1
5 TABLET ORAL EVERY 4 HOURS PRN
Status: DISCONTINUED | OUTPATIENT
Start: 2023-08-12 | End: 2023-08-14 | Stop reason: HOSPADM

## 2023-08-12 RX ORDER — CARBOPROST TROMETHAMINE 250 UG/ML
250 INJECTION, SOLUTION INTRAMUSCULAR ONCE AS NEEDED
Status: DISCONTINUED | OUTPATIENT
Start: 2023-08-12 | End: 2023-08-14 | Stop reason: HOSPADM

## 2023-08-12 RX ORDER — KETOROLAC TROMETHAMINE 15 MG/ML
15 INJECTION, SOLUTION INTRAMUSCULAR; INTRAVENOUS EVERY 6 HOURS
Status: DISPENSED | OUTPATIENT
Start: 2023-08-12 | End: 2023-08-13

## 2023-08-12 RX ORDER — OXYTOCIN/0.9 % SODIUM CHLORIDE 30/500 ML
999 PLASTIC BAG, INJECTION (ML) INTRAVENOUS ONCE
Status: DISCONTINUED | OUTPATIENT
Start: 2023-08-12 | End: 2023-08-12 | Stop reason: HOSPADM

## 2023-08-12 RX ORDER — ONDANSETRON 2 MG/ML
4 INJECTION INTRAMUSCULAR; INTRAVENOUS ONCE AS NEEDED
Status: DISCONTINUED | OUTPATIENT
Start: 2023-08-12 | End: 2023-08-12 | Stop reason: HOSPADM

## 2023-08-12 RX ORDER — MORPHINE SULFATE 2 MG/ML
2 INJECTION, SOLUTION INTRAMUSCULAR; INTRAVENOUS
Status: DISCONTINUED | OUTPATIENT
Start: 2023-08-12 | End: 2023-08-12

## 2023-08-12 RX ORDER — METHYLERGONOVINE MALEATE 0.2 MG/ML
200 INJECTION INTRAVENOUS ONCE AS NEEDED
Status: DISCONTINUED | OUTPATIENT
Start: 2023-08-12 | End: 2023-08-12 | Stop reason: HOSPADM

## 2023-08-12 RX ADMIN — METOCLOPRAMIDE HYDROCHLORIDE 10 MG: 5 INJECTION INTRAMUSCULAR; INTRAVENOUS at 09:53

## 2023-08-12 RX ADMIN — OXYTOCIN 20 UNITS: 10 INJECTION INTRAVENOUS at 10:41

## 2023-08-12 RX ADMIN — SODIUM CHLORIDE, POTASSIUM CHLORIDE, SODIUM LACTATE AND CALCIUM CHLORIDE 125 ML/HR: 600; 310; 30; 20 INJECTION, SOLUTION INTRAVENOUS at 05:20

## 2023-08-12 RX ADMIN — EPHEDRINE SULFATE 20 MG: 50 INJECTION, SOLUTION INTRAVENOUS at 10:34

## 2023-08-12 RX ADMIN — HYDROMORPHONE HYDROCHLORIDE 100 MCG: 1 INJECTION, SOLUTION INTRAMUSCULAR; INTRAVENOUS; SUBCUTANEOUS at 10:22

## 2023-08-12 RX ADMIN — CEFAZOLIN 2 G: 2 INJECTION, POWDER, FOR SOLUTION INTRAMUSCULAR; INTRAVENOUS at 10:28

## 2023-08-12 RX ADMIN — MORPHINE SULFATE 2 MG: 2 INJECTION, SOLUTION INTRAMUSCULAR; INTRAVENOUS at 05:39

## 2023-08-12 RX ADMIN — ONDANSETRON 4 MG: 2 INJECTION INTRAMUSCULAR; INTRAVENOUS at 10:25

## 2023-08-12 RX ADMIN — KETOROLAC TROMETHAMINE 15 MG: 15 INJECTION, SOLUTION INTRAMUSCULAR; INTRAVENOUS at 17:21

## 2023-08-12 RX ADMIN — ACETAMINOPHEN 1000 MG: 500 TABLET, FILM COATED ORAL at 14:46

## 2023-08-12 RX ADMIN — MORPHINE SULFATE 2 MG: 2 INJECTION, SOLUTION INTRAMUSCULAR; INTRAVENOUS at 07:48

## 2023-08-12 RX ADMIN — NALOXONE HYDROCHLORIDE 0.2 MG: 0.4 INJECTION, SOLUTION INTRAMUSCULAR; INTRAVENOUS; SUBCUTANEOUS at 14:32

## 2023-08-12 RX ADMIN — DEXAMETHASONE SODIUM PHOSPHATE 4 MG: 4 INJECTION, SOLUTION INTRAMUSCULAR; INTRAVENOUS at 10:31

## 2023-08-12 RX ADMIN — SODIUM CHLORIDE, POTASSIUM CHLORIDE, SODIUM LACTATE AND CALCIUM CHLORIDE 1000 ML: 600; 310; 30; 20 INJECTION, SOLUTION INTRAVENOUS at 12:07

## 2023-08-12 RX ADMIN — NALBUPHINE HYDROCHLORIDE 2.5 MG: 10 INJECTION, SOLUTION INTRAMUSCULAR; INTRAVENOUS; SUBCUTANEOUS at 11:54

## 2023-08-12 RX ADMIN — SODIUM CITRATE AND CITRIC ACID MONOHYDRATE 15 ML: 500; 334 SOLUTION ORAL at 09:54

## 2023-08-12 RX ADMIN — DEXAMETHASONE SODIUM PHOSPHATE 4 MG: 4 INJECTION, SOLUTION INTRAMUSCULAR; INTRAVENOUS at 10:50

## 2023-08-12 RX ADMIN — ACETAMINOPHEN 1000 MG: 500 TABLET, FILM COATED ORAL at 09:24

## 2023-08-12 RX ADMIN — HYDROMORPHONE HYDROCHLORIDE 900 MCG: 1 INJECTION, SOLUTION INTRAMUSCULAR; INTRAVENOUS; SUBCUTANEOUS at 10:59

## 2023-08-12 RX ADMIN — ONDANSETRON 4 MG: 2 INJECTION INTRAMUSCULAR; INTRAVENOUS at 10:36

## 2023-08-12 RX ADMIN — FAMOTIDINE 20 MG: 10 INJECTION INTRAVENOUS at 09:53

## 2023-08-12 RX ADMIN — AZITHROMYCIN DIHYDRATE 500 MG: 500 INJECTION, POWDER, LYOPHILIZED, FOR SOLUTION INTRAVENOUS at 09:25

## 2023-08-12 RX ADMIN — KETOROLAC TROMETHAMINE 30 MG: 30 INJECTION, SOLUTION INTRAMUSCULAR; INTRAVENOUS at 11:19

## 2023-08-12 RX ADMIN — SODIUM CHLORIDE 5 MILLION UNITS: 900 INJECTION INTRAVENOUS at 05:38

## 2023-08-12 NOTE — LACTATION NOTE
Mother's Name: Luh  Phone #:[phone]  Infant Name: Theo  :2022  Gestation:40w1d  Day of life:0  Birth weight:  7-11.1 (3490g) Discharge weight:  Weight Loss:   24 hour Summary of Feeds:  Voids:  Stools:  Assistive devices (shields, shells, etc):  Significant Maternal history: , AMA,  1st child for 6 months and supplemented  Maternal Concerns:  denies  Maternal Goal: breastfeed and formula feed for 6 months  Mother's Medications: PNV, Fiber, Probiotic  Breastpump for home: yes Medela Maxflow  Ped follow up appt:[place]    Mother has independently  since delivery. Visit with mother, questions and concerns denied at this time. Initial breastfeeding packet and youtube video list given and reviewed. Breastfeeding book provided. Encourage on demand feedings or waking infant every 3 hours to offer. If formula provided in place of bfing, mother should pump. Reassured mother hospital grade pump may be provided to her for use while in hospital if she desires to pump/supplement. Questions and concerns denied at this time. Encouragement and support provided.     Instructed mom our lactation team is here for continued support throughout their breastfeeding journey. Our team has encouraged mom to call with any questions or concerns that may arise after discharge.    Breastfeeding and Diaper Chart  Check List for Essentials of Positioning And Latch-on handout provided by Lactation Education Resources  Hand Expression handout provided by Lactation Education Resources  Five Keys to Successful Breastfeeding handout provided by Lactation Education Resources    The Many Benefits if Breastfeeding handout given  Breastfeeding saves time  *Breastfeeding allows you to calm or feed your baby immediately, which leads to a happier baby who cries less  *There is nothing to buy, prepare, or maintain.There is nothing to clean or sterilize.  Breastfeeding builds a mothers confidence  *She knows all her baby  needs to thrive is her!  Breastfeeding saves Money  *There is no formula to buy and healthier breast fed babies have less medical costs  Healthy Mom/Healthy baby  * babies get sick less often, and when they do they are usually sick less severely and for a shorter time  * babies have fewer ear infections  * babies have fewer allergies  *Mothers who breastfeed have a lower risk for cancer, osteoporosis, anemia, high blood pressure, obesity, and Type ll diabetes  *Mothers miss less work days with sick babies  Breast fed babies have a better dental health  * babies have better jaw development which requires lest orthodontic work  *Breast milk does not promote cavities  * babies can nurse at night without worry of tooth decay  Breastfeeding allows a baby to reach his full IQ potential  *The longer a baby is breast fed, the better their brain development  Breast fed babies and moms are more relaxed  *The hormones released during breastfeeding have a calming effect on mothers  *Breastfeeding requires mom to take a break; this may help mom get more rest after delivery  *Breastfeeding is quicker than preparing formula which allows mom and baby to get back to sleep faster  *Breastfeeding promotes bonding and allows mom to learn babies cues and care needs more quickly  Breastfeeding cleanup is easier  *The bowel movements and spit up of breast fed babies doesn't smell as bad  *Spit-up of breast fed babies doesn't stain clothing  Getting out of the hourse is easier  *No formula bottles to prepare and carry safely   *No time restraints due to worry about what baby will eat  *No worries about warming a bottle or finding safe water to prepare bottles  Breastfeeding mother get their bodies back sooner  *The uterus shrinks more quickly and completely, which allows a flatter tummy  *Breastfeeding burns 400-500 calories a day; making milk torches stored fat!  Breastfeeding is better for the  environment  *There is no trash to dispose of after breastfeeding  *There is no production facility to produce breast milk; moms body does it all without the pollution of a factory      Your Guide to Breastfeeding Booklet by [employer], www.EdRover      Safe Storage of Breastmilk magnet: [employer]      Educational Breastfeeding Videos on   YouTube  (length of video in minutes)    Expressing the First Milk - Small Baby Series (7:19)  Hand Expression [facility] (7:34)  Attaching Your Baby at the Breast - Breastfeeding Series (10:26)  The Power of Pumping - [facility]   Maximizing Production [facility] (9:35)  Instructions for use Medela Symphony breastpump (English) (1:58)  Medela 2-Phase Expression (4:05)  Medela double pumping video (2:19)  Choosing your PersonalFit breast shield size (3:04)  We also recommend visiting www.mySkin for valuable education and videos on breastfeeding full term AND  infants. This is a great resource to begin learning about breastfeeding during pregnancy as well.                [facility]             [phone]

## 2023-08-12 NOTE — OP NOTE
[facility]  Luh Tapia  : 1985  MRN: [id number]  [facility]: [id number]  Date: 2023    Operative Note        Pre-op Diagnosis:  Previous  delivery, antepartum [O34.219]  Undesired fertility   Post-op Diagnosis:  Post-Op Diagnosis Codes:     * Previous  delivery, antepartum [O34.219]  Undesired fertility   Procedure: Procedure(s):   SECTION REPEAT WITH bilateral tubal ligation   Surgeon: Surgeon(s):  Aakash Sinha MD       Anesthesia: Spinal     Estimated Blood Loss: 700   mLs   Fluids: 500   mLs   UOP: 75   mLs   Drains: Silverman catheter   ABx: Kefzol, azithromycin     Specimens:  Bilateral fallopian tube segments   Findings: Normal uterus, tubes, and ovaries.    Complications: None       INDICATION: Luh Tapia is a 37 y.o. female who presented at 40 weeks gestational age with painful contractions.  Originally she was considering a trial of labor, but decided that she would rather proceed with repeat .  She has previously expressed desire for surgical sterilization.     PROCEDURE: After informed consent was obtained, the patient was taken to the operating room where spinal anesthesia was administered. Time out procedure was completed and perioperative antibiotics were administered. She was placed in the supine position with leftward tilt and her abdomen was prepped and draped in normal sterile fashion after a Silverman catheter was placed by nursing staff.   After confirming adequate anesthesia, a Pfannenstiel incision was made with a scalpel 2 fingerbreadths above the pubic symphysis.  This was carried down sharply to the underlying fascia which was incised in the midline.  The fascial incision was extended laterally with Gutierrez scissors.  The fascial edges were then elevated and the underlying rectus muscles dissected off with sharp technique.  The rectus muscles were sharply  in the midline and the underlying peritoneum identified and entered sharply.   The peritoneal incision was then extended and an Phoenix-O retractor inserted, taking care to avoid entrapping the bowel.  A low transverse uterine incision was made with a clean scalpel.  The uterine incision was bluntly extended and amniotomy was performed returning clear fluid.  The head of the infant was delivered through the incision without difficulty and the remainder of the infant delivered with fundal pressure.  The infant was vigorous on the field, the cord was clamped and cut, and the infant handed off to waiting nursery nurse.  The placenta was then expressed.  The uterus was repaired in situ and cleared of clot and debris with a moist laparotomy sponge.  The uterine incision was closed with a running locked suture of 0 Monocryl.  A second imbricating layer of 0 Monocryl was placed for reinforcement.  The uterine incision was hemostatic.    Attention was turned to the tubal ligation portion of the procedure.  Each fallopian tube was identified and followed out to its fimbriated end to ensure correct identification.  A portion in the isthmic region of the left tube was grasped with a Concrete clamp and elevated.  A window was then made in the mesosalpinx with Bovie cautery.  The proximal and distal ends of the tubal segment were then ligated with 0 plain gut suture.  The left tubal segment was sharply excised.  The excision site was made hemostatic with Bovie cautery if needed. A portion in the isthmic region of the right tube was grasped with a Em clamp and elevated.  A window was then made in the mesosalpinx with Bovie cautery.  The proximal and distal ends of the tubal segment were then ligated with 0 plain gut suture.  The right tubal segment was sharply excised.  The excision site was made hemostatic with Bovie cautery if needed.   The uterine incision was reinspected with hemostasis noted.  The tubal sites were reinspected and noted to be intact and hemostatic.  The retractor was removed.  The  parietal peritoneum was then closed with a running suture of 2-0 Vicryl Rapide.  The subfascial spaces and rectus muscles were inspected with hemostasis noted.  The fascia was then closed with a running suture of 0 Vicryl.  The subcutaneous tissues were irrigated and made hemostatic with Bovie cautery.  The subcutaneous tissues were reapproximated with interrupted sutures of 2-0 plain gut.  The skin was closed with a subcuticular stitch of 3-0 Vicryl Rapide and reinforced with Steri-Strips.  A sterile dressing was then applied.    After expressing the uterus the patient was transitioned to the stretcher and taken to the recovery room in stable condition.  She tolerated the procedure well without complications.  All sponge, needle, and instrument counts were correct x3 per the OR staff.    Aakash Sinha MD   8/12/2023  11:39 CDT

## 2023-08-12 NOTE — ANESTHESIA POSTPROCEDURE EVALUATION
Patient: Luh Tapia    Procedure Summary       Date: 23 Room / Location:  [facility] DELIVERY 2 /  [facility] DELIVERY    Anesthesia Start: 101 Anesthesia Stop:     Procedure:  SECTION REPEAT WITH TUBAL (Bilateral: Abdomen) Diagnosis:       Previous  delivery, antepartum      (Previous  delivery, antepartum [O34.219])    Surgeons: Aakash Sinha MD Provider: Miky Hairston CRNA    Anesthesia Type: spinal ASA Status: 2 - Emergent            Anesthesia Type: spinal    Vitals  Vitals Value Taken Time   /68 23 1510   Temp 98.1 øF (36.7 øC) 23 1510   Pulse 72 23 1510   Resp 16 23 1510   SpO2 97 % 23 1510           Post Anesthesia Care and Evaluation      Comments: Pt complains of itching after 20 mcg& 40 mcg of narcan IVP.  Zofran 12mg & droperidol 1.25mg IVP. Will continue to monitor status

## 2023-08-12 NOTE — PLAN OF CARE
Problem: Infection (Labor)  Goal: Absence of Infection Signs and Symptoms  Outcome: Met     Problem: Uterine Tachysystole (Labor)  Goal: Normal Uterine Contraction Pattern  Outcome: Met     Problem: Bleeding (Labor)  Goal: Hemostasis  Outcome: Unable to Meet, Plan Revised     Problem: Change in Fetal Wellbeing (Labor)  Goal: Stable Fetal Wellbeing  Outcome: Unable to Meet, Plan Revised     Problem: Delayed Labor Progression (Labor)  Goal: Effective Progression to Delivery  Outcome: Unable to Meet, Plan Revised     Problem: Labor Pain (Labor)  Goal: Acceptable Pain Control  Outcome: Unable to Meet, Plan Revised   Goal Outcome Evaluation:                        Problem: Bleeding (Labor)  Goal: Hemostasis  Outcome: Unable to Meet, Plan Revised     Problem: Change in Fetal Wellbeing (Labor)  Goal: Stable Fetal Wellbeing  Outcome: Unable to Meet, Plan Revised     Problem: Delayed Labor Progression (Labor)  Goal: Effective Progression to Delivery  Outcome: Unable to Meet, Plan Revised     Problem: Labor Pain (Labor)  Goal: Acceptable Pain Control  Outcome: Unable to Meet, Plan Revised

## 2023-08-12 NOTE — PLAN OF CARE
Goal Outcome Evaluation:           Progress: improving  Outcome Evaluation: Vital signs stable. Sleepy. Arouses easily.  at bs. Dressing dry and intact. Fundus firm at 1 below umbilicus. Rubra small. No clots. SCD's in place. Tolerating pain with po and IV pain medication. No n/v. Still itching, but better after anesthesia medicated.

## 2023-08-12 NOTE — ANESTHESIA PROCEDURE NOTES
Intrathecal Block      Patient reassessed immediately prior to procedure    Patient location during procedure: OB  Indication:at surgeon's request and post-op pain management  Performed By  CRNA/CAA: Miky Hairston CRNA  Preanesthetic Checklist  Completed: patient identified, site marked, surgical consent, pre-op evaluation, timeout performed, IV checked, risks and benefits discussed and monitors and equipment checked  Intrathecal Block Prep:  Pt Position:sitting  Sterile Tech:cap, gloves, mask and sterile barrier  Prep:chloraprep  Monitoring:blood pressure monitoring, continuous pulse oximetry and EKG  Intrathecal Block Procedure:  Approach:midline  Guidance:palpation technique  Location:L2-L3  Needle Type:Jesse  Needle Gauge:25 G  Placement of Needle Event: cerebrospinal fluid  Fluid Appearance:clear  Post Assessment:  Patient Tolerance:patient tolerated the procedure well with no apparent complications  Complications:no

## 2023-08-12 NOTE — PLAN OF CARE
Goal Outcome Evaluation:  Plan of Care Reviewed With: patient, spouse   Pt here for labor, wanting to . Pt wanting IV pain medication and an epidural for labor. Wanting a Bilateral Tubal Ligation if she requires a  section. Plans to breast and formula feed infant.     Progress: improving

## 2023-08-12 NOTE — ANESTHESIA PREPROCEDURE EVALUATION
Anesthesia Evaluation     history of anesthetic complications:  PONV  NPO Solid Status: > 8 hours  NPO Liquid Status: > 4 hours           Airway   Mallampati: II  TM distance: >3 FB  Neck ROM: full  No difficulty expected  Dental      Pulmonary    Cardiovascular     Rhythm: regular        Neuro/Psych  GI/Hepatic/Renal/Endo    (+) obesity    Musculoskeletal     Abdominal    Substance History      OB/GYN    (+) Pregnant        Other                      Anesthesia Plan    ASA 2 - emergent     spinal     intravenous induction     Anesthetic plan, risks, benefits, and alternatives have been provided, discussed and informed consent has been obtained with: patient.    CODE STATUS:    Code Status (Patient has no pulse and is not breathing): CPR (Attempt to Resuscitate)  Medical Interventions (Patient has pulse or is breathing): Full Support

## 2023-08-12 NOTE — H&P
[facility]  Aida Tapia  : 1985  MRN: [id number]  CSN: [id number]    History and Physical    Subjective   Aida Tapia is a 37 y.o. year old  with an Estimated Date of Delivery: 23 currently at 40w1d presenting with regular contractions.    Prenatal care has been with Dr. Nils Sinha.  It has been complicated by previous C/S - (desires ).    OB History    Para Term  AB Living   2 1 1 0 0 1   SAB IAB Ectopic Molar Multiple Live Births   0 0 0 0 0 1      # Outcome Date GA Lbr Ranjith/2nd Weight Sex Delivery Anes PTL Lv   2 Current            1 Term 20 40w3d  4040 g (8 lb 14.5 oz) M CS-LTranv EPI N KAREY      Name: CHENG,BABY BOY AIDA      Apgar1: 8  Apgar5: 9     Past Medical History:   Diagnosis Date    Hemangioma of lip 2017    Right Upper Lip    Lesion of hard palate 2017    Neoplasm of uncertain behavior of lip 2017    PONV (postoperative nausea and vomiting)     EXTREME     Past Surgical History:   Procedure Laterality Date    BREAST SURGERY Bilateral 2014    IMPLANTS      SECTION      HEAD/NECK LESION/CYST EXCISION Bilateral 2017    Procedure: Excision of neoplasm of uncertain behavior of the upper lip with linear closure (80719);  Surgeon: Chris Ham MD;  Location: [facility];  Service:     LIP REPAIR      OVER LAST 26 YEARS HAS HAD 4 OTHER SURGERIES ON LIP       Current Facility-Administered Medications:     acetaminophen (TYLENOL) tablet 650 mg, 650 mg, Oral, Q4H PRN, Dat Gaona MD    lactated ringers bolus 1,000 mL, 1,000 mL, Intravenous, Once PRN, Dat Gaona MD    lactated ringers infusion, 125 mL/hr, Intravenous, Continuous, Dat Gaona MD, Last Rate: 125 mL/hr at 23 0520, 125 mL/hr at 23 0520    Morphine sulfate (PF) injection 2 mg, 2 mg, Intravenous, Q2H PRN, Aakash Sinha MD, 2 mg at 23 0748    [DISCONTINUED] Morphine injection 10 mg, 10 mg, Subcutaneous, Q2H PRN  "**AND** naloxone (NARCAN) injection 0.4 mg, 0.4 mg, Intravenous, Q5 Min PRN, Dat Gaona MD    ondansetron (ZOFRAN) tablet 4 mg, 4 mg, Oral, Q6H PRN **OR** ondansetron (ZOFRAN) injection 4 mg, 4 mg, Intravenous, Q6H PRN, Dat Gaona MD    [COMPLETED] penicillin G potassium 5 Million Units in sodium chloride 0.9 % 100 mL IVPB, 5 Million Units, Intravenous, Once, 5 Million Units at 23 0538 **FOLLOWED BY** penicillin G in iso-osmotic dextrose IVPB 3 million units (premix), 3 Million Units, Intravenous, Q4H, Dat Gaona MD    Sod Citrate-Citric Acid (BICITRA) solution 30 mL, 30 mL, Oral, Once PRN, Dat Gaona MD    terbutaline (BRETHINE) injection 0.25 mg, 0.25 mg, Subcutaneous, PRN, Dat Gaona MD    No Known Allergies  Social History    Tobacco Use      Smoking status: Never      Smokeless tobacco: Never    Review of Systems      Objective   /74   Pulse 75   Temp 98 øF (36.7 øC) (Temporal)   Resp 16   Ht 160 cm (63\")   Wt 76.2 kg (168 lb)   LMP 10/24/2022 (Approximate)   Breastfeeding Yes   BMI 29.76 kg/mý   General: well developed; well nourished  no acute distress   Heart: regular rate and rhythm   Lungs: breathing is unlabored   Abdomen: soft, non-tender; no masses   FHT's: reactive   Cervix: was checked (by RN): 3 cm / 80 % / -3   Presentation: cephalic   Contractions:  EFW by Leopold's:  EFW by recent u/s: regular every 6 minutes           Prenatal Labs  Lab Results   Component Value Date    HGB 12.7 2023    HEPBSAG Non-Reactive 2023    ABORH A POS 2023    ABO A 2023    RH Positive 2023    ABSCRN Negative 2023    HEPCVIRUSABY Non-Reactive 2023       Current Labs Reviewed   No data reviewed       Assessment   IUP at 40w1d  Fetus reassuring  Group B strep status: negative  Previous      Plan   Admit to labor and delivery.  Patient has now decided that she does not want to try to labor and is requesting " .  Has also expressed desire for surgical sterilization.  Will proceed with repeat  and tubal ligation following current  in progress.    Aakash Sinha MD  2023  08:30 CDT

## 2023-08-13 LAB
BASOPHILS # BLD AUTO: 0.04 10*3/MM3 (ref 0–0.2)
BASOPHILS NFR BLD AUTO: 0.2 % (ref 0–1.5)
DEPRECATED RDW RBC AUTO: 47.6 FL (ref 37–54)
EOSINOPHIL # BLD AUTO: 0.01 10*3/MM3 (ref 0–0.4)
EOSINOPHIL NFR BLD AUTO: 0.1 % (ref 0.3–6.2)
ERYTHROCYTE [DISTWIDTH] IN BLOOD BY AUTOMATED COUNT: 14.2 % (ref 12.3–15.4)
HCT VFR BLD AUTO: 29.2 % (ref 34–46.6)
HGB BLD-MCNC: 9.4 G/DL (ref 12–15.9)
IMM GRANULOCYTES # BLD AUTO: 0.13 10*3/MM3 (ref 0–0.05)
IMM GRANULOCYTES NFR BLD AUTO: 0.7 % (ref 0–0.5)
LYMPHOCYTES # BLD AUTO: 1.73 10*3/MM3 (ref 0.7–3.1)
LYMPHOCYTES NFR BLD AUTO: 9.9 % (ref 19.6–45.3)
MCH RBC QN AUTO: 29.3 PG (ref 26.6–33)
MCHC RBC AUTO-ENTMCNC: 32.2 G/DL (ref 31.5–35.7)
MCV RBC AUTO: 91 FL (ref 79–97)
MONOCYTES # BLD AUTO: 1.22 10*3/MM3 (ref 0.1–0.9)
MONOCYTES NFR BLD AUTO: 7 % (ref 5–12)
NEUTROPHILS NFR BLD AUTO: 14.42 10*3/MM3 (ref 1.7–7)
NEUTROPHILS NFR BLD AUTO: 82.1 % (ref 42.7–76)
NRBC BLD AUTO-RTO: 0 /100 WBC (ref 0–0.2)
PLATELET # BLD AUTO: 216 10*3/MM3 (ref 140–450)
PMV BLD AUTO: 11.3 FL (ref 6–12)
RBC # BLD AUTO: 3.21 10*6/MM3 (ref 3.77–5.28)
WBC NRBC COR # BLD: 17.55 10*3/MM3 (ref 3.4–10.8)

## 2023-08-13 PROCEDURE — 0503F POSTPARTUM CARE VISIT: CPT | Performed by: OBSTETRICS & GYNECOLOGY

## 2023-08-13 PROCEDURE — 25010000002 NALBUPHINE PER 10 MG: Performed by: NURSE ANESTHETIST, CERTIFIED REGISTERED

## 2023-08-13 PROCEDURE — 85025 COMPLETE CBC W/AUTO DIFF WBC: CPT | Performed by: OBSTETRICS & GYNECOLOGY

## 2023-08-13 RX ADMIN — ACETAMINOPHEN 1000 MG: 500 TABLET, FILM COATED ORAL at 06:17

## 2023-08-13 RX ADMIN — NALBUPHINE HYDROCHLORIDE 2.5 MG: 10 INJECTION, SOLUTION INTRAMUSCULAR; INTRAVENOUS; SUBCUTANEOUS at 01:28

## 2023-08-13 RX ADMIN — PRENATAL VIT W/ FE FUMARATE-FA TAB 27-0.8 MG 1 TABLET: 27-0.8 TAB at 09:26

## 2023-08-13 RX ADMIN — ACETAMINOPHEN 650 MG: 325 TABLET, FILM COATED ORAL at 21:37

## 2023-08-13 RX ADMIN — IBUPROFEN 600 MG: 600 TABLET, FILM COATED ORAL at 17:05

## 2023-08-13 RX ADMIN — IBUPROFEN 600 MG: 600 TABLET, FILM COATED ORAL at 23:31

## 2023-08-13 RX ADMIN — ACETAMINOPHEN 1000 MG: 500 TABLET, FILM COATED ORAL at 14:02

## 2023-08-13 NOTE — PROGRESS NOTES
Aakash Sinha MD  [facility]  [address]  [place] [address]  Office [phone]  Fax [phone]      [facility]   PROGRESS NOTE    Post-Op Day 1 S/P   Subjective     Patient reports:  Pain is well controlled with ERAS protocol.  She is ambulating. Tolerating diet. Voiding - without difficulty; flatus reported..  Vaginal bleeding is as much as expected.      Objective      Vitals: Vital Signs Range for the last 24 hours  Temperature: Temp:  [97.8 øF (36.6 øC)-98.4 øF (36.9 øC)] 98.3 øF (36.8 øC)   Temp Source: Temp src: Oral   BP: BP: (101-121)/(48-70) 107/64   Pulse: Heart Rate:  [64-91] 78   Respirations: Resp:  [16-18] 17   SPO2: SpO2:  [94 %-100 %] 99 %   O2 Amount (l/min):     O2 Devices Device (Oxygen Therapy): room air   Weight: Weight:  [76.2 kg (168 lb)] 76.2 kg (168 lb)            Physical Exam    Lungs clear to auscultation bilaterally   Abdomen Soft, approp tender   Incision  Dressing clean and dry   Extremities edema trace and Homans sign is negative, no sign of DVT     I reviewed the patient's new clinical results.  Lab Results (last 24 hours)       Procedure Component Value Units Date/Time    CBC & Differential [[id number]]  (Abnormal) Collected: 23    Specimen: Blood Updated: 23    Narrative:      The following orders were created for panel order CBC & Differential.  Procedure                               Abnormality         Status                     ---------                               -----------         ------                     CBC Auto Differential[[id number]]        Abnormal            Final result                 Please view results for these tests on the individual orders.    CBC Auto Differential [[id number]]  (Abnormal) Collected: 23    Specimen: Blood Updated: 23     WBC 17.55 10*3/mm3      RBC 3.21 10*6/mm3      Hemoglobin 9.4 g/dL      Hematocrit 29.2 %      MCV 91.0 fL      MCH 29.3 pg       MCHC 32.2 g/dL      RDW 14.2 %      RDW-SD 47.6 fl      MPV 11.3 fL      Platelets 216 10*3/mm3      Neutrophil % 82.1 %      Lymphocyte % 9.9 %      Monocyte % 7.0 %      Eosinophil % 0.1 %      Basophil % 0.2 %      Immature Grans % 0.7 %      Neutrophils, Absolute 14.42 10*3/mm3      Lymphocytes, Absolute 1.73 10*3/mm3      Monocytes, Absolute 1.22 10*3/mm3      Eosinophils, Absolute 0.01 10*3/mm3      Basophils, Absolute 0.04 10*3/mm3      Immature Grans, Absolute 0.13 10*3/mm3      nRBC 0.0 /100 WBC             External Prenatal Results       Pregnancy Outside Results - Transcribed From Office Records - See Scanned Records For Details       Test Value Date Time    ABO  A  08/12/23 0520    Rh  Positive  08/12/23 0520    Antibody Screen  Negative  08/12/23 0520      ^ NEG  01/23/23 1114    Varicella IgG       Rubella ^ Reactive  01/23/23 1114    Hgb  9.4 g/dL 08/13/23 0551       12.7 g/dL 08/12/23 0520       11.6 g/dL 05/11/23 1329      ^ 12.4 g/dL 01/28/23 1515      ^ 13.3 g/dL 01/27/23 1907      ^ 13.4 g/dL 01/23/23 1114    Hct  29.2 % 08/13/23 0551       39.5 % 08/12/23 0520      ^ 35.9 % 01/28/23 1515      ^ 39.6 % 01/27/23 1907      ^ 38.7 % 01/23/23 1114    Glucose Fasting GTT  71 mg/dL 05/16/23 0723    Glucose Tolerance Test 1 hour  119 mg/dL 05/16/23 0723    Glucose Tolerance Test 3 hour  83 mg/dL 05/16/23 0723    Gonorrhea (discrete)  Negative  07/18/23 1304    Chlamydia (discrete)  Negative  07/18/23 1304    RPR ^ Non-reactive  01/23/23 1114    VDRL       Syphilis Antibody       HBsAg ^ Non-Reactive  01/23/23 1114    Herpes Simplex Virus PCR       Herpes Simplex VIrus Culture       HIV       Hep C RNA Quant PCR       Hep C Antibody ^ Non-Reactive  01/23/23 1114    AFP       Group B Strep  Positive  07/18/23 1304    GBS Susceptibility to Clindamycin       GBS Susceptibility to Erythromycin       Fetal Fibronectin       Genetic Testing, Maternal Blood                 Drug Screening       Test Value Date  Time    Urine Drug Screen       Amphetamine Screen ^ Negative  20 1700    Barbiturate Screen ^ Negative  20 1700    Benzodiazepine Screen ^ Negative  20 1700    Methadone Screen       Phencyclidine Screen       Opiates Screen ^ Negative  20 1700    THC Screen       Cocaine Screen       Propoxyphene Screen       Buprenorphine Screen       Methamphetamine Screen       Oxycodone Screen       Tricyclic Antidepressants Screen                 Legend    ^: Historical                            Assessment & Plan        Previous  delivery, antepartum      Assessment:    Luh Tapia is Day 1  post-partum    , Low Transverse     .       Plan:  remove dressing and continue post op care.        Aakash Sinha MD  2023  10:48 CDT

## 2023-08-13 NOTE — PLAN OF CARE
Goal Outcome Evaluation:           Progress: improving  Outcome Evaluation: Vital stable. Tolerating pain with po and IV pain medications. Had to encourage to take medications as prescribed. Voiding without difficulty post sue catheter discontinuation. No clots. Rubra small. Does not want to start pumping at present. States still been offering breast. Fundus firm at 2 below umbilcus. Steri strips in place without reddness, bleeding or drainage.

## 2023-08-14 ENCOUNTER — PATIENT OUTREACH (OUTPATIENT)
Dept: LABOR AND DELIVERY | Facility: HOSPITAL | Age: 38
End: 2023-08-14
Payer: COMMERCIAL

## 2023-08-14 VITALS
BODY MASS INDEX: 29.77 KG/M2 | OXYGEN SATURATION: 98 % | DIASTOLIC BLOOD PRESSURE: 72 MMHG | WEIGHT: 168 LBS | HEART RATE: 74 BPM | SYSTOLIC BLOOD PRESSURE: 99 MMHG | HEIGHT: 63 IN | RESPIRATION RATE: 16 BRPM | TEMPERATURE: 97.7 F

## 2023-08-14 PROCEDURE — 0503F POSTPARTUM CARE VISIT: CPT | Performed by: OBSTETRICS & GYNECOLOGY

## 2023-08-14 RX ORDER — TRAMADOL HYDROCHLORIDE 50 MG/1
50 TABLET ORAL EVERY 6 HOURS PRN
Qty: 12 TABLET | Refills: 0 | Status: SHIPPED | OUTPATIENT
Start: 2023-08-14

## 2023-08-14 RX ORDER — IBUPROFEN 600 MG/1
600 TABLET ORAL EVERY 6 HOURS PRN
Qty: 40 TABLET | Refills: 1 | Status: SHIPPED | OUTPATIENT
Start: 2023-08-14

## 2023-08-14 RX ORDER — PSEUDOEPHEDRINE HCL 30 MG
100 TABLET ORAL 2 TIMES DAILY PRN
Qty: 60 CAPSULE | Refills: 0 | Status: SHIPPED | OUTPATIENT
Start: 2023-08-14

## 2023-08-14 RX ADMIN — IBUPROFEN 600 MG: 600 TABLET, FILM COATED ORAL at 05:33

## 2023-08-14 RX ADMIN — IBUPROFEN 600 MG: 600 TABLET, FILM COATED ORAL at 11:19

## 2023-08-14 RX ADMIN — ACETAMINOPHEN 650 MG: 325 TABLET, FILM COATED ORAL at 04:10

## 2023-08-14 RX ADMIN — IBUPROFEN 600 MG: 600 TABLET, FILM COATED ORAL at 16:54

## 2023-08-14 RX ADMIN — DOCUSATE SODIUM 100 MG: 100 CAPSULE, LIQUID FILLED ORAL at 08:31

## 2023-08-14 RX ADMIN — OXYCODONE HYDROCHLORIDE 10 MG: 5 TABLET ORAL at 11:20

## 2023-08-14 RX ADMIN — ACETAMINOPHEN 650 MG: 325 TABLET, FILM COATED ORAL at 10:27

## 2023-08-14 RX ADMIN — PRENATAL VIT W/ FE FUMARATE-FA TAB 27-0.8 MG 1 TABLET: 27-0.8 TAB at 08:31

## 2023-08-14 NOTE — LACTATION NOTE
Mother's Name: Luh  Phone #:[phone]  Infant Name: Theo  :2022  Gestation:40w1d  Day of life:2  Birth weight:  7-11.1 (3490g) Discharge weight: 7-5.6 (3335g)  Weight Loss: -4.44%  24 hour Summary of Feeds: 5BF  5 Formula (total 102 ml)  Voids: 4 Stools:2  Assistive devices (shields, shells, etc):na  Significant Maternal history: , AMA,  1st child for 6 months and supplemented  Maternal Concerns:  denies  Maternal Goal: breastfeed and formula feed for 6 months  Mother's Medications: PNV, Fiber, Probiotic  Breastpump for home: yes Medela Maxflow  Ped follow up appt: [place] 8/15/2023      F/u with mother to discuss feeding goals and progress. Mother states bfing going well, plans to wean from formula supplementing as milk supply increases. Just began pumping this morning to relieve engorgement after a feeding. Praise provided. Reiterated importance of pumping when bottles provided, to support milk supply. Breastfeeding after discharge handout given and reviewed. Questions denied. Mother requesting RX for new pump, unable to recall when she received her Medela. RX faxed to [facility]. Further concerns denied. To see NP in pediatric office tomorrow. Reiterated [facility] lactation outpatient services available if mother has any lactation concerns. Encouragement and support provided.     Instructed mom our lactation team is here for continued support throughout their breastfeeding journey. Our team has encouraged mom to call with any questions or concerns that may arise after discharge.        Signs of Milk: Fullness, firmness, heaviness of breasts, leaking of milk.  Signs of Good Feed: Breast fullness prior to feed, breasts soft and comfortable after feeding. Infant content after feeding: calm, sleepy, relaxed and without continued hunger cues.  Signs of Plugged Ducts, Engorgement and Mastitis: Plugged ducts (milk entrapment in milk ducts)- small tender knots that often feel like little beans under  breast tissue, usually tender. Massage on these areas of concern while breastfeeding or pumping to promote emptying.   Engorgement- fluid or excess milk, breasts become uncomfortably full, tight, firm (compare to the firmness of your cheek (mild), chin (moderate) or forehead (severe). First line of treatment should be to BREASTFEED, if breasts remain full feeling after a feeding, it may be necessary to pump, ONLY UNTIL BREASTS ARE SOFT AND COMFORTABLE. DO NOT OVER PUMP (complete emptying of breasts can trigger even more milk which will cause continued, recurrent Engorgement).  Mastitis- Infection of the breast tissue, most often caused by plugged ducts that are not adequately treated by emptying, recurrent trauma to nipples or breasts (cracked or bleeding nipples). Signs: redness, swelling, tender knots or fever to breasts as well as generalized fever >101 degrees F that is often sudden onset. Treatment of mastitis, BREASTFEED! Pump after breastfeeding to achieve COMPLETE emptying of affected breast, utilizing massage to areas of concern, may use cold compress to affected area only after breast emptying. May take anti-inflammatories i.e. Ibuprofen, Motrin. CALL your OB for assessment and continued treatment with Antibiotics to adequately treat mastitis.  Infant Care: Over the first 2 weeks it is important to keep record of infant's feeding routine (feeding times and durations), wet and dirty diaper frequency, stool color and any spit ups that may occur.  Keep in mind, ALL babies will lose some weight initially (usually no more than 10% by day 3). Until infant returns to/ surpasses birth weight (which can take up to 2 weeks), it is important to offer feedings AT LEAST EVERY 3 HOURS. Remember, if you choose to supplement infant with formula or previously pumped milk, you should always pump in replacement of that feeding in order to promote and maintain a healthy milk supply!  Maternal Care: REST, sleep when the infant  "sleeps, stay hydrated (water is optimal) drink to thirst, increase caloric intake - breastfeeding mother's need an ADDITIONAL 500 calories per day , eat 3 meals/day as well as snacks in between, limit CAFFIENE intake to 2 cups/day. Ask your significant other, family members or friends for help when needed, taking advantage of meal trains, allowing others to help with laundry, house chores, etc can help you focus on what is most important early on after delivery. you and your infant, and breastfeeding!   Medications to CONTINUE: Prenatal Vitamins are important to continue taking while breastfeeding. Fish oil, magnesium/calcium supplements often are helpful to support Mothers and their milk supply as well. Tylenol, Ibuprofen, regular Zyrtec, Claritin are SAFE if you suffer from seasonal allergies. Flonase is safe and often an effective medication to take if suffering from sinus drainage/pressure.  Medications to AVOID: Benadryl, Sudafed, any medications including "DM" or have a drying effect to sinus drainage will also dry a mother's milk up. Birth control- your OB will want to address birth control options with you usually around 4-6 weeks postpartum, be sure to notify your MD if you continue to breastfeed as some birth controls may significantly decrease your milk supply. Herbals- some herbs may also decrease your milk supply: PEPPERMINT, MENTHOL in any form (candies, essential oils, teas, etc), so check labels and avoid using in excess.  Pumping: Although we encourage you to focus on breastfeeding over the first 2-4 weeks, you will need to plan to begin pumping. We do recommend implementing pumping by the time infant is 4 weeks old. Pump 2-3 times per day immediately AFTER breastfeeding, it is normal to collect very small amounts initially, but the more consistently you pump, the more you will begin to collect. Store collected milk in refrigerator or freezer. You should also begin offering infant a bottle around 4 " weeks. Remember to use slow flow nipples and PACE the bottle-feed. A bottle feed should take about as long as a breastfeeding session.

## 2023-08-14 NOTE — DISCHARGE SUMMARY
[facility]    Octavio Hanna MD  [address]  [place] [address]  [phone]      Discharge Summary      Luh Tapia  : 1985  MRN: [id number]  CSN: [id number]    Date of Admission: 2023   Date of Discharge:  2023   Delivering Physician:         Admission Diagnosis: Normal labor [O80, Z37.9]  H/o  delivery     Discharge Diagnosis: Pregnancy at 40w1d - delivered  S/p repeat c/s       Procedures: 2023  - , Low Transverse       Presenting Problem/History of Present Illness  Active Hospital Problems    Diagnosis  POA    **Previous  delivery, antepartum [O34.219]  Yes      Resolved Hospital Problems    Diagnosis Date Resolved POA    Normal labor [O80, Z37.9] 2023 Not Applicable        Hospital Course   Patient is a 37 y.o.  who at 40w1d had a  section. See the completed operative report for details regarding antepartum course and delivery. Her post-operative course was unremarkable.  On POD # 2 she felt like she ready for discharge.  She had no febrile morbidity. She had normal bowel and bladder function and was hemodynamically stable.  Her wound was healing well without obvious signs of infections.    Infant  male  fetus weighing 3490 g (7 lb 11.1 oz)   Apgars -  8 @ 1 minute /  9 @ 5 minutes.    Procedures Performed    Procedure(s):   SECTION REPEAT WITH TUBAL  -------------------       Consults:   Consults       No orders found from 2023 to 2023.            Pertinent Test Results:   CBC          2023    13:29 2023    05:20 2023    05:51   CBC   WBC  11.60  17.55    RBC  4.42  3.21    Hemoglobin 11.6  12.7  9.4    Hematocrit  39.5  29.2    MCV  89.4  91.0    MCH  28.7  29.3    MCHC  32.2  32.2    RDW  14.1  14.2    Platelets  217  216        Condition on Discharge:  Stable    Vital Signs  Temp:  [97.7 øF (36.5 øC)-98.8 øF (37.1 øC)] 97.7 øF (36.5 øC)  Heart Rate:  [70-78]  74  Resp:  [16-18] 16  BP: ()/(60-72) 99/72    Physical Exam:   No exam performed today,    Discharge Disposition  Home or Self Care    Discharge Medications     Discharge Medications        New Medications        Instructions Start Date   docusate sodium 100 MG capsule   100 mg, Oral, 2 Times Daily PRN      ibuprofen 600 MG tablet  Commonly known as: ADVIL,MOTRIN   600 mg, Oral, Every 6 Hours PRN      traMADol 50 MG tablet  Commonly known as: Ultram   50 mg, Oral, Every 6 Hours PRN             Continue These Medications        Instructions Start Date   Breast Pump misc   1 each, Does not apply, As Needed      EQ FIBER SUPPLEMENT PO   Oral      PRENATAL 1 PO   Oral      PROBIOTIC ADVANCED PO   1 tablet, Oral, Daily               Discharge Diet:  Home diet    Activity at Discharge: , pelvic rest, no lifting greater than baby's weight    Follow-up Appointments  No future appointments.    Follow-up for postpartum visit/ incision check in 2 weeks with Dr. Sinha.    Test Results Pending at Discharge  Pending Labs       Order Current Status    Tissue Pathology Exam In process    Tissue Pathology Exam In process             Octavio Hanna MD  23  11:36 CDT    Time: Discharge <30 min

## 2023-08-14 NOTE — ANESTHESIA POSTPROCEDURE EVALUATION
Patient: Luh Tapia    Procedure Summary       Date: 23 Room / Location: [facility] LABOR DELIVERY 2 /  [facility] LABOR DELIVERY    Anesthesia Start: 1019 Anesthesia Stop: 1123    Procedure:  SECTION REPEAT WITH TUBAL (Bilateral: Abdomen) Diagnosis:       Previous  delivery, antepartum      (Previous  delivery, antepartum [O34.219])    Surgeons: Aakash Sinha MD Provider: Mkiy Hairston CRNA    Anesthesia Type: spinal ASA Status: 2 - Emergent            Anesthesia Type: spinal    Vitals  Vitals Value Taken Time   BP 99/72 23 1121   Temp 97.7 øF (36.5 øC) 23 0748   Pulse 74 23 1121   Resp 16 23 1121   SpO2 98 % 23 1121           Post Anesthesia Care and Evaluation    Patient location during evaluation: floor  Patient participation: complete - patient participated  Level of consciousness: awake and alert and anxious  Pain score: 5    Airway patency: patent  Anesthetic complications: No anesthetic complications  PONV Status: NA  Cardiovascular status: acceptable  Respiratory status: acceptable  Hydration status: acceptable  Post Neuraxial Block status: Motor and sensory function returned to baseline and No signs or symptoms of PDPH  Comments: POD 2 for c/s. Pt complaining of severe HA. She is sitting upright in bed, states laying supine does not make HA better or worse. Light is on and pt does not endorse any photophobia. Pain has improved after narcotic.  Symptoms sound like a migraine. Discussed pharmaceutical options with pharmacist. All migraine medications cross into breastmilk. Pharmacist recommends alternating ibuprofen and tylenol. Discussed with patient and encouraged increasing fluid intake. Anesthesia signing off.

## 2023-08-14 NOTE — PAYOR COMM NOTE
"ADMIT INPT 23  UR  [id number]    Aida Tapia (37 y.o. Female)       Date of Birth   1985    Social Security Number       Address   [address] [place] [id number]    Home Phone   [phone]    MRN   [id number]       Latter day   Other    Marital Status                               Admission Date   23    Admission Type   Elective    Admitting Provider   Aakash Sinha MD    Attending Provider   Aakash Sinha MD    Department, Room/Bed   [facility] MOTHER BABY 2A, M235/1       Discharge Date       Discharge Disposition       Discharge Destination                                 Attending Provider: Aakash Sinha MD    Allergies: No Known Allergies    Isolation: None   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)   Code Status: CPR    Ht: 160 cm (63\")   Wt: 76.2 kg (168 lb)    Admission Cmt: None   Principal Problem: Previous  delivery, antepartum [O34.219]                   Active Insurance as of 2023       Primary Coverage       Payor Plan Insurance Group Employer/Plan Group    [employer] [place] [employer] [place]ICA[place]        Payor Plan Address Payor Plan Phone Number Payor Plan Fax Number Effective Dates    [address] [address] [phone]  2023 - None Entered    [facility] [id number]         Subscriber Name Subscriber Birth Date Member ID       AIDA TAPIA 1985 [id number]                     Emergency Contacts        (Rel.) Home Phone Work Phone Mobile Phone    En Tapia (Spouse) [phone] -- [phone]                 History & Physical        Aakash Sinha MD at 23 0830          [facility]  Aida Tapia  : 1985  MRN: [id number]  CSN: [id number]    History and Physical    Subjective  Aida Tapia is a 37 y.o. year old  with an Estimated Date of Delivery: 23 currently at 40w1d presenting with regular contractions.    Prenatal care has been with Dr. Nils Sinha.  It has " been complicated by previous C/S - (desires ).    OB History    Para Term  AB Living   2 1 1 0 0 1   SAB IAB Ectopic Molar Multiple Live Births   0 0 0 0 0 1      # Outcome Date GA Lbr Ranjith/2nd Weight Sex Delivery Anes PTL Lv   2 Current            1 Term 20 40w3d  4040 g (8 lb 14.5 oz) M CS-LTranv EPI N KAREY      Name: CHENG,JHON PARRA      Apgar1: 8  Apgar5: 9     Past Medical History:   Diagnosis Date    Hemangioma of lip 2017    Right Upper Lip    Lesion of hard palate 2017    Neoplasm of uncertain behavior of lip 2017    PONV (postoperative nausea and vomiting)     EXTREME     Past Surgical History:   Procedure Laterality Date    BREAST SURGERY Bilateral     IMPLANTS      SECTION      HEAD/NECK LESION/CYST EXCISION Bilateral 2017    Procedure: Excision of neoplasm of uncertain behavior of the upper lip with linear closure ([id number]);  Surgeon: Chris Ham MD;  Location: [facility];  Service:     LIP REPAIR      OVER LAST 26 YEARS HAS HAD 4 OTHER SURGERIES ON LIP       Current Facility-Administered Medications:     acetaminophen (TYLENOL) tablet 650 mg, 650 mg, Oral, Q4H PRN, Dat Gaona MD    lactated ringers bolus 1,000 mL, 1,000 mL, Intravenous, Once PRN, Dat Gaona MD    lactated ringers infusion, 125 mL/hr, Intravenous, Continuous, Dat Gaona MD, Last Rate: 125 mL/hr at 23 0520, 125 mL/hr at 23 0520    Morphine sulfate (PF) injection 2 mg, 2 mg, Intravenous, Q2H PRN, Aakash Sinha MD, 2 mg at 23 0748    [DISCONTINUED] Morphine injection 10 mg, 10 mg, Subcutaneous, Q2H PRN **AND** naloxone (NARCAN) injection 0.4 mg, 0.4 mg, Intravenous, Q5 Min PRN, Dat Gaona MD    ondansetron (ZOFRAN) tablet 4 mg, 4 mg, Oral, Q6H PRN **OR** ondansetron (ZOFRAN) injection 4 mg, 4 mg, Intravenous, Q6H PRN, Dat Gaona MD    [COMPLETED] penicillin G potassium 5 Million Units in sodium chloride 0.9 % 100  "mL IVPB, 5 Million Units, Intravenous, Once, 5 Million Units at 23 0538 **FOLLOWED BY** penicillin G in iso-osmotic dextrose IVPB 3 million units (premix), 3 Million Units, Intravenous, Q4H, Dat Gaona MD    Sod Citrate-Citric Acid (BICITRA) solution 30 mL, 30 mL, Oral, Once PRN, Dat Gaona MD    terbutaline (BRETHINE) injection 0.25 mg, 0.25 mg, Subcutaneous, PRN, Dat Gaona MD    No Known Allergies  Social History    Tobacco Use      Smoking status: Never      Smokeless tobacco: Never    Review of Systems      Objective  /74   Pulse 75   Temp 98 øF (36.7 øC) (Temporal)   Resp 16   Ht 160 cm (63\")   Wt 76.2 kg (168 lb)   LMP 10/24/2022 (Approximate)   Breastfeeding Yes   BMI 29.76 kg/mý   General: well developed; well nourished  no acute distress   Heart: regular rate and rhythm   Lungs: breathing is unlabored   Abdomen: soft, non-tender; no masses   FHT's: reactive   Cervix: was checked (by RN): 3 cm / 80 % / -3   Presentation: cephalic   Contractions:  EFW by Leopold's:  EFW by recent u/s: regular every 6 minutes           Prenatal Labs  Lab Results   Component Value Date    HGB 12.7 2023    HEPBSAG Non-Reactive 2023    ABORH A POS 2023    ABO A 2023    RH Positive 2023    ABSCRN Negative 2023    HEPCVIRUSABY Non-Reactive 2023       Current Labs Reviewed   No data reviewed       Assessment  IUP at 40w1d  Fetus reassuring  Group B strep status: negative  Previous      Plan  Admit to labor and delivery.  Patient has now decided that she does not want to try to labor and is requesting .  Has also expressed desire for surgical sterilization.  Will proceed with repeat  and tubal ligation following current  in progress.    Aakash Sinha MD  2023  08:30 CDT         Electronically signed by Aakash Sinha MD at 23 0832       Facility-Administered Medications as of 2023 "   Medication Dose Route Frequency Provider Last Rate Last Admin    [COMPLETED] acetaminophen (TYLENOL) tablet 1,000 mg  1,000 mg Oral Once [place] MD   1,000 mg at 23 0924    [] acetaminophen (TYLENOL) tablet 1,000 mg  1,000 mg Oral Q6H Bharath, Aakash SORIANO MD   1,000 mg at 23 1402    Followed by    acetaminophen (TYLENOL) tablet 650 mg  650 mg Oral Q6H Aakash Sinha MD        [COMPLETED] azithromycin (ZITHROMAX) 500 mg in sodium chloride 0.9 % 250 mL IVPB-VTB  500 mg Intravenous Once Aakash Sinha MD   500 mg at 23 0925    carboprost (HEMABATE) injection 250 mcg  250 mcg Intramuscular PRN Dat Gaona MD        carboprost (HEMABATE) injection 250 mcg  250 mcg Intramuscular Once PRN Aakash Sinha MD        [COMPLETED] ceFAZolin 2000 mg IVPB in 100 mL NS (MBP)  2 g Intravenous Once [place]   2 g at 23 1028    docusate sodium (COLACE) capsule 100 mg  100 mg Oral BID PRN Aakash Sinha MD        droperidol (INAPSINE) injection 0.625 mg  0.625 mg Intravenous Once PRN Miky Hairston CRNA        Or    droperidol (INAPSINE) injection 0.625 mg  0.625 mg Intramuscular Once PRN Miky Hairston CRNA        [COMPLETED] famotidine (PEPCID) injection 20 mg  20 mg Intravenous Once PRN Miky Hairston CRNA   20 mg at 23 0953    HYDROmorphone (DILAUDID) injection 0.5 mg  0.5 mg Intravenous Q2H PRN Aakash Sinha MD        And    naloxone (NARCAN) injection 0.1 mg  0.1 mg Intravenous Q5 Min PRN Aakash Sinha MD        [] ketorolac (TORADOL) injection 15 mg  15 mg Intravenous Q6H Aakash Sinha MD   15 mg at 23 1721    Followed by    ibuprofen (ADVIL,MOTRIN) tablet 600 mg  600 mg Oral Q6H Aakash Sinha MD   600 mg at 23 1705    [COMPLETED] lactated ringers bolus 1,000 mL  1,000 mL Intravenous Once Aakash Sinha MD 2,000 mL/hr at 23 1207 1,000 mL at  23 1207    Measles, Mumps & Rubella Vac (MMR) injection 0.5 mL  0.5 mL Subcutaneous During Hospitalization Aakash Sinha MD        methylergonovine (METHERGINE) injection 200 mcg  200 mcg Intramuscular Once PRN Dat Gaona MD        methylergonovine (METHERGINE) injection 200 mcg  200 mcg Intramuscular PRN Aakash Sinha MD        [COMPLETED] metoclopramide (REGLAN) injection 10 mg  10 mg Intravenous Once Miky Hairston CRNA   10 mg at 23 0953    miSOPROStol (CYTOTEC) tablet 800 mcg  800 mcg Rectal PRN Dat Gaona MD        miSOPROStol (CYTOTEC) tablet 800 mcg  800 mcg Rectal Once PRN Aakash Sinha MD        [] nalbuphine (NUBAIN) injection 2.5 mg  2.5 mg Intravenous Q4H PRN Miky Hairston CRNA   2.5 mg at 23 0128    naloxone (NARCAN) injection 0.2 mg  0.2 mg Intravenous Q5 Min PRN Ganga Asif CRNA   0.2 mg at 23 1432    [] naloxone (NARCAN) injection 0.4 mg  0.4 mg Intravenous Q1H PRN Miky Hairston CRNA        ondansetron (ZOFRAN) injection 4 mg  4 mg Intravenous Q6H PRN Aakash Sinha MD        ondansetron (ZOFRAN) tablet 4 mg  4 mg Oral Q8H PRN Aakash Sinha MD        oxyCODONE (ROXICODONE) immediate release tablet 5 mg  5 mg Oral Q4H PRN Aakash Sinha MD        Or    oxyCODONE (ROXICODONE) immediate release tablet 10 mg  10 mg Oral Q4H PRN Aakash Sinha MD        oxytocin (PITOCIN) 30 units in 0.9% sodium chloride 500 mL (premix)  999 mL/hr Intravenous Once Dat Gaona MD        Followed by    [] oxytocin (PITOCIN) 30 units in 0.9% sodium chloride 500 mL (premix)  250 mL/hr Intravenous Continuous Dat Gaona MD        [] oxytocin (PITOCIN) 30 units in 0.9% sodium chloride 500 mL (premix)  250 mL/hr Intravenous Continuous Aakash Sinha  mL/hr at 23 1415 250 mL/hr at 23 1415    [COMPLETED] penicillin G potassium 5 Million Units in  sodium chloride 0.9 % 100 mL IVPB  5 Million Units Intravenous Once Dat Gaona MD   5 Million Units at 08/12/23 0538    prenatal vitamin tablet 1 tablet  1 tablet Oral Daily Aakash Sinha MD   1 tablet at 08/13/23 0926    simethicone (MYLICON) chewable tablet 80 mg  80 mg Oral 4x Daily PRN Aakash Sinha MD        [COMPLETED] Sod Citrate-Citric Acid (BICITRA) solution 15 mL  15 mL Oral Once Miky Hairston CRNA   15 mL at 08/12/23 0954     Orders (last 7 days)        Start     Ordered    08/13/23 1700  ibuprofen (ADVIL,MOTRIN) tablet 600 mg  Every 6 Hours        See Hyperspace for full Linked Orders Report.    08/12/23 1144    08/13/23 1530  acetaminophen (TYLENOL) tablet 650 mg  Every 6 Hours        See Hyperspace for full Linked Orders Report.    08/12/23 1437    08/13/23 0600  Discontinue Indwelling Urinary Catheter in AM  Once         08/12/23 1530    08/13/23 0600  Abdominal Wound Care  Per Order Details        Comments: Postop Day 1. Remove Dressing & Leave Incision Open to Air 18 to 24 hours after surgery    08/12/23 1530    08/13/23 0600  CBC & Differential  Timed         08/12/23 1530    08/13/23 0600  CBC Auto Differential  PROCEDURE ONCE         08/12/23 2202    08/13/23 0000  Remove Abdominal Dressing  Once        Comments: Remove dressing 18-24 hours after surgery.    08/12/23 1530    08/12/23 1800  Ambulate Patient  2 Times Daily      Comments: After anesthesia wears off.    08/12/23 1530    08/12/23 1700  ketorolac (TORADOL) injection 15 mg  Every 6 Hours        See Hyperspace for full Linked Orders Report.    08/12/23 1144    08/12/23 1645  oxytocin (PITOCIN) 30 units in 0.9% sodium chloride 500 mL (premix)  Continuous        See Hyperspace for full Linked Orders Report.    08/12/23 1530    08/12/23 1630  prenatal vitamin tablet 1 tablet  Daily         08/12/23 1530    08/12/23 1531  Notify Physician (specified)  Until Discontinued         08/12/23 1530    08/12/23 1531   "Vital Signs Per Hospital Policy  Per Hospital Policy         08/12/23 1530    08/12/23 1531  Up Ad Tammy  Until Discontinued         08/12/23 1530    08/12/23 1531  Strict Intake and Output  Every Shift       08/12/23 1530    08/12/23 1531  Fundal & Lochia Check  Per Order Details        Comments: Every 15 Minutes x4, Then Every 30 Minutes x2, Then Every Shift    08/12/23 1530    08/12/23 1531  Fundal & Lochia Check  Every Shift       08/12/23 1530    08/12/23 1531  Code Status and Medical Interventions:  Continuous         08/12/23 1530    08/12/23 1531  Vital Signs Per Hospital Policy  Per Hospital Policy         08/12/23 1530    08/12/23 1531  Notify Physician  Until Discontinued         08/12/23 1530    08/12/23 1531  Patient May Shower  Once        Comments: After Anesthesia Wears Off & With Assistance    08/12/23 1530    08/12/23 1531  I/O  Every Shift       08/12/23 1530    08/12/23 1531  Fundal and Lochia Check  Per Order Details        Comments: Every 30 Minutes x2, Every 1 Hour x4, Every 4 Hours x24 Hours, Then Every Shift.    08/12/23 1530    08/12/23 1531  Weigh Patient  Once         08/12/23 1530    08/12/23 1531  Continue Indwelling Urinary Catheter Already in Place  Once         08/12/23 1530    08/12/23 1531  Notify Provider if Bladder Distention Continues  Until Discontinued         08/12/23 1530    08/12/23 1531  KPad  Per Order Details        Comments: PRN Pain    08/12/23 1530    08/12/23 1531  Turn Cough Deep Breathe  Once         08/12/23 1530    08/12/23 1531  Encourage early intake of PO fluids  Continuous         08/12/23 1530    08/12/23 1531  Ambulate Patient 3-5 times per day (with or without Silverman)  Every Shift       08/12/23 1530    08/12/23 1531  Apply Abdominal Binding Until Discontinued  Until Discontinued         08/12/23 1530    08/12/23 1531  \"If patient tolerates food and liquids after completion of second bag of Pitocin, saline lock IV and discontinue IV fluid infusions.  Once     "     08/12/23 1530    08/12/23 1531  Breast pump to bed  Once         08/12/23 1530    08/12/23 1531  If indicated -- Please administer RH Immunoglobulin based on results of cord blood evaluation and fetal screen lab tests, pharmacy to dispense  Continuous        Comments: See Process Instructions For Reference Range Details.    08/12/23 1530    08/12/23 1531  Place Sequential Compression Device  Once         08/12/23 1530    08/12/23 1531  Maintain Sequential Compression Device  Continuous         08/12/23 1530    08/12/23 1530  docusate sodium (COLACE) capsule 100 mg  2 Times Daily PRN         08/12/23 1530    08/12/23 1530  simethicone (MYLICON) chewable tablet 80 mg  4 Times Daily PRN         08/12/23 1530    08/12/23 1530  ondansetron (ZOFRAN) tablet 4 mg  Every 8 Hours PRN         08/12/23 1530    08/12/23 1530  ondansetron (ZOFRAN) injection 4 mg  Every 6 Hours PRN         08/12/23 1530    08/12/23 1530  carboprost (HEMABATE) injection 250 mcg  Once As Needed         08/12/23 1530    08/12/23 1530  miSOPROStol (CYTOTEC) tablet 800 mcg  Once As Needed         08/12/23 1530    08/12/23 1530  methylergonovine (METHERGINE) injection 200 mcg  As Needed         08/12/23 1530    08/12/23 1530  Measles, Mumps & Rubella Vac (MMR) injection 0.5 mL  During Hospitalization         08/12/23 1530    08/12/23 1530  oxytocin (PITOCIN) 30 units in 0.9% sodium chloride 500 mL (premix)  Once        [facility].    08/12/23 1530    08/12/23 1530  methylergonovine (METHERGINE) injection 200 mcg  Once As Needed         08/12/23 1530    08/12/23 1530  carboprost (HEMABATE) injection 250 mcg  As Needed         08/12/23 1530    08/12/23 1530  miSOPROStol (CYTOTEC) tablet 800 mcg  As Needed         08/12/23 1530    08/12/23 1530  Urinary Catheter Care  Every Shift,   Status:  Canceled       08/12/23 1529    08/12/23 1500  Incentive spirometry RT  Every Hour       08/12/23 1437    08/12/23 1438  Diet:  Regular/House Diet; Texture: Regular Texture (IDDSI 7); Fluid Consistency: Thin (IDDSI 0)  Diet Effective Now         08/12/23 1437    08/12/23 1438  Advance Diet As Tolerated -Regular  Until Discontinued         08/12/23 1437    08/12/23 1437  acetaminophen (TYLENOL) tablet 1,000 mg  Every 6 Hours        See Hyperspace for full Linked Orders Report.    08/12/23 1437    08/12/23 1421  naloxone (NARCAN) injection 0.2 mg  Every 5 Minutes PRN         08/12/23 1423    08/12/23 1300  oxytocin (PITOCIN) 30 units in 0.9% sodium chloride 500 mL (premix)  Continuous        See Hyperspace for full Linked Orders Report.    08/12/23 1147    08/12/23 1245  ketorolac (TORADOL) injection 30 mg  Once,   Status:  Discontinued         08/12/23 1147    08/12/23 1147  Diet: Regular/House Diet; Texture: Regular Texture (IDDSI 7); Fluid Consistency: Thin (IDDSI 0)  Diet Effective Now,   Status:  Canceled         08/12/23 1147    08/12/23 1147  Notify Provider (Specified)  Until Discontinued         08/12/23 1147    08/12/23 1147  Vital Signs Per Hospital Policy  Per Hospital Policy         08/12/23 1147    08/12/23 1147  Strict Bed Rest  Until Discontinued         08/12/23 1147    08/12/23 1147  Fundal & Lochia Check  Per Order Details        Comments: Every 15 Minutes x4, Then Every 30 Minutes x2, Then Every Shift    08/12/23 1147    08/12/23 1147  Fundal & Lochia Check  Every Shift       08/12/23 1147    08/12/23 1147  Indwelling Urinary Catheter  Once,   Status:  Canceled         08/12/23 1147    08/12/23 1147  Diet: Regular/House Diet; Texture: Regular Texture (IDDSI 7); Fluid Consistency: Thin (IDDSI 0)  Diet Effective Now,   Status:  Canceled         08/12/23 1147    08/12/23 1147  Nurse may remove epidural catheter after delivery.  Continuous,   Status:  Canceled         08/12/23 1147    08/12/23 1147  Transfer to Postpartum When Criteria Met  Continuous         08/12/23 1147    08/12/23 1147  Oxygen Therapy- Nasal Cannula; 2 LPM   Continuous,   Status:  Canceled         08/12/23 1147    08/12/23 1147  Respirations  Continuous        Comments: If respiratory rate is less than 10/min, notify the Anesthesiologist    08/12/23 1147    08/12/23 1147  If respiratory is less than 8/min, see Narcan order below. Notify Anesthesiologist STAT.  Continuous         08/12/23 1147    08/12/23 1147  Blood Pressure and Pulse every 4 hours  Continuous,   Status:  Canceled         08/12/23 1147    08/12/23 1147  Activity and removal of sue catheter, per Obstetrician, on routine post-operative orders  Continuous,   Status:  Canceled         08/12/23 1147    08/12/23 1147  Notify Anesthesiologist for any questions/problems  Continuous,   Status:  Canceled         08/12/23 1147    08/12/23 1147  Ambu Bag at Bedside  Continuous         08/12/23 1147    08/12/23 1146  oxytocin (PITOCIN) 30 units in 0.9% sodium chloride 500 mL (premix)  Once,   Status:  Discontinued        See Hyperspace for full Linked Orders Report.    08/12/23 1147    08/12/23 1146  HYDROmorphone (DILAUDID) injection 0.5 mg  Every 2 Hours PRN,   Status:  Discontinued         08/12/23 1147    08/12/23 1146  methylergonovine (METHERGINE) injection 200 mcg  Once As Needed,   Status:  Discontinued         08/12/23 1147    08/12/23 1146  carboprost (HEMABATE) injection 250 mcg  As Needed,   Status:  Discontinued         08/12/23 1147    08/12/23 1146  miSOPROStol (CYTOTEC) tablet 800 mcg  Once As Needed,   Status:  Discontinued         08/12/23 1147    08/12/23 1146  ondansetron (ZOFRAN) tablet 4 mg  Every 6 Hours PRN,   Status:  Discontinued        See Hyperspace for full Linked Orders Report.    08/12/23 1147    08/12/23 1146  ondansetron (ZOFRAN) injection 4 mg  Every 6 Hours PRN,   Status:  Discontinued        See Hyperspace for full Linked Orders Report.    08/12/23 1147    08/12/23 1146  famotidine (PEPCID) injection 20 mg  Once As Needed,   Status:  Discontinued        See Hyperspace for full  Linked Orders Report.    08/12/23 1147    08/12/23 1146  famotidine (PEPCID) tablet 20 mg  Once As Needed,   Status:  Discontinued        See Hyperspace for full Linked Orders Report.    08/12/23 1147    08/12/23 1145  Continuous Pulse Oximetry  Continuous         08/12/23 1144    08/12/23 1144  ondansetron (ZOFRAN) injection 4 mg  Every 6 Hours PRN,   Status:  Discontinued         08/12/23 1144    08/12/23 1144  droperidol (INAPSINE) injection 0.625 mg  Once As Needed        See Hyperspace for full Linked Orders Report.    08/12/23 1144    08/12/23 1144  droperidol (INAPSINE) injection 0.625 mg  Once As Needed        See Hyperspace for full Linked Orders Report.    08/12/23 1144    08/12/23 1144  nalbuphine (NUBAIN) injection 2.5 mg  Every 4 Hours PRN         08/12/23 1144    08/12/23 1144  naloxone (NARCAN) injection 0.4 mg  Every 1 Hour PRN         08/12/23 1144    08/12/23 1144  oxyCODONE (ROXICODONE) immediate release tablet 5 mg  Every 4 Hours PRN        See Hyperspace for full Linked Orders Report.    08/12/23 1144    08/12/23 1144  oxyCODONE (ROXICODONE) immediate release tablet 10 mg  Every 4 Hours PRN        See Hyperspace for full Linked Orders Report.    08/12/23 1144    08/12/23 1144  HYDROmorphone (DILAUDID) injection 0.5 mg  Every 2 Hours PRN        See Hyperspace for full Linked Orders Report.    08/12/23 1144    08/12/23 1144  naloxone (NARCAN) injection 0.1 mg  Every 5 Minutes PRN        See Hyperspace for full Linked Orders Report.    08/12/23 1144    08/12/23 1139  Transfer Patient  Once         08/12/23 1139    08/12/23 1030  Sod Citrate-Citric Acid (BICITRA) solution 15 mL  Once         08/12/23 0940    08/12/23 1030  metoclopramide (REGLAN) injection 10 mg  Once         08/12/23 0940    08/12/23 1007  Tissue Pathology Exam  Once         08/12/23 1007    08/12/23 1007  Tissue Pathology Exam  Once         08/12/23 1007    08/12/23 0945  penicillin G in iso-osmotic dextrose IVPB 3 million units  (premix)  Every 4 Hours,   Status:  Discontinued        See Hyperspace for full Linked Orders Report.    08/12/23 0502    08/12/23 0941  Notify physician for the following conditions:  Until Discontinued,   Status:  Canceled         08/12/23 0940    08/12/23 0940  ondansetron (ZOFRAN) injection 4 mg  Once As Needed,   Status:  Discontinued         08/12/23 0940    08/12/23 0940  famotidine (PEPCID) injection 20 mg  Once As Needed         08/12/23 0940    08/12/23 0930  lactated ringers bolus 1,000 mL  Once         08/12/23 0834    08/12/23 0930  acetaminophen (TYLENOL) tablet 1,000 mg  Once         08/12/23 0834    08/12/23 0930  ceFAZolin 2000 mg IVPB in 100 mL NS (MBP)  Once         08/12/23 0834    08/12/23 0930  azithromycin (ZITHROMAX) 500 mg in sodium chloride 0.9 % 250 mL IVPB-VTB  Once         08/12/23 0834    08/12/23 0835  Reason for Discontinuing Lactic Acid: Not Septic  Once         08/12/23 0834    08/12/23 0835  Reason for Discontinuing Lactic Acid: Not Septic  Once         08/12/23 0834    08/12/23 0834  Case Request  Once         08/12/23 0834    08/12/23 0833  Code Status and Medical Interventions:  Continuous,   Status:  Canceled         08/12/23 0834    08/12/23 0833  Obtain Informed Consent  Once,   Status:  Canceled         08/12/23 0834    08/12/23 0833  Vital Signs Per Hospital Policy  Per Hospital Policy,   Status:  Canceled         08/12/23 0834    08/12/23 0833  Notify NICU to Attend Birth  Until Discontinued,   Status:  Canceled        Comments: NICU to attend birth    08/12/23 0834    08/12/23 0833  Bed Rest With Bathroom Privileges  Once,   Status:  Canceled         08/12/23 0834    08/12/23 0833  Insert Indwelling Urinary Catheter  Once,   Status:  Canceled        See Hyperspace for full Linked Orders Report.    08/12/23 0834    08/12/23 0833  Assess Need for Indwelling Urinary Catheter - Follow Removal Protocol  Continuous,   Status:  Canceled        Comments: Indwelling Urinary  Catheter Removal Criteria  Discontinue Indwelling Urinary Catheter Unless One of the Following is Present:  Urinary Retention or Obstruction  Chronic Urinary Catheter Use  End of Life  Critical Illness with Strict I/O   Tract or Abdominal Surgery  Stage 3/4 Sacral / Perineal Wound  Required Activity Restriction: Trauma  Required Activity Restriction: Spine Surgery  If Patient is Being Followed by Urology Contact Them PRIOR to Removal  Do Not Remove Indwelling Urinary Catheter Order is Present with a CLINICAL REASON to Maintain the Catheter. Provider is Required to Include a Clinical Reason to Maintain a Urinary Catheter    Patient Admitted With Indwelling Urinary Catheter (Not Placed at [facility])  Assess for Continued Need & Document Medical Necessity  If Infection is Suspected, Contact the Provider       See Hyperspace for full Linked Orders Report.    08/12/23 0834    08/12/23 0833  Urinary Catheter Care  Every Shift,   Status:  Canceled      See Hyperspace for full Linked Orders Report.    08/12/23 0834    08/12/23 0833  Abdominal Prep With Clippers  Once,   Status:  Canceled         08/12/23 0834    08/12/23 0833  Chlorhexadine Skin Prep Unless Otherwise Indicated  Once,   Status:  Canceled         08/12/23 0834    08/12/23 0833  SCD (Sequential Compression Devices)  Once,   Status:  Canceled         08/12/23 0834    08/12/23 0833  POC Glucose Once  Once,   Status:  Canceled         08/12/23 0834    08/12/23 0833  Document Gatorade Consumption Prior to Admission (Yes or No)  Once,   Status:  Canceled         08/12/23 0834    08/12/23 0833  Inpatient Anesthesiology Consult  Once,   Status:  Canceled        Specialty:  Anesthesiology  Provider:  (Not yet assigned)    08/12/23 0834    08/12/23 0600  lactated ringers infusion  Continuous,   Status:  Discontinued         08/12/23 0502    08/12/23 0545  penicillin G potassium 5 Million Units in sodium chloride 0.9 % 100 mL IVPB  Once        See Hyperspace  for full Linked Orders Report.    08/12/23 0502    08/12/23 0530  Morphine sulfate (PF) injection 2 mg  Every 2 Hours PRN,   Status:  Discontinued         08/12/23 0532    08/12/23 0456  naloxone (NARCAN) injection 0.4 mg  Every 5 Minutes PRN,   Status:  Discontinued        See Hyperspace for full Linked Orders Report.    08/12/23 0502    08/12/23 0456  Morphine injection 10 mg  Every 2 Hours PRN,   Status:  Discontinued        See Hyperspace for full Linked Orders Report.    08/12/23 0502    08/12/23 0455  Admit To Obstetrics Inpatient  Once         08/12/23 0502    08/12/23 0455  Code Status and Medical Interventions:  Continuous,   Status:  Canceled         08/12/23 0502 08/12/23 0455  Obtain informed consent  Once,   Status:  Canceled         08/12/23 0502 08/12/23 0455  VTE Prophylaxis Not Indicated: No Risk Factors (0); </= 3 (Low Risk)  Once         08/12/23 0502 08/12/23 0455  Vital Signs Per Hospital Policy  Per Hospital Policy,   Status:  Canceled         08/12/23 0502 08/12/23 0455  Confirm presence of amniotic fluid  Once,   Status:  Canceled        Comments: If patient present with SROM    08/12/23 0502 08/12/23 0455  Continuous Fetal Monitoring With NST on Admission and Prior to Initiation of Oxytocin.  Per Order Details,   Status:  Canceled        Comments: Continuous Fetal Monitoring With NST on Admission & Prior to Initiation of Oxytocin.    08/12/23 0502 08/12/23 0455  External Uterine Contraction Monitoring  Per Hospital Policy,   Status:  Canceled         08/12/23 0502    08/12/23 0455  Notify Physician (specified)  Until Discontinued,   Status:  Canceled         08/12/23 0502 08/12/23 0455  Notify physician for tachysystole (per hospital algorithm)  Until Discontinued,   Status:  Canceled         08/12/23 0502 08/12/23 0455  Notify physician if membranes ruptured, bleeding greater than 1 pad an hour, fetal heart tone abnormality, and severe pain  Until Discontinued,    Status:  Canceled         23 0502    23  Up Ad Tammy  Until Discontinued,   Status:  Canceled         23 05023  Intake and Output  Every Shift,   Status:  Canceled      Comments: Every shift if on IV Tocolytics.    23 05023  Cervical Exam  Once,   Status:  Canceled        Comments: Unless contraindicated, every 1-2 hours in active labor, or at nurse's discretion.    23 05023  Initiate Group Beta Strep (GBS) Prophylaxis Protocol, If Criteria Met  Continuous,   Status:  Canceled        Comments: NO TREATMENT RECOMMENDED IF: 1)  Maternal GBS status known negative 2)  Scheduled  birth with intact membranes, not in labor.  3 ) Maternal GBS unknown, no risk factors.   TREAT WITH ANTIBIOTICS IF:  1)  Maternal GBS status is known postive.  2)  Maternal GBS status unknown with these risk factors:  a)  Previous infant affected by GBS infection.  b)  GBS urinary tract infection (UTI) or bacteruria during pregnancy  c)  Unexplained maternal fever in labor (greater than or equal to 100.4F or 38.0C)  d)  Prolonged rupture of the membranes greater than or equal to 18 hours.  e)  Gestational age less than 37 weeks.    23 05023  NPO Diet NPO Type: Ice Chips  Diet Effective Now,   Status:  Canceled         23  Inpatient Anesthesiology Consult  Once,   Status:  Canceled        Specialty:  Anesthesiology  Provider:  (Not yet assigned)    23 05023  CBC & Differential  Once         23  Type & Screen  Once         23  CBC Auto Differential  PROCEDURE ONCE         23  Position Change - For Intra-Uterine Resusitation for Hypertonus, HyperStimulation or Non-Reassuring Fetal Status  As Needed,   Status:  Canceled       23 05023  lactated ringers bolus 1,000 mL  Once As Needed,    Status:  Discontinued         08/12/23 0502    08/12/23 0454  acetaminophen (TYLENOL) tablet 650 mg  Every 4 Hours PRN,   Status:  Discontinued         08/12/23 0502    08/12/23 0454  ondansetron (ZOFRAN) tablet 4 mg  Every 6 Hours PRN,   Status:  Discontinued        See Hyperspace for full Linked Orders Report.    08/12/23 0502    08/12/23 0454  ondansetron (ZOFRAN) injection 4 mg  Every 6 Hours PRN,   Status:  Discontinued        See Hyperspace for full Linked Orders Report.    08/12/23 0502 08/12/23 0454  terbutaline (BRETHINE) injection 0.25 mg  As Needed,   Status:  Discontinued         08/12/23 0502 08/12/23 0454  Sod Citrate-Citric Acid (BICITRA) solution 30 mL  Once As Needed,   Status:  Discontinued         08/12/23 0502 08/12/23 0239  Recheck sve in 2 hours and update md  Misc Nursing Order (Specify)  Once,   Status:  Canceled        Comments: Recheck sve in 2 hours and update md    08/12/23 0239    Unscheduled  Apply Ice to Perineum  As Needed       08/12/23 1530    Unscheduled  Bladder Assessment  As Needed       08/12/23 1530    Unscheduled  Blood Gas, Arterial -  As Needed       08/12/23 1147    Unscheduled  Up with Assistance  As Needed       08/12/23 1530    Unscheduled  Bladder Scan if Patient Unable to Void 4-6 Hours After Catheter Removal  As Needed         08/12/23 1530    Unscheduled  Straight Cath Every 4-6 Hours As Needed If Patient is Unable to Void After 4-6 Hours, Bladder Scan Volume is Greater Than 500mL & Patient Has Symptoms of Bladder Discomfort / Distention  As Needed       08/12/23 1530    Unscheduled  Schedule / Prompt Voiding For Patients With Urinary Incontinence  As Needed       08/12/23 1530    Unscheduled  Chewing Gum  As Needed      Comments: Chew gum for 30 minutes three times a day after surgery.    08/12/23 1530    Unscheduled  Apply witch hazel pads / TUCKS to perineum as needed for comfort PRN  As Needed       08/12/23 1530    Unscheduled  Warm compress  As Needed        23 1530    Unscheduled  Apply ace wrap, tight bra, or binder  As Needed       23 1530    Unscheduled  Apply ice packs  As Needed       23 1530                     Operative/Procedure Notes (all)        Aakash Sinha MD at 23 1033  Version 1 of [address] [place] [address]  Luh Tapia  : 1985  MRN: [id number]  CSN: [id number]  Date: 2023    Operative Note        Pre-op Diagnosis:  Previous  delivery, antepartum [O34.219]  Undesired fertility   Post-op Diagnosis:  Post-Op Diagnosis Codes:     * Previous  delivery, antepartum [O34.219]  Undesired fertility   Procedure: Procedure(s):   SECTION REPEAT WITH bilateral tubal ligation   Surgeon: Surgeon(s):  Aakash Sinha MD       Anesthesia: Spinal     Estimated Blood Loss: 700   mLs   Fluids: 500   mLs   UOP: 75   mLs   Drains: Silverman catheter   ABx: Kefzol, azithromycin     Specimens:  Bilateral fallopian tube segments   Findings: Normal uterus, tubes, and ovaries.    Complications: None       INDICATION: Luh Tapia is a 37 y.o. female who presented at 40 weeks gestational age with painful contractions.  Originally she was considering a trial of labor, but decided that she would rather proceed with repeat .  She has previously expressed desire for surgical sterilization.     PROCEDURE: After informed consent was obtained, the patient was taken to the operating room where spinal anesthesia was administered. Time out procedure was completed and perioperative antibiotics were administered. She was placed in the supine position with leftward tilt and her abdomen was prepped and draped in normal sterile fashion after a Silverman catheter was placed by nursing staff.   After confirming adequate anesthesia, a Pfannenstiel incision was made with a scalpel 2 fingerbreadths above the pubic symphysis.  This was carried down sharply to the underlying fascia which was incised in the midline.   The fascial incision was extended laterally with Gutierrez scissors.  The fascial edges were then elevated and the underlying rectus muscles dissected off with sharp technique.  The rectus muscles were sharply  in the midline and the underlying peritoneum identified and entered sharply.  The peritoneal incision was then extended and an Phoenix-O retractor inserted, taking care to avoid entrapping the bowel.  A low transverse uterine incision was made with a clean scalpel.  The uterine incision was bluntly extended and amniotomy was performed returning clear fluid.  The head of the infant was delivered through the incision without difficulty and the remainder of the infant delivered with fundal pressure.  The infant was vigorous on the field, the cord was clamped and cut, and the infant handed off to waiting nursery nurse.  The placenta was then expressed.  The uterus was repaired in situ and cleared of clot and debris with a moist laparotomy sponge.  The uterine incision was closed with a running locked suture of 0 Monocryl.  A second imbricating layer of 0 Monocryl was placed for reinforcement.  The uterine incision was hemostatic.    Attention was turned to the tubal ligation portion of the procedure.  Each fallopian tube was identified and followed out to its fimbriated end to ensure correct identification.  A portion in the isthmic region of the left tube was grasped with a [place] clamp and elevated.  A window was then made in the mesosalpinx with Bovie cautery.  The proximal and distal ends of the tubal segment were then ligated with 0 plain gut suture.  The left tubal segment was sharply excised.  The excision site was made hemostatic with Bovie cautery if needed. A portion in the isthmic region of the right tube was grasped with a [place] clamp and elevated.  A window was then made in the mesosalpinx with Bovie cautery.  The proximal and distal ends of the tubal segment were then ligated with 0 plain gut  suture.  The right tubal segment was sharply excised.  The excision site was made hemostatic with Bovie cautery if needed.   The uterine incision was reinspected with hemostasis noted.  The tubal sites were reinspected and noted to be intact and hemostatic.  The retractor was removed.  The parietal peritoneum was then closed with a running suture of 2-0 Vicryl Rapide.  The subfascial spaces and rectus muscles were inspected with hemostasis noted.  The fascia was then closed with a running suture of 0 Vicryl.  The subcutaneous tissues were irrigated and made hemostatic with Bovie cautery.  The subcutaneous tissues were reapproximated with interrupted sutures of 2-0 plain gut.  The skin was closed with a subcuticular stitch of 3-0 Vicryl Rapide and reinforced with Steri-Strips.  A sterile dressing was then applied.    After expressing the uterus the patient was transitioned to the stretcher and taken to the recovery room in stable condition.  She tolerated the procedure well without complications.  All sponge, needle, and instrument counts were correct x3 per the OR staff.    Aakash Sinha MD   2023  11:39 CDT      Electronically signed by Aakash Sinha MD at 23 1142          Physician Progress Notes (last 7 days)        Aakash Sinha MD at 23 1047              Aakash Sinha MD  [facility]  [address] [facility] [address] [place] [address]  Office [phone]  Fax [phone]      [facility]   PROGRESS NOTE    Post-Op Day 1 S/P   Subjective     Patient reports:  Pain is well controlled with ERAS protocol.  She is ambulating. Tolerating diet. Voiding - without difficulty; flatus reported..  Vaginal bleeding is as much as expected.      Objective      Vitals: Vital Signs Range for the last 24 hours  Temperature: Temp:  [97.8 øF (36.6 øC)-98.4 øF (36.9 øC)] 98.3 øF (36.8 øC)   Temp Source: Temp src: Oral   BP: BP: (101-121)/(48-70) 107/64   Pulse: Heart Rate:   [64-91] 78   Respirations: Resp:  [16-18] 17   SPO2: SpO2:  [94 %-100 %] 99 %   O2 Amount (l/min):     O2 Devices Device (Oxygen Therapy): room air   Weight: Weight:  [76.2 kg (168 lb)] 76.2 kg (168 lb)            Physical Exam    Lungs clear to auscultation bilaterally   Abdomen Soft, approp tender   Incision  Dressing clean and dry   Extremities edema trace and Homans sign is negative, no sign of DVT     I reviewed the patient's new clinical results.  Lab Results (last 24 hours)       Procedure Component Value Units Date/Time    CBC & Differential [[id number]]  (Abnormal) Collected: 08/13/23 0551    Specimen: Blood Updated: 08/13/23 0658    Narrative:      The following orders were created for panel order CBC & Differential.  Procedure                               Abnormality         Status                     ---------                               -----------         ------                     CBC Auto Differential[[id number]]        Abnormal            Final result                 Please view results for these tests on the individual orders.    CBC Auto Differential [[id number]]  (Abnormal) Collected: 08/13/23 0551    Specimen: Blood Updated: 08/13/23 0658     WBC 17.55 10*3/mm3      RBC 3.21 10*6/mm3      Hemoglobin 9.4 g/dL      Hematocrit 29.2 %      MCV 91.0 fL      MCH 29.3 pg      MCHC 32.2 g/dL      RDW 14.2 %      RDW-SD 47.6 fl      MPV 11.3 fL      Platelets 216 10*3/mm3      Neutrophil % 82.1 %      Lymphocyte % 9.9 %      Monocyte % 7.0 %      Eosinophil % 0.1 %      Basophil % 0.2 %      Immature Grans % 0.7 %      Neutrophils, Absolute 14.42 10*3/mm3      Lymphocytes, Absolute 1.73 10*3/mm3      Monocytes, Absolute 1.22 10*3/mm3      Eosinophils, Absolute 0.01 10*3/mm3      Basophils, Absolute 0.04 10*3/mm3      Immature Grans, Absolute 0.13 10*3/mm3      nRBC 0.0 /100 WBC             External Prenatal Results       Pregnancy Outside Results - Transcribed From Office Records - See Scanned Records  For Details       Test Value Date Time    ABO  A  23 0520    Rh  Positive  23 0520    Antibody Screen  Negative  23 0520      ^ NEG  23 1114    Varicella IgG       Rubella ^ Reactive  23 1114    Hgb  9.4 g/dL 23 0551       12.7 g/dL 23 0520       11.6 g/dL 23 1329      ^ 12.4 g/dL 23 1515      ^ 13.3 g/dL 23 1907      ^ 13.4 g/dL 23 1114    Hct  29.2 % 23 0551       39.5 % 23 0520      ^ 35.9 % 23 1515      ^ 39.6 % 23 1907      ^ 38.7 % 23 1114    Glucose Fasting GTT  71 mg/dL 23 0723    Glucose Tolerance Test 1 hour  119 mg/dL 23 0723    Glucose Tolerance Test 3 hour  83 mg/dL 23 0723    Gonorrhea (discrete)  Negative  23 1304    Chlamydia (discrete)  Negative  23 1304    RPR ^ Non-reactive  23 1114    VDRL       Syphilis Antibody       HBsAg ^ Non-Reactive  23 1114    Herpes Simplex Virus PCR       Herpes Simplex VIrus Culture       HIV       Hep C RNA Quant PCR       Hep C Antibody ^ Non-Reactive  23 1114    AFP       Group B Strep  Positive  23 1304    GBS Susceptibility to Clindamycin       GBS Susceptibility to Erythromycin       Fetal Fibronectin       Genetic Testing, Maternal Blood                 Drug Screening       Test Value Date Time    Urine Drug Screen       Amphetamine Screen ^ Negative  20 1700    Barbiturate Screen ^ Negative  20 1700    Benzodiazepine Screen ^ Negative  20 1700    Methadone Screen       Phencyclidine Screen       Opiates Screen ^ Negative  20 1700    THC Screen       Cocaine Screen       Propoxyphene Screen       Buprenorphine Screen       Methamphetamine Screen       Oxycodone Screen       Tricyclic Antidepressants Screen                 Legend    ^: Historical                            Assessment & Plan        Previous  delivery, antepartum      Assessment:    Luh Tapia is Day 1   post-partum    , Low Transverse     .       Plan:  remove dressing and continue post op care.        Aakash Sinha MD  2023  10:48 CDT          Electronically signed by Aakash Sinha MD at 23 1042

## 2023-08-14 NOTE — OUTREACH NOTE
[facility]  IP Postpartum    Questions/Answers      Flowsheet Row Responses   Best Method for Contacting Cell   Support Person Present Yes   Does the patient have a car seat at the hospital Yes   Delivery Note Reviewed Reviewed   Were birth expectations met? Yes   Is there a need for additional support/resources? No   Lactation Note Reviewed Reviewed   Is additional support needed? No   Any questions or concerns? No   Is the patient going to use Meds to Beds? Yes   Any concerns related discharge meds/ability to  prescriptions? No   OB Discharge Navigator Reviewed  Reviewed   Confirm Postpartum OB appointment Yes   Postpartum OB appointment date 23   Confirm initial well-child Pediatrician appointment date/time: Yes   Initial Well Child Pediatrician Appointment Date 23   Additional post-discharge F/U appointments Yes   Post-Discharge F/U appointments details 8/15/23 @ 1030 Lactation with Ruth Milian   Does patient have transportation to appointments? Yes   Any other assistance needed to ensure she is able to attend appointments? No   Does patient have supplies needed at home for  care? Breast Pump, Clothing, Crib, Diapers, Formula          At client's bedside.  Baby in open crib and swaddled with hat on.  Mom states she has a headache and her primary nurse notified.  Client states she has support and all necessary items to take care of herself and baby.  Postpartum check-in scheduled for 2023 via phone call.  Reminder card given and reminder given related to mom needing to call WIC and make an appointment to have baby added to feeding plan.     Keily Burt RN  Maternity Nurse Navigator    2023, 13:47 CDT

## 2023-08-14 NOTE — PROGRESS NOTES
Octavio Hanna MD  [facility]  [address]  [place] [address]  Office [phone]  Fax [phone]      [facility]   PROGRESS NOTE    Post-Op Day 2 S/P   Subjective     Patient reports:  Pain is well controlled with acetaminophen and ibuprofen (OTC).  She is ambulating. Tolerating diet. Voiding - without difficulty; flatus reported..  Vaginal bleeding is as much as expected.      Objective      Vitals: Vital Signs Range for the last 24 hours  Temperature: Temp:  [97.7 øF (36.5 øC)-98.8 øF (37.1 øC)] 97.7 øF (36.5 øC)   Temp Source: Temp src: Oral   BP: BP: ()/(60-72) 99/72   Pulse: Heart Rate:  [70-78] 74   Respirations: Resp:  [16-18] 16   SPO2: SpO2:  [98 %-100 %] 98 %   O2 Amount (l/min):     O2 Devices Device (Oxygen Therapy): room air   Weight:              Physical Exam    Lungs Non-labored, symmetrical chest rise   Abdomen Soft, no TTP, no distension   Incision  healing well, no drainage, no erythema, no hernia, no seroma, no swelling, well approximated   Extremities extremities normal, atraumatic, no cyanosis or edema     I reviewed the patient's new clinical results.  Lab Results (last 24 hours)       Procedure Component Value Units Date/Time    Tissue Pathology Exam [[id number]] Collected: 23 1011    Specimen: Tissue from Fallopian Tube, Right; Tissue from Fallopian Tube, Left Updated: 2328    Tissue Pathology Exam [[id number]] Collected: 23 1011    Specimen: Tissue from Fallopian Tube, Left; Tissue from Fallopian Tube, Right Updated: 23 09            Prenatal Labs  Lab Results   Component Value Date    HGB 9.4 (L) 2023    RUBELLAABIGG Reactive 2023    HEPBSAG Non-Reactive 2023    ABORH A POS 2023    ABSCRN Negative 2023    HEPCVIRUSABY Non-Reactive 2023     (H) 2023    GGTFASTING 71 2023    MMV5FTDF 119 2023    HWC1RTSB 78 2023    VXQ9EKUX 83 2023    STREPB  Positive (A) 2023    CHLAMNAA Negative 2023    NGONORRHON Negative 2023       Immunizations:   Immunization History   Administered Date(s) Administered    COVID-19 (MODERNA) 1st,2nd,3rd Dose Monovalent 2022    Tdap 06/15/2020, 2023     Lab Results (last 24 hours)       Procedure Component Value Units Date/Time    Tissue Pathology Exam [[id number]] Collected: 23 1011    Specimen: Tissue from Fallopian Tube, Right; Tissue from Fallopian Tube, Left Updated: 2328    Tissue Pathology Exam [[id number]] Collected: 23 1011    Specimen: Tissue from Fallopian Tube, Left; Tissue from Fallopian Tube, Right Updated: 2326            CBC          2023    13:29 2023    05:20 2023    05:51   CBC   WBC  11.60  17.55    RBC  4.42  3.21    Hemoglobin 11.6  12.7  9.4    Hematocrit  39.5  29.2    MCV  89.4  91.0    MCH  28.7  29.3    MCHC  32.2  32.2    RDW  14.1  14.2    Platelets  217  216          Assessment & Plan        Previous  delivery, antepartum        Assessment:    Luh Tapia is Day 2  post-partum    , Low Transverse     .       Plan:  plan for discharge today.        Octavio Hanna MD  2023  11:33 CDT

## 2023-08-15 ENCOUNTER — TELEPHONE (OUTPATIENT)
Dept: OBSTETRICS AND GYNECOLOGY | Facility: CLINIC | Age: 38
End: 2023-08-15
Payer: COMMERCIAL

## 2023-08-15 ENCOUNTER — HOSPITAL ENCOUNTER (EMERGENCY)
Facility: HOSPITAL | Age: 38
Discharge: HOME | End: 2023-08-15
Payer: COMMERCIAL

## 2023-08-15 VITALS
SYSTOLIC BLOOD PRESSURE: 118 MMHG | OXYGEN SATURATION: 99 % | HEART RATE: 75 BPM | BODY MASS INDEX: 29.81 KG/M2 | WEIGHT: 162 LBS | RESPIRATION RATE: 16 BRPM | DIASTOLIC BLOOD PRESSURE: 75 MMHG | TEMPERATURE: 97.9 F | HEIGHT: 62 IN

## 2023-08-15 DIAGNOSIS — K64.4 EXTERNAL HEMORRHOIDS: Primary | ICD-10-CM

## 2023-08-15 LAB
CYTO UR: NORMAL
CYTO UR: NORMAL
LAB AP CASE REPORT: NORMAL
LAB AP CASE REPORT: NORMAL
Lab: NORMAL
Lab: NORMAL
PATH REPORT.FINAL DX SPEC: NORMAL
PATH REPORT.FINAL DX SPEC: NORMAL
PATH REPORT.GROSS SPEC: NORMAL
PATH REPORT.GROSS SPEC: NORMAL

## 2023-08-15 PROCEDURE — 99283 EMERGENCY DEPT VISIT LOW MDM: CPT

## 2023-08-15 RX ORDER — HYDROCORTISONE ACETATE 25 MG/1
25 SUPPOSITORY RECTAL 2 TIMES DAILY
Qty: 6 SUPPOSITORY | Refills: 0 | Status: SHIPPED | OUTPATIENT
Start: 2023-08-15 | End: 2023-08-18

## 2023-08-15 RX ORDER — ACETAMINOPHEN 500 MG
1000 TABLET ORAL ONCE
Status: COMPLETED | OUTPATIENT
Start: 2023-08-15 | End: 2023-08-15

## 2023-08-15 RX ORDER — HYDROCORTISONE ACETATE 25 MG/1
25 SUPPOSITORY RECTAL ONCE
Status: COMPLETED | OUTPATIENT
Start: 2023-08-15 | End: 2023-08-15

## 2023-08-15 RX ADMIN — HYDROCORTISONE ACETATE 25 MG: 25 SUPPOSITORY RECTAL at 22:10

## 2023-08-15 RX ADMIN — ACETAMINOPHEN 1000 MG: 500 TABLET, FILM COATED ORAL at 22:15

## 2023-08-15 NOTE — TELEPHONE ENCOUNTER
Pt called with c/o constipation. Pt states she has tried Colace TID, fiber tablets, and dulcolax with no relief. Pt states last BM was 8/11/23. Pt states she has been able to pass gas. Offered to send Dr. Sinha a message regarding pt's issues but pt states she is going to be evaluated in ER due to increased discomfort/pain. Pt advised to call office with any further questions/problems

## 2023-08-15 NOTE — PAYOR COMM NOTE
"DC HOME 23  UR  [phone]    Luh Tapia (37 y.o. Female)       Date of Birth   1985    Social Security Number       Address   [address] [facility] [address]    Home Phone   [phone]    MRN   [id number]       Zoroastrianism   Other    Marital Status                               Admission Date   23    Admission Type   Elective    Admitting Provider   Aakash Sinha MD    Attending Provider       Department, Room/Bed   [facility] MOTHER BABY 2A, M235/1       Discharge Date   2023    Discharge Disposition   Home or Self Care    Discharge Destination                                 Attending Provider: (none)   Allergies: No Known Allergies    Isolation: None   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)   Code Status: Prior    Ht: 160 cm (63\")   Wt: 76.2 kg (168 lb)    Admission Cmt: None   Principal Problem: Previous  delivery, antepartum [O34.219]                   Active Insurance as of 2023       Primary Coverage       Payor Plan Insurance Group Employer/Plan Group    [employer] [place]ICA[place]        Payor Plan Address Payor Plan Phone Number Payor Plan Fax Number Effective Dates    [address] [address] [phone]  2023 - None Entered    [place] [address]         Subscriber Name Subscriber Birth Date Member ID       LUH TAPIA 1985 [id number]                     Emergency Contacts        (Rel.) Home Phone Work Phone Mobile Phone    En Tapia (Spouse) [phone] -- [phone]                 Operative/Procedure Notes (all)        Aakash Sinha MD at 23 1033  Version 1 of [address] [place] [address]  Luh Tapia  : 1985  MRN: [id number]  CSN: [id number]  Date: 2023    Operative Note        Pre-op Diagnosis:  Previous  delivery, antepartum [O34.219]  Undesired fertility   Post-op Diagnosis:  Post-Op Diagnosis Codes:     * Previous  delivery, antepartum " [O34.219]  Undesired fertility   Procedure: Procedure(s):   SECTION REPEAT WITH bilateral tubal ligation   Surgeon: Surgeon(s):  Akaash Sinha MD       Anesthesia: Spinal     Estimated Blood Loss: 700   mLs   Fluids: 500   mLs   UOP: 75   mLs   Drains: Silverman catheter   ABx: Kefzol, azithromycin     Specimens:  Bilateral fallopian tube segments   Findings: Normal uterus, tubes, and ovaries.    Complications: None       INDICATION: Luh Tapia is a 37 y.o. female who presented at 40 weeks gestational age with painful contractions.  Originally she was considering a trial of labor, but decided that she would rather proceed with repeat .  She has previously expressed desire for surgical sterilization.     PROCEDURE: After informed consent was obtained, the patient was taken to the operating room where spinal anesthesia was administered. Time out procedure was completed and perioperative antibiotics were administered. She was placed in the supine position with leftward tilt and her abdomen was prepped and draped in normal sterile fashion after a Silverman catheter was placed by nursing staff.   After confirming adequate anesthesia, a Pfannenstiel incision was made with a scalpel 2 fingerbreadths above the pubic symphysis.  This was carried down sharply to the underlying fascia which was incised in the midline.  The fascial incision was extended laterally with Gutierrez scissors.  The fascial edges were then elevated and the underlying rectus muscles dissected off with sharp technique.  The rectus muscles were sharply  in the midline and the underlying peritoneum identified and entered sharply.  The peritoneal incision was then extended and an Phoenix-O retractor inserted, taking care to avoid entrapping the bowel.  A low transverse uterine incision was made with a clean scalpel.  The uterine incision was bluntly extended and amniotomy was performed returning clear fluid.  The head of the infant  was delivered through the incision without difficulty and the remainder of the infant delivered with fundal pressure.  The infant was vigorous on the field, the cord was clamped and cut, and the infant handed off to waiting nursery nurse.  The placenta was then expressed.  The uterus was repaired in situ and cleared of clot and debris with a moist laparotomy sponge.  The uterine incision was closed with a running locked suture of 0 Monocryl.  A second imbricating layer of 0 Monocryl was placed for reinforcement.  The uterine incision was hemostatic.    Attention was turned to the tubal ligation portion of the procedure.  Each fallopian tube was identified and followed out to its fimbriated end to ensure correct identification.  A portion in the isthmic region of the left tube was grasped with a Ducor clamp and elevated.  A window was then made in the mesosalpinx with Bovie cautery.  The proximal and distal ends of the tubal segment were then ligated with 0 plain gut suture.  The left tubal segment was sharply excised.  The excision site was made hemostatic with Bovie cautery if needed. A portion in the isthmic region of the right tube was grasped with a Em clamp and elevated.  A window was then made in the mesosalpinx with Bovie cautery.  The proximal and distal ends of the tubal segment were then ligated with 0 plain gut suture.  The right tubal segment was sharply excised.  The excision site was made hemostatic with Bovie cautery if needed.   The uterine incision was reinspected with hemostasis noted.  The tubal sites were reinspected and noted to be intact and hemostatic.  The retractor was removed.  The parietal peritoneum was then closed with a running suture of 2-0 Vicryl Rapide.  The subfascial spaces and rectus muscles were inspected with hemostasis noted.  The fascia was then closed with a running suture of 0 Vicryl.  The subcutaneous tissues were irrigated and made hemostatic with Bovie cautery.   The subcutaneous tissues were reapproximated with interrupted sutures of 2-0 plain gut.  The skin was closed with a subcuticular stitch of 3-0 Vicryl Rapide and reinforced with Steri-Strips.  A sterile dressing was then applied.    After expressing the uterus the patient was transitioned to the stretcher and taken to the recovery room in stable condition.  She tolerated the procedure well without complications.  All sponge, needle, and instrument counts were correct x3 per the OR staff.    Aakash Sinha MD   2023  11:39 CDT      Electronically signed by Aakahs Sinha MD at 23 1142          Discharge Summary        Octavio Hanna MD at 23 1136          [facility]    Octavio Hanna MD  [address] [facility] [address] [place] [address]  [phone]      Discharge Summary      Luh Tapia  : 1985  MRN: [id number]  CSN: [id number]    Date of Admission: 2023   Date of Discharge:  2023   Delivering Physician:         Admission Diagnosis: Normal labor [O80, Z37.9]  H/o  delivery     Discharge Diagnosis: Pregnancy at 40w1d - delivered  S/p repeat c/s       Procedures: 2023  - , Low Transverse       Presenting Problem/History of Present Illness  Active Hospital Problems    Diagnosis  POA    **Previous  delivery, antepartum [O34.219]  Yes      Resolved Hospital Problems    Diagnosis Date Resolved POA    Normal labor [O80, Z37.9] 2023 Not Applicable        Hospital Course   Patient is a 37 y.o.  who at 40w1d had a  section. See the completed operative report for details regarding antepartum course and delivery. Her post-operative course was unremarkable.  On POD # 2 she felt like she ready for discharge.  She had no febrile morbidity. She had normal bowel and bladder function and was hemodynamically stable.  Her wound was healing well without obvious signs of infections.    Infant  male  fetus weighing  3490 g (7 lb 11.1 oz)   Apgars -  8 @ 1 minute /  9 @ 5 minutes.    Procedures Performed    Procedure(s):   SECTION REPEAT WITH TUBAL  -------------------       Consults:   Consults       No orders found from 2023 to 2023.            Pertinent Test Results:   CBC          2023    13:29 2023    05:20 2023    05:51   CBC   WBC  11.60  17.55    RBC  4.42  3.21    Hemoglobin 11.6  12.7  9.4    Hematocrit  39.5  29.2    MCV  89.4  91.0    MCH  28.7  29.3    MCHC  32.2  32.2    RDW  14.1  14.2    Platelets  217  216        Condition on Discharge:  Stable    Vital Signs  Temp:  [97.7 øF (36.5 øC)-98.8 øF (37.1 øC)] 97.7 øF (36.5 øC)  Heart Rate:  [70-78] 74  Resp:  [16-18] 16  BP: ()/(60-72) 99/72    Physical Exam:   No exam performed today,    Discharge Disposition  Home or Self Care    Discharge Medications     Discharge Medications        New Medications        Instructions Start Date   docusate sodium 100 MG capsule   100 mg, Oral, 2 Times Daily PRN      ibuprofen 600 MG tablet  Commonly known as: ADVIL,MOTRIN   600 mg, Oral, Every 6 Hours PRN      traMADol 50 MG tablet  Commonly known as: Ultram   50 mg, Oral, Every 6 Hours PRN             Continue These Medications        Instructions Start Date   Breast Pump misc   1 each, Does not apply, As Needed      EQ FIBER SUPPLEMENT PO   Oral      PRENATAL 1 PO   Oral      PROBIOTIC ADVANCED PO   1 tablet, Oral, Daily               Discharge Diet:  Home diet    Activity at Discharge: , pelvic rest, no lifting greater than baby's weight    Follow-up Appointments  No future appointments.    Follow-up for postpartum visit/ incision check in 2 weeks with Dr. Sinha.    Test Results Pending at Discharge  Pending Labs       Order Current Status    Tissue Pathology Exam In process    Tissue Pathology Exam In process             Octavio Hanna MD  23  11:36 CDT    Time: Discharge <30 min            Electronically signed by  Octavio Hanna MD at 08/14/23 1139

## 2023-08-16 NOTE — DISCHARGE INSTRUCTIONS
Today you are seen in the emergency department for hemorrhoid.  Steroid suppository has been prescribed at discharge.  Please continue taking your stool softeners.  General surgery information has been provided as needed.  Please follow with primary care provider and OB/GYN as soon as possible to reassess symptoms.  Please return the emergency department for any new or worsening symptoms.

## 2023-08-16 NOTE — ED PROVIDER NOTES
Subjective   History of Present Illness  Patient is a 37-year-old female who presents emergency department with complaints of hemorrhoid.  She states that she had a  on .  She states that her last bowel movement was at 4:30 PM today.  She is having pain now from her hemorrhoid.  She is not having any bleeding.  She is not having any other issues.  She is taking stool softeners post .      Review of Systems   Gastrointestinal:         Hemorrhoid   All other systems reviewed and are negative.    Past Medical History:   Diagnosis Date    Hemangioma of lip 2017    Right Upper Lip    Lesion of hard palate 2017    Neoplasm of uncertain behavior of lip 2017    PONV (postoperative nausea and vomiting)     EXTREME       No Known Allergies    Past Surgical History:   Procedure Laterality Date    BREAST SURGERY Bilateral     IMPLANTS      SECTION       SECTION WITH TUBAL Bilateral 2023    Procedure:  SECTION REPEAT WITH TUBAL;  Surgeon: Aakash Sinha MD;  Location: [facility] LABOR DELIVERY;  Service: Obstetrics/Gynecology;  Laterality: Bilateral;    HEAD/NECK LESION/CYST EXCISION Bilateral 2017    Procedure: Excision of neoplasm of uncertain behavior of the upper lip with linear closure (07356);  Surgeon: Chris Ham MD;  Location: [facility] OR;  Service:     LIP REPAIR      OVER LAST 26 YEARS HAS HAD 4 OTHER SURGERIES ON LIP       Family History   Problem Relation Age of Onset    No Known Problems Mother     No Known Problems Father     Breast cancer Neg Hx     Colon cancer Neg Hx     Ovarian cancer Neg Hx     Uterine cancer Neg Hx        Social History     Socioeconomic History    Marital status:    Tobacco Use    Smoking status: Never    Smokeless tobacco: Never   Vaping Use    Vaping Use: Never used   Substance and Sexual Activity    Alcohol use: No    Drug use: No    Sexual activity: Yes     Partners: Male     Birth  control/protection: None           Objective   Physical Exam  Vitals and nursing note reviewed.   Constitutional:       General: She is not in acute distress.     Appearance: Normal appearance. She is normal weight. She is not ill-appearing or toxic-appearing.   HENT:      Head: Normocephalic.   Cardiovascular:      Rate and Rhythm: Normal rate and regular rhythm.      Pulses: Normal pulses.      Heart sounds: Normal heart sounds.   Pulmonary:      Effort: Pulmonary effort is normal.      Breath sounds: Normal breath sounds.   Abdominal:      General: Abdomen is flat. Bowel sounds are normal.      Palpations: Abdomen is soft.      Comments:  scar clean dry intact.  There are Steri-Strips in place.   Genitourinary:     Comments: External hemorrhoids noted on rectal exam.  They are soft.  No bleeding seen.  No irritation seen.  Musculoskeletal:         General: Normal range of motion.      Cervical back: Normal range of motion and neck supple.   Skin:     General: Skin is warm and dry.   Neurological:      General: No focal deficit present.      Mental Status: She is alert and oriented to person, place, and time. Mental status is at baseline.   Psychiatric:         Mood and Affect: Mood normal.         Behavior: Behavior normal.         Thought Content: Thought content normal.         Judgment: Judgment normal.       Procedures           ED Course                                           Medical Decision Making  Patient is a 37-year-old female who presents emergency department with complaints of hemorrhoid.  She states that she had a  on .  She states that her last bowel movement was at 4:30 PM today.  She is having pain now from her hemorrhoid.  She is not having any bleeding.  She is not having any other issues.  She is taking stool softeners post .    Vital signs stable on arrival. Patient was non-ill appearing on arrival.    Differential diagnoses include external  hemorrhoid, anal fissure, other.    Rectal exam was done.  They were external hemorrhoids noted on exam.  No bleeding or irritation seen.  Hemorrhoids were soft.  Patient was given Anusol suppository and Tylenol in the emergency department for symptomatic relief. Advised patient to follow with primary care provider as soon as possible to reassess symptoms. I did provide general surgery information if symptoms do not improve. Advised to return to ER for any new or worsening symptoms. Anusol suppositories prescribed at discharge.  Encouraged her to continue taking stool softeners.  Patient verbalized understanding of discharge instructions and agreed with them.  Patient was discharged home in stable condition.    Problems Addressed:  External hemorrhoids: acute illness or injury    Risk  OTC drugs.  Prescription drug management.        Final diagnoses:   External hemorrhoids       ED Disposition  ED Disposition       ED Disposition   Discharge    Condition   Stable    Comment   --               THEO Ybarra MD  [address] [facility] [address]  [address]  [place] [address]  [phone]    Schedule an appointment as soon as possible for a visit in 1 day      [facility] EMERGENCY DEPARTMENT  [address] [place] [id number] [phone]  Go to   If symptoms worsen    Yuridia Urias MD  [address] [facility] [address]  [place] [address]  [phone]    Schedule an appointment as soon as possible for a visit   If symptoms worsen         Medication List        New Prescriptions      hydrocortisone 25 MG suppository  Commonly known as: ANUSOL-HC  Insert 1 suppository into the rectum 2 (Two) Times a Day for 3 days.               Where to Get Your Medications        These medications were sent to [facility]/pharmacy #[id number] - [place], [place] - [address] AT ACROSS FROM [address] - [phone]  - [phone]   [address] [facility] [address]      Phone: [phone]   hydrocortisone 25  MG suppository            Ivania Godoy, APRN  08/16/23 0044

## 2023-08-22 ENCOUNTER — PATIENT OUTREACH (OUTPATIENT)
Dept: LABOR AND DELIVERY | Facility: HOSPITAL | Age: 38
End: 2023-08-22
Payer: COMMERCIAL

## 2023-08-22 NOTE — OUTREACH NOTE
Motherhood Connection  Postpartum Check-In    Questions/Answers      Flowsheet Row Responses   Visit Setting Telephone   Best Method for Contacting Cell   OB Discharge Note Reviewed  Reviewed   OB Discharge Navigator Reviewed  Reviewed   OB Discharge Medications Reviewed  Reviewed    discharged home with mother? Yes   Current Pain Levels 0-10 0   At Rest Pain Levels 0-10 0   Pain level with activity 0-10 0   Acceptable Pain Level 0-10 3   Verbalized Emotional State Acceptance   Family/Support Network Family   Level of Involvement in Care Attentive, Interactive, Supportive   Do you feel comfortable in your relationship with your baby? Yes   Have members of your household adjusted to your baby? Yes   Is the baby's father supportive and/or involved with the baby? Yes   How does your partner feel about the baby? Happy, Involved   Do you feel safe at home, school and work? Yes   Do you have the resources to keep yourself and your baby healthy and safe? Yes   Lochia (per patient report) Rubra   Amount Scant   Number of pads per day 4   Lochia Odor None   Is patient breastfeeding? Yes, pumping   Breast Care Supportive Bra   Postpartum Depression Screening Education Education Provided   Doctor Appointments: Education Provided   Postpartum Care Education Education Provided   S & S to report Education Provided   Followup Appointments Made Yes   Well Child Visit Appointments Made Yes   Appointment Date 23   Provider/Agency [facility]   Well Child Checkup Provider Name Aldair   Well Child Check Up Date: 23   Umbilical Cord No reported signs or symptoms   Was the baby circumcised? No   Feeding Readiness Cues: Cooing, Crying, Eager, Energy for feeding, Finger Sucking   Infant Feeding Method Breast, Expressed Breast Milk, Formula   Breastfeeding Right   Time breastfeeding left: 15-20 minutes   Time breastfeeding right: 15-20 minutes   Frequency of feedings every 2-4 hours   Formula PO (mL) 1-2 oz   Formula/Expressed  Milk frequency of feedings: prn   Expressed milk PO (mL) 4oz   Expressed milk- frequency of feedings prn   Number of wet diapers x 24 hours 10   Last BM x 24 hours 4-5   Emesis (Unmeasured Occurence) scant   What safe sleep surface is available? Shelia Arroyo   Are there stuffed animals, toys, pillows, quilts, blankets, wedges, positioners, bumpers or other loose bedding in the infant's sleeping environment? No   Where does the baby usually sleep? Shelia Arroyo   Does the baby ever share a sleep surface with a sibling, adult or pet? No   Does the baby ever share a sleep surface in a bed, couch, recliner or other? No   What position do you place your baby to sleep for naps? Back   What position do you place your baby to sleep at night Back            Review of Systems    Most Recent [place]  Depression Scale Score (EPDS)    Performed by a clinician: 0 (2023 12:57 PM)    Client aware that the RN call center will be calling in 1-2 weeks.     Keily Burt RN  Maternity Nurse Navigator    2023, 13:01 CDT

## 2023-08-23 ENCOUNTER — TELEPHONE (OUTPATIENT)
Dept: OBSTETRICS AND GYNECOLOGY | Facility: CLINIC | Age: 38
End: 2023-08-23
Payer: COMMERCIAL

## 2023-08-23 NOTE — TELEPHONE ENCOUNTER
Spoke with patient today in regards to an old physical therapy referral. Patient states she is still interested but knows [facility] no longer has a pelvic floor therapist. She comes in to see Dr. Sniha on 08/29/23 & wants to discuss a new referral to [facility] in [place]. Patient is aware once this referral has been placed I will fax over this referral & their office will call her for scheduling.

## 2023-08-29 ENCOUNTER — POSTPARTUM VISIT (OUTPATIENT)
Dept: OBSTETRICS AND GYNECOLOGY | Facility: CLINIC | Age: 38
End: 2023-08-29
Payer: COMMERCIAL

## 2023-08-29 ENCOUNTER — PATIENT OUTREACH (OUTPATIENT)
Dept: CALL CENTER | Facility: HOSPITAL | Age: 38
End: 2023-08-29
Payer: COMMERCIAL

## 2023-08-29 VITALS
WEIGHT: 152 LBS | HEIGHT: 62 IN | BODY MASS INDEX: 27.97 KG/M2 | DIASTOLIC BLOOD PRESSURE: 78 MMHG | SYSTOLIC BLOOD PRESSURE: 128 MMHG

## 2023-08-29 DIAGNOSIS — Z09 POSTOP CHECK: Primary | ICD-10-CM

## 2023-08-29 PROCEDURE — 99024 POSTOP FOLLOW-UP VISIT: CPT | Performed by: OBSTETRICS & GYNECOLOGY

## 2023-08-29 NOTE — PROGRESS NOTES
"Luh Tapia is a 37 y.o. female here today for incision check after undergoing  on .  She has been doing well since her discharge from the hospital and denies fevers, significant abdominal pain, nausea, or problems with her incision.  Her infant is breast-feeding well and she denies postpartum blues.    /78 (BP Location: Left arm, Patient Position: Sitting, Cuff Size: Adult)   Ht 157.5 cm (62\")   Wt 68.9 kg (152 lb)   LMP 10/24/2022 (Approximate)   Breastfeeding Yes   BMI 27.80 kg/mý   In general pleasant female no acute distress  Abdomen soft and nontender  Her surgical taping is removed and her incision is healing well without signs of infection    Assessment: Normal incision check after a     She will continue with light activities and pelvic rest.  She will followup in 4 weeks for a postpartum visit and call in the meantime if she has any questions or concerns.   "

## 2023-08-29 NOTE — OUTREACH NOTE
Motherhood Connection Survey      Flowsheet Row Responses   [facility] facility patient discharged from? [facility]   Week 1 attempt successful? No   Unsuccessful attempts Attempt 1   Reschedule Tomorrow              DOMINIQUE COOK - Registered Nurse

## 2023-08-30 ENCOUNTER — PATIENT OUTREACH (OUTPATIENT)
Dept: CALL CENTER | Facility: HOSPITAL | Age: 38
End: 2023-08-30
Payer: COMMERCIAL

## 2023-08-30 NOTE — OUTREACH NOTE
Motherhood Connection Survey      Flowsheet Row Responses   [facility] patient discharged from? [facility]   Week 1 attempt successful? Yes   Call start time 1613   Call end time 161   Baby sex Boy    discharged home with mother? Yes   Baby sex Boy   Delivery type    Emotional state Acceptance   Family support No   Do you have all necessary resources to care for you and your baby?  Yes   Have members of your household adjusted to your baby? Yes   Did you have any problems with pre-eclampsia during this pregnancy? No   Did you have blood glucose issues during this pregnancy No   Lochia amount None   Did you have an episiotomy/tear/abdominal incision? Yes   Feeding Method Combination   Frequency 3hours   Duration 10-15   Supplementing Formula   Breast Condition No   Nipple Condition No   Nursing Interventions Supportive bra   Signs baby is ready to eat Rooting, Crying, Finger sucking   Number of wet diapers x 24 hours 10   Last BM x 24 hours 4   Umbilical Cord No reported signs or symptoms   Was the baby circumcised? No   Where does the baby usually sleep? Bassinet, Crib   Are there stuffed animals, toys, pillows, quilts, blankets, wedges, positioners, bumpers or other loose bedding in the infant's sleeping environment? No   Does the baby ever share a sleep surface in a bed, couch, recliner or other? No   What position do you lay your baby down to sleep? Back   Are you and/or other caregivers smoking inside or outside the baby's home? Yes   Mom appointment comments: already seen   Baby appointment comments: already seen   Call completed? Yes   How satisfied were you with the Motherhood Connection Program? 5              DOMINIQUE COOK - Registered Nurse

## 2023-10-05 ENCOUNTER — POSTPARTUM VISIT (OUTPATIENT)
Dept: OBSTETRICS AND GYNECOLOGY | Facility: CLINIC | Age: 38
End: 2023-10-05
Payer: COMMERCIAL

## 2023-10-05 VITALS
HEIGHT: 62 IN | DIASTOLIC BLOOD PRESSURE: 72 MMHG | BODY MASS INDEX: 27.23 KG/M2 | WEIGHT: 148 LBS | SYSTOLIC BLOOD PRESSURE: 108 MMHG

## 2023-10-05 PROBLEM — O34.219 PREVIOUS CESAREAN DELIVERY, ANTEPARTUM: Status: RESOLVED | Noted: 2023-03-15 | Resolved: 2023-10-05

## 2023-10-05 PROBLEM — O09.529 ANTEPARTUM MULTIGRAVIDA OF ADVANCED MATERNAL AGE: Status: RESOLVED | Noted: 2023-03-15 | Resolved: 2023-10-05

## 2023-10-05 NOTE — PROGRESS NOTES
"Luh Tapia is here for a postpartum visit after a  6 weeks ago.  The depression questionnaire has been completed.  She has no signs of postpartum depression today.  She has not had a period since her delivery and is breast-feeding her infant without difficulty.  Her last Pap smear was 2022 and normal, HPV negative.  She has no history of cervical dysplasia.  She had tubal ligation for contraception.    /72 (BP Location: Left arm, Patient Position: Sitting)   Ht 157.5 cm (62\")   Wt 67.1 kg (148 lb)   Breastfeeding Yes   BMI 27.07 kg/m²    In general pleasant female no acute distress  Neck no thyromegaly  Abdomen soft and nontender, the incision is well healed  A Pap smear was not performed.     Assessment: Normal postpartum exam.    We have discussed current Pap smear screening guidelines.  Luh will return in 1 year or sooner if needed.  "

## 2024-02-12 ENCOUNTER — OFFICE VISIT (OUTPATIENT)
Dept: OBSTETRICS AND GYNECOLOGY | Age: 39
End: 2024-02-12
Payer: COMMERCIAL

## 2024-02-12 VITALS
SYSTOLIC BLOOD PRESSURE: 112 MMHG | HEIGHT: 62 IN | BODY MASS INDEX: 25.76 KG/M2 | DIASTOLIC BLOOD PRESSURE: 82 MMHG | WEIGHT: 140 LBS

## 2024-02-12 DIAGNOSIS — N89.8 VAGINAL DISCHARGE: Primary | ICD-10-CM

## 2024-02-12 PROCEDURE — 99213 OFFICE O/P EST LOW 20 MIN: CPT | Performed by: OBSTETRICS & GYNECOLOGY

## 2024-02-12 RX ORDER — FLUCONAZOLE 150 MG/1
150 TABLET ORAL ONCE
Qty: 1 TABLET | Refills: 0 | Status: SHIPPED | OUTPATIENT
Start: 2024-02-12 | End: 2024-02-12

## 2024-04-16 ENCOUNTER — TELEPHONE (OUTPATIENT)
Dept: OBSTETRICS AND GYNECOLOGY | Age: 39
End: 2024-04-16

## 2024-04-16 NOTE — TELEPHONE ENCOUNTER
Caller: Luh Tapia    Relationship: Self    Best call back number: [phone]    Requested Prescriptions:     ABX - FIRST MEDICATION NOT HELPING       Pharmacy where request should be sent: [facility] [place] [facility] [address] - [phone]  - [phone]     Last office visit with prescribing clinician: 2/12/2024   Last telemedicine visit with prescribing clinician: Visit date not found   Next office visit with prescribing clinician: 5/3/2024     Additional details provided by patient:     PT STATED THE FIRST MEDICATION IS NOT HELP  - SOONEST APPT AVAILABLE WAS 5/3/2024    PT NEEDS DIFFERENT RX DUE TO HAVING - MOD-SEVERE PAIN        Does the patient have less than a 3 day supply:  [x] Yes  [] No    Would you like a call back once the refill request has been completed: [x] Yes [] No    If the office needs to give you a call back, can they leave a voicemail: [x] Yes [] No    Grover Vickers Rep   04/16/24 11:21 CDT

## 2024-04-24 ENCOUNTER — APPOINTMENT (OUTPATIENT)
Dept: GENERAL RADIOLOGY | Facility: HOSPITAL | Age: 39
End: 2024-04-24
Payer: COMMERCIAL

## 2024-04-24 PROCEDURE — 73030 X-RAY EXAM OF SHOULDER: CPT

## 2024-04-24 PROCEDURE — 73060 X-RAY EXAM OF HUMERUS: CPT

## 2024-05-20 ENCOUNTER — OFFICE VISIT (OUTPATIENT)
Age: 39
End: 2024-05-20
Payer: COMMERCIAL

## 2024-05-20 VITALS
WEIGHT: 133 LBS | HEIGHT: 62 IN | BODY MASS INDEX: 24.48 KG/M2 | DIASTOLIC BLOOD PRESSURE: 82 MMHG | SYSTOLIC BLOOD PRESSURE: 124 MMHG

## 2024-05-20 DIAGNOSIS — N89.8 VAGINAL DISCHARGE: Primary | ICD-10-CM

## 2024-05-20 PROCEDURE — 99213 OFFICE O/P EST LOW 20 MIN: CPT | Performed by: OBSTETRICS & GYNECOLOGY

## 2024-05-20 NOTE — PROGRESS NOTES
"Luh Tapia is a 38 y.o. female here today for evaluation of recurrent vaginal discharge.  She was treated for vaginal candidiasis at her last visit in February.  She reports that she has recurrent symptoms of vaginal discharge and pain each menstrual cycle.  This occurs midcycle shortly after her menstrual flow stops.  She denies vaginal discharge or discomfort today.    Visit Vitals  /82 (BP Location: Left arm, Patient Position: Sitting)   Ht 157.5 cm (62\")   Wt 60.3 kg (133 lb)   LMP 04/26/2024 (Exact Date)   Breastfeeding No   BMI 24.33 kg/m²     Pleasant female no acute distress  Mood and affect normal  Breathing unlabored  Normal external genitalia.  Vaginal mucosa and cervix appear normal without lesions or discharge today.    Assessment: Recurrent vaginal discharge    A sample was collected for vaginitis panel today.  We will notify her when the results are available.  Treat based on laboratory findings, and she may need prophylaxis given her recurrent symptoms.  Call with questions or concerns.      "

## 2024-05-23 LAB
M GENITALIUM DNA SPEC QL NAA+PROBE: NEGATIVE
M HOMINIS DNA SPEC QL NAA+PROBE: NEGATIVE
UREAPLASMA DNA SPEC QL NAA+PROBE: NEGATIVE

## 2024-06-17 LAB
A VAGINAE DNA VAG QL NAA+PROBE: NORMAL SCORE
BVAB2 DNA VAG QL NAA+PROBE: NORMAL
C ALBICANS DNA VAG QL NAA+PROBE: NORMAL
C GLABRATA DNA VAG QL NAA+PROBE: NORMAL
M GENITALIUM DNA SPEC QL NAA+PROBE: NEGATIVE
M HOMINIS DNA SPEC QL NAA+PROBE: NEGATIVE
MEGA1 DNA VAG QL NAA+PROBE: NORMAL
SPECIMEN STATUS: NORMAL
T VAGINALIS DNA VAG QL NAA+PROBE: NORMAL
UREAPLASMA DNA SPEC QL NAA+PROBE: NEGATIVE

## 2024-10-08 ENCOUNTER — TELEPHONE (OUTPATIENT)
Dept: OBSTETRICS AND GYNECOLOGY | Age: 39
End: 2024-10-08

## 2024-10-08 NOTE — TELEPHONE ENCOUNTER
Caller: AIDA ANAND    Relationship:  SELF    Best call back number: [phone]    PATIENT CALLED REQUESTING TO CANCEL SAME DAY APPT.    Did the patient call AFTER the start time of their scheduled appointment?   NO    Was the patient's appointment rescheduled?  YES

## 2024-12-09 ENCOUNTER — OFFICE VISIT (OUTPATIENT)
Dept: OBGYN CLINIC | Age: 39
End: 2024-12-09

## 2024-12-09 VITALS
HEART RATE: 66 BPM | SYSTOLIC BLOOD PRESSURE: 100 MMHG | DIASTOLIC BLOOD PRESSURE: 80 MMHG | WEIGHT: 128 LBS | BODY MASS INDEX: 23.55 KG/M2 | HEIGHT: 62 IN

## 2024-12-09 DIAGNOSIS — Z11.51 ENCOUNTER FOR SCREENING FOR HUMAN PAPILLOMAVIRUS (HPV): ICD-10-CM

## 2024-12-09 DIAGNOSIS — R10.2 PELVIC PAIN: ICD-10-CM

## 2024-12-09 DIAGNOSIS — N89.8 VAGINAL DISCHARGE: ICD-10-CM

## 2024-12-09 DIAGNOSIS — Z12.4 CERVICAL CANCER SCREENING: ICD-10-CM

## 2024-12-09 DIAGNOSIS — Z01.419 WELL WOMAN EXAM WITH ROUTINE GYNECOLOGICAL EXAM: Primary | ICD-10-CM

## 2024-12-09 LAB
ALBUMIN SERPL-MCNC: 4.6 G/DL (ref 3.5–5.2)
ALP SERPL-CCNC: 56 U/L (ref 35–104)
ALT SERPL-CCNC: 14 U/L (ref 5–33)
ANION GAP SERPL CALCULATED.3IONS-SCNC: 11 MMOL/L (ref 7–19)
AST SERPL-CCNC: 17 U/L (ref 5–32)
BILIRUB SERPL-MCNC: 0.2 MG/DL (ref 0.2–1.2)
BUN SERPL-MCNC: 25 MG/DL (ref 6–20)
CALCIUM SERPL-MCNC: 9.2 MG/DL (ref 8.6–10)
CHLORIDE SERPL-SCNC: 100 MMOL/L (ref 98–111)
CO2 SERPL-SCNC: 27 MMOL/L (ref 22–29)
CREAT SERPL-MCNC: 0.6 MG/DL (ref 0.5–0.9)
ERYTHROCYTE [DISTWIDTH] IN BLOOD BY AUTOMATED COUNT: 12.6 % (ref 11.5–14.5)
GLUCOSE SERPL-MCNC: 112 MG/DL (ref 70–99)
HBA1C MFR BLD: 5.3 % (ref 4–5.6)
HCT VFR BLD AUTO: 40.9 % (ref 37–47)
HGB BLD-MCNC: 13.7 G/DL (ref 12–16)
MCH RBC QN AUTO: 30.6 PG (ref 27–31)
MCHC RBC AUTO-ENTMCNC: 33.5 G/DL (ref 33–37)
MCV RBC AUTO: 91.5 FL (ref 81–99)
PLATELET # BLD AUTO: 280 K/UL (ref 130–400)
PMV BLD AUTO: 10.1 FL (ref 9.4–12.3)
POTASSIUM SERPL-SCNC: 3.8 MMOL/L (ref 3.5–5)
PROT SERPL-MCNC: 7.4 G/DL (ref 6.4–8.3)
RBC # BLD AUTO: 4.47 M/UL (ref 4.2–5.4)
SODIUM SERPL-SCNC: 138 MMOL/L (ref 136–145)
WBC # BLD AUTO: 9.9 K/UL (ref 4.8–10.8)

## 2024-12-09 PROCEDURE — 99395 PREV VISIT EST AGE 18-39: CPT | Performed by: OBSTETRICS & GYNECOLOGY

## 2024-12-11 LAB
HPV HR 12 DNA SPEC QL NAA+PROBE: NOT DETECTED
HPV16 DNA SPEC QL NAA+PROBE: NOT DETECTED
HPV16+18+H RISK 12 DNA SPEC-IMP: NORMAL
HPV18 DNA SPEC QL NAA+PROBE: NOT DETECTED

## 2024-12-12 DIAGNOSIS — R10.2 PELVIC PAIN: Primary | ICD-10-CM

## 2024-12-12 ASSESSMENT — ENCOUNTER SYMPTOMS
EYES NEGATIVE: 1
RESPIRATORY NEGATIVE: 1
GASTROINTESTINAL NEGATIVE: 1
ALLERGIC/IMMUNOLOGIC NEGATIVE: 1

## 2024-12-12 NOTE — PROGRESS NOTES
Pt presents today for pap smear and breast exam.  She also complains of pelvic pain x 4 years     Last mammogram:  n/a   Last pap smear:  - Sabianist   Contraception:  no   :  2  Para:  1  AB:  1  Last bone density:  n/a   Last colonoscopy: n/a     
person, place, and time.      Cranial Nerves: No cranial nerve deficit.   Psychiatric:         Mood and Affect: Mood normal.         Speech: Speech normal.         Behavior: Behavior normal.         Thought Content: Thought content normal.         Judgment: Judgment normal.         Physical Exam         Results      Lab Results   Component Value Date    WBC 9.9 12/09/2024    HGB 13.7 12/09/2024    HCT 40.9 12/09/2024    MCV 91.5 12/09/2024     12/09/2024     Lab Results   Component Value Date     12/09/2024    K 3.8 12/09/2024     12/09/2024    CO2 27 12/09/2024    BUN 25 (H) 12/09/2024    CREATININE 0.6 12/09/2024    GLUCOSE 112 (H) 12/09/2024    CALCIUM 9.2 12/09/2024    BILITOT 0.2 12/09/2024    ALKPHOS 56 12/09/2024    AST 17 12/09/2024    ALT 14 12/09/2024    LABGLOM >90 12/09/2024    GFRAA >59 05/11/2021       No results found for: \"TSH\", \"TSHFT4\", \"TSHELE\", \"CYV9MOM\", \"TSHHS\"            1. Well woman exam with routine gynecological exam  -     Human papillomavirus (HPV) DNA probe thin prep high risk  -     PAP SMEAR  2. Encounter for screening for human papillomavirus (HPV)  -     Human papillomavirus (HPV) DNA probe thin prep high risk  -     PAP SMEAR  3. Cervical cancer screening  -     Human papillomavirus (HPV) DNA probe thin prep high risk  -     PAP SMEAR  4. Pelvic pain  -     Miscellaneous sendout 2  -     CBC; Future  -     Comprehensive Metabolic Panel; Future  5. Vaginal discharge  -     Hemoglobin A1C; Future  -     Miscellaneous sendout 2  -     CBC; Future  -     Comprehensive Metabolic Panel; Future         Assessment & Plan  1. Well woman Exam  Pap collected.      2. Vaginal infection.  She reports experiencing pain, clumpy discharge, and occasional fever. A Pap smear and swab test will be performed today to identify the causative organism. The swab will check for common bacteria and different strains of yeast. An ultrasound is also recommended to evaluate the irregular

## 2024-12-13 ENCOUNTER — HOSPITAL ENCOUNTER (OUTPATIENT)
Dept: ULTRASOUND IMAGING | Age: 39
Discharge: HOME | End: 2024-12-13

## 2024-12-13 DIAGNOSIS — R10.2 PELVIC PAIN: ICD-10-CM

## 2024-12-13 PROCEDURE — 76830 TRANSVAGINAL US NON-OB: CPT

## 2024-12-30 ENCOUNTER — PATIENT MESSAGE (OUTPATIENT)
Dept: OBGYN CLINIC | Age: 39
End: 2024-12-30

## 2024-12-30 NOTE — TELEPHONE ENCOUNTER
Attempted to contact imaging, went to a voicemail.   I called the # given on voicemail, got sent to another voicemail.     I will try to contact them again within the hour.

## 2024-12-31 ENCOUNTER — TELEPHONE (OUTPATIENT)
Dept: OBGYN CLINIC | Age: 39
End: 2024-12-31

## 2024-12-31 NOTE — TELEPHONE ENCOUNTER
----- Message from Dr. Robyn Tirado MD sent at 12/31/2024 12:49 PM CST -----  Overall size of uterus is bulky.  Lining is significantly thickened.  Recommend endometrial biopsy.  Can do EMB in the office

## 2025-01-20 ENCOUNTER — PREP FOR PROCEDURE (OUTPATIENT)
Dept: OBGYN CLINIC | Age: 40
End: 2025-01-20

## 2025-01-20 DIAGNOSIS — Z53.8 FAILED ATTEMPTED SURGICAL PROCEDURE: ICD-10-CM

## 2025-01-21 RX ORDER — SODIUM CHLORIDE, SODIUM LACTATE, POTASSIUM CHLORIDE, CALCIUM CHLORIDE 600; 310; 30; 20 MG/100ML; MG/100ML; MG/100ML; MG/100ML
INJECTION, SOLUTION INTRAVENOUS CONTINUOUS
Status: CANCELLED | OUTPATIENT
Start: 2025-01-21

## 2025-01-21 RX ORDER — SODIUM CHLORIDE 9 MG/ML
INJECTION, SOLUTION INTRAVENOUS PRN
Status: CANCELLED | OUTPATIENT
Start: 2025-01-21

## 2025-01-21 RX ORDER — SODIUM CHLORIDE 0.9 % (FLUSH) 0.9 %
5-40 SYRINGE (ML) INJECTION PRN
Status: CANCELLED | OUTPATIENT
Start: 2025-01-21

## 2025-01-21 RX ORDER — SODIUM CHLORIDE 0.9 % (FLUSH) 0.9 %
5-40 SYRINGE (ML) INJECTION EVERY 12 HOURS SCHEDULED
Status: CANCELLED | OUTPATIENT
Start: 2025-01-21

## 2025-04-21 ENCOUNTER — OFFICE VISIT (OUTPATIENT)
Dept: OBSTETRICS AND GYNECOLOGY | Age: 40
End: 2025-04-21
Payer: MEDICAID

## 2025-04-21 VITALS
SYSTOLIC BLOOD PRESSURE: 102 MMHG | HEIGHT: 62 IN | DIASTOLIC BLOOD PRESSURE: 72 MMHG | WEIGHT: 126 LBS | BODY MASS INDEX: 23.19 KG/M2

## 2025-04-21 DIAGNOSIS — N89.8 VAGINAL DISCHARGE: Primary | ICD-10-CM

## 2025-04-21 NOTE — PROGRESS NOTES
"Chief Complaint   Patient presents with    Vaginitis     Patient is here today with reoccurring yeast infection prior to period.  Pt is having itching and burning. Pt is wants to discuss hormones. Pt complains of night sweats, mood swings andHot flashes.   Self pay.       History:  Luh Tapia is a 39 y.o. female who presents today for follow-up for evaluation of the above:    HPI  Patient presents today with c/o thick and clumpy discharge that has been occurring for a while that is intermittent. Burning and painful. Occurring prior to menstrual periods each month  Discharge was occurring last week.  Has tried monistat over the counter to help when she has the symptoms and it will improve.         ROS:  Review of Systems   Constitutional: Negative.    HENT: Negative.     Eyes: Negative.    Respiratory: Negative.     Cardiovascular: Negative.    Gastrointestinal: Negative.    Endocrine: Negative.    Genitourinary:  Positive for vaginal discharge and vaginal pain.   Musculoskeletal: Negative.    Skin: Negative.    Neurological: Negative.    Psychiatric/Behavioral: Negative.         Ms. Tapia  reports that she has never smoked. She has never used smokeless tobacco. She reports that she does not drink alcohol and does not use drugs.      Current Outpatient Medications:     Probiotic Product (PROBIOTIC ADVANCED PO), Take 1 tablet by mouth Daily., Disp: , Rfl:     Biotin 1000 MCG chewable tablet, Chew. (Patient not taking: Reported on 4/21/2025), Disp: , Rfl:     Prenatal MV-Min-Fe Fum-FA-DHA (PRENATAL 1 PO), Take  by mouth. (Patient not taking: Reported on 4/21/2025), Disp: , Rfl:       OBJECTIVE:  /72   Ht 157.5 cm (62\")   Wt 57.2 kg (126 lb)   LMP 03/25/2025 (Exact Date)   BMI 23.05 kg/m²    Physical Exam  Exam conducted with a chaperone present.   Constitutional:       Appearance: She is not ill-appearing.   Pulmonary:      Effort: No respiratory distress.   Genitourinary:     Vagina: Vaginal " discharge (thick white) present.   Neurological:      Mental Status: She is oriented to person, place, and time.   Psychiatric:         Behavior: Behavior normal.         Assessment/Plan    Diagnoses and all orders for this visit:    1. Vaginal discharge (Primary)  -     NuSwab BV & Candida - Swab, Vagina    Patient was following with Dr. Wells at [facility] and was recommended to have a D&C after a failed EMB in the office there.  This was being done due to a thick endometrial lining and bulky appearing uterus on ultrasound in January.  She did not complete the D&C.     An After Visit Summary was printed and given to the patient at discharge.  Return for f/u with sublette thickened endometrial lining. . Sooner if problems arise.          Mayelin Harman APRN. 4/21/2025   Electronically Signed

## 2025-04-24 LAB
LAB AP CASE REPORT: NORMAL
Lab: NORMAL
PATH INTERP SPEC-IMP: NORMAL

## 2025-05-19 ENCOUNTER — OFFICE VISIT (OUTPATIENT)
Age: 40
End: 2025-05-19
Payer: MEDICAID

## 2025-05-19 VITALS
DIASTOLIC BLOOD PRESSURE: 86 MMHG | HEIGHT: 62 IN | BODY MASS INDEX: 23.48 KG/M2 | WEIGHT: 127.6 LBS | SYSTOLIC BLOOD PRESSURE: 122 MMHG

## 2025-05-19 DIAGNOSIS — N94.10 FEMALE DYSPAREUNIA: Primary | ICD-10-CM

## 2025-05-19 DIAGNOSIS — N94.6 DYSMENORRHEA: ICD-10-CM

## 2025-05-19 PROCEDURE — 99213 OFFICE O/P EST LOW 20 MIN: CPT | Performed by: OBSTETRICS & GYNECOLOGY

## 2025-05-19 RX ORDER — MULTIPLE VITAMINS W/ MINERALS TAB 9MG-400MCG
1 TAB ORAL DAILY
COMMUNITY

## 2025-05-19 RX ORDER — NORETHINDRONE ACETATE AND ETHINYL ESTRADIOL .02; 1 MG/1; MG/1
1 TABLET ORAL DAILY
Qty: 42 TABLET | Refills: 8 | Status: SHIPPED | OUTPATIENT
Start: 2025-05-19 | End: 2026-05-19

## 2025-05-19 NOTE — PROGRESS NOTES
"Luh Tapia is a 39 y.o. female here today for evaluation of pelvic pain.  She has a history of recurrent yeast infections associated with her menstrual cycle.  This has been evaluated several times over the last year or 2, and upon review of records she has been complaining of pain during this time as well.  Most recently, she reports that she has not had any discharge with her last 2 menstrual cycles, but the pain is still present.  When the pain occurs it lasts about 1 week, and is worse with intercourse.  She describes focal deep tenderness with intercourse.  Menstrual bleeding is also fairly painful for her.  She reports that the pain resolved during her last pregnancy, but returned after delivery.  She has tried pelvic floor physical therapy without improvement in her symptoms.  She had tubal sterilization with her last .    Visit Vitals  /86 (BP Location: Right arm, Patient Position: Sitting, Cuff Size: Small Adult)   Ht 157.5 cm (62\")   Wt 57.9 kg (127 lb 9.6 oz)   LMP 2025   Breastfeeding No   BMI 23.34 kg/m²      Pleasant female no acute distress  Mood and affect normal  Breathing unlabored    A pelvic ultrasound was ordered and performed in the office today.  The uterus and ovaries appear normal.    Assessment: Dyspareunia, dysmenorrhea    Until now we have been more focused on the recurrent discharge, but the pain has not really been addressed.  Her symptoms have persisted despite the resolution of discharge, and are suspicious for endometriosis.  We discussed management options including a trial of hormonal suppression versus surgical evaluation with diagnostic laparoscopy.  She would like to proceed with medical management first, so I have given her a prescription for a continuous oral contraceptive pill.  We discussed common side effects including unscheduled bleeding.  She will return to the office in about 2 months to check on symptom control.  Call with questions or " concerns in the meantime.

## (undated) DEVICE — 3M™ MEDIPORE™ H SOFT CLOTH SURGICAL TAPE 2868, 8 INCH X 10 YARD (20,3CM X 9,1M), 6 ROLLS/CASE: Brand: 3M™ MEDIPORE™

## (undated) DEVICE — 3M™ STERI-STRIP™ REINFORCED ADHESIVE SKIN CLOSURES, R1547, 1/2 IN X 4 IN (12 MM X 100 MM), 6 STRIPS/ENVELOPE: Brand: 3M™ STERI-STRIP™

## (undated) DEVICE — SUTURE VCRL SZ 0 L36IN ABSRB VLT L36MM CT-1 1/2 CIR J346H

## (undated) DEVICE — SUTURE STRATAFIX SYMMETRIC PDS + SZ 1 L18IN ABSRB VLT L48MM SXPP1A400

## (undated) DEVICE — SUTURE MCRYL SZ 3-0 L27IN ABSRB UD L60MM KS STR REV CUT Y523H

## (undated) DEVICE — SUTURE VCRL + SZ 2-0 L36IN ABSRB UD L36MM CT-1 1/2 CIR VCP945H

## (undated) DEVICE — SUT VIC RAPD SZ 3/0 27IN PS1 VR935

## (undated) DEVICE — TBG PENCL TELESCP MEGADYNE SMOKE EVAC 10FT

## (undated) DEVICE — GARMENT COMPR STD FOR 17IN CALF UNIF THER FLOTRN

## (undated) DEVICE — Device

## (undated) DEVICE — TRAY EPI 25GA L3.5IN 0.75% BIPIVCAIN 8.25% D CONTAIN BPA

## (undated) DEVICE — LARGE, DISPOSABLE C-SECTION RETRACTOR: Brand: ALEXIS ® O C-SECTION PROTECTOR/RETRACTOR

## (undated) DEVICE — MATTRESS TRANSFER X LG 39 IN LAT MAXI AIR

## (undated) DEVICE — SOLUTION IV IRRIG POUR BRL 0.9% SODIUM CHL 2F7124

## (undated) DEVICE — PAD C-SECTION: Brand: MEDLINE INDUSTRIES, INC.

## (undated) DEVICE — GLV SURG BIOGEL LTX PF 8

## (undated) DEVICE — SUT GUT PLN 0 CT3 27IN N864H

## (undated) DEVICE — SUT MNCRYL 0/0 CTX 36IN Y398H

## (undated) DEVICE — CATHETERIZATION TRAY PEDIATRIC 16 FR 5 CC INDWL DOVER

## (undated) DEVICE — PATIENT RETURN ELECTRODE, SINGLE-USE, CONTACT QUALITY MONITORING, ADULT, WITH 15 FT (4.5 M) CORD. FOR PATIENTS WEIGHING OVER 33LBS. (15KG): Brand: MEGADYNE

## (undated) DEVICE — SPNG GZ WOVN 4X4IN 12PLY 10/BX STRL

## (undated) DEVICE — SUTURE CHROMIC GUT SZ 3-0 L27IN ABSRB BRN L26MM SH 1/2 CIR G122H

## (undated) DEVICE — PK ENT HD AND NK 30

## (undated) DEVICE — DRESSING FOAM W10XL20CM ANTIMIC SELF ADH SAFETAC TECHNOLOGY

## (undated) DEVICE — SUT VIC 0 CTX 36IN J978H

## (undated) DEVICE — SUT GUT PLAIN TPR PT 2/0 27IN 53T

## (undated) DEVICE — SUT SILK 2/0 SH 30IN K833H

## (undated) DEVICE — SYSTEM SKIN CLSR 22CM DERMBND PRINEO

## (undated) DEVICE — SPONGE LAP W18XL18IN WHT COT 4 PLY FLD STRUNG RADPQ DISP ST

## (undated) DEVICE — PK TURNOVER RM ADV

## (undated) DEVICE — SUT VIC RAPD SZ 2/0 36IN CT1 VR945 BX/12

## (undated) DEVICE — STERILE LATEX POWDER FREE SURGICAL GLOVES WITH HYDROGEL COATING: Brand: PROTEXIS

## (undated) DEVICE — ELECTRD NDL EDGE/INSUL/PFTE.787MM 2.84IN

## (undated) DEVICE — SUTURE VCRL SZ 0 L27IN ABSRB VLT L48MM CTX 1/2 CIR TAPR PNT J364H

## (undated) DEVICE — BINDER ABD H12IN COT FOR 45-62IN WAIST UNIV PREM 4 PNL DSGN

## (undated) DEVICE — ADHS LIQ MASTISOL 2/3ML

## (undated) DEVICE — MARKR SKIN W/RULR AND LBL

## (undated) DEVICE — SYRINGE EAR 2OZ ULC ST BLB FLAT BTM PVC BROAD TIP REUSE

## (undated) DEVICE — CVR HNDL LIGHT RIGID

## (undated) DEVICE — GLV SURG SENSICARE SLT PF LF 8 STRL

## (undated) DEVICE — MASK ROUND 60MM FPH (10) S/USE: Brand: FISHER & PAYKEL HEALTHCARE

## (undated) DEVICE — PREP SOL POVIDONE/IODINE BT 4OZ

## (undated) DEVICE — DISPOSABLE BIPOLAR CABLE 12FT. (3.6M): Brand: KIRWAN

## (undated) DEVICE — APPL CHLORAPREP HI/LITE 26ML ORNG

## (undated) DEVICE — MASK ROUND 50MM FPH (10) S/USE: Brand: FISHER & PAYKEL HEALTHCARE